# Patient Record
Sex: FEMALE | Race: WHITE | NOT HISPANIC OR LATINO | Employment: FULL TIME | URBAN - METROPOLITAN AREA
[De-identification: names, ages, dates, MRNs, and addresses within clinical notes are randomized per-mention and may not be internally consistent; named-entity substitution may affect disease eponyms.]

---

## 2023-04-04 LAB
ERYTHROCYTE DISTRIBUTION WIDTH (RATIO) BY AUTOMATED COUNT: 14 % (ref 11.5–14.5)
ERYTHROCYTE MEAN CORPUSCULAR HEMOGLOBIN CONCENTRATION (G/DL) BY AUTOMATED: 31.7 G/DL (ref 32–36)
ERYTHROCYTE MEAN CORPUSCULAR VOLUME (FL) BY AUTOMATED COUNT: 92 FL (ref 80–100)
ERYTHROCYTES (10*6/UL) IN BLOOD BY AUTOMATED COUNT: 4.76 X10E12/L (ref 4–5.2)
HEMATOCRIT (%) IN BLOOD BY AUTOMATED COUNT: 43.6 % (ref 36–46)
HEMOGLOBIN (G/DL) IN BLOOD: 13.8 G/DL (ref 12–16)
LEUKOCYTES (10*3/UL) IN BLOOD BY AUTOMATED COUNT: 8.8 X10E9/L (ref 4.4–11.3)
PLATELETS (10*3/UL) IN BLOOD AUTOMATED COUNT: 287 X10E9/L (ref 150–450)

## 2023-04-05 ENCOUNTER — OFFICE VISIT (OUTPATIENT)
Dept: PRIMARY CARE | Facility: CLINIC | Age: 48
End: 2023-04-05
Payer: COMMERCIAL

## 2023-04-05 VITALS
WEIGHT: 293 LBS | SYSTOLIC BLOOD PRESSURE: 117 MMHG | DIASTOLIC BLOOD PRESSURE: 72 MMHG | HEART RATE: 71 BPM | BODY MASS INDEX: 48.82 KG/M2 | OXYGEN SATURATION: 95 % | HEIGHT: 65 IN | TEMPERATURE: 96.2 F | RESPIRATION RATE: 17 BRPM

## 2023-04-05 DIAGNOSIS — G89.29 CHRONIC PAIN OF RIGHT KNEE: ICD-10-CM

## 2023-04-05 DIAGNOSIS — I10 BENIGN ESSENTIAL HYPERTENSION: ICD-10-CM

## 2023-04-05 DIAGNOSIS — R73.03 PREDIABETES: ICD-10-CM

## 2023-04-05 DIAGNOSIS — M25.561 CHRONIC PAIN OF RIGHT KNEE: ICD-10-CM

## 2023-04-05 DIAGNOSIS — E66.01 MORBID OBESITY (MULTI): ICD-10-CM

## 2023-04-05 DIAGNOSIS — F51.01 PRIMARY INSOMNIA: ICD-10-CM

## 2023-04-05 DIAGNOSIS — E55.9 VITAMIN D DEFICIENCY: ICD-10-CM

## 2023-04-05 DIAGNOSIS — M17.10 ARTHRITIS OF KNEE: Primary | ICD-10-CM

## 2023-04-05 DIAGNOSIS — K21.9 CHRONIC GERD: ICD-10-CM

## 2023-04-05 PROBLEM — M17.11 PRIMARY OSTEOARTHRITIS OF RIGHT KNEE: Status: ACTIVE | Noted: 2023-04-05

## 2023-04-05 LAB
ALANINE AMINOTRANSFERASE (SGPT) (U/L) IN SER/PLAS: 11 U/L (ref 7–45)
ALBUMIN (G/DL) IN SER/PLAS: 3.9 G/DL (ref 3.4–5)
ALKALINE PHOSPHATASE (U/L) IN SER/PLAS: 51 U/L (ref 33–110)
ANION GAP IN SER/PLAS: 12 MMOL/L (ref 10–20)
ASPARTATE AMINOTRANSFERASE (SGOT) (U/L) IN SER/PLAS: 20 U/L (ref 9–39)
BILIRUBIN TOTAL (MG/DL) IN SER/PLAS: 0.8 MG/DL (ref 0–1.2)
CALCIDIOL (25 OH VITAMIN D3) (NG/ML) IN SER/PLAS: 46 NG/ML
CALCIUM (MG/DL) IN SER/PLAS: 9.7 MG/DL (ref 8.6–10.6)
CARBON DIOXIDE, TOTAL (MMOL/L) IN SER/PLAS: 26 MMOL/L (ref 21–32)
CHLORIDE (MMOL/L) IN SER/PLAS: 106 MMOL/L (ref 98–107)
CREATININE (MG/DL) IN SER/PLAS: 0.85 MG/DL (ref 0.5–1.05)
ESTIMATED AVERAGE GLUCOSE FOR HBA1C: 117 MG/DL
GFR FEMALE: 84 ML/MIN/1.73M2
GLUCOSE (MG/DL) IN SER/PLAS: 74 MG/DL (ref 74–99)
HEMOGLOBIN A1C/HEMOGLOBIN TOTAL IN BLOOD: 5.7 %
POC AMPHETAMINE: NEGATIVE NG/ML
POC BARBITURATES: NEGATIVE NG/ML
POC BENZODIAZEPINES: NEGATIVE NG/ML
POC BUPRENORPHINE URINE: NEGATIVE NG/ML
POC COCAINE: NEGATIVE NG/ML
POC MDMA (NG/ML) IN URINE: NEGATIVE NG/ML
POC METHADONE MANUALLY ENTERED: NEGATIVE NG/ML
POC METHAMPHETAMINE: NEGATIVE NG/ML
POC MORPHINE URINE: NORMAL NG/ML
POC OPIATES: NEGATIVE NG/ML
POC OXYCODONE: NEGATIVE NG/ML
POC PHENCYCLIDINE (PCP): NEGATIVE NG/ML
POC THC: NEGATIVE NG/ML
POC TICYCLIC ANTIDEPRESSANTS (TCA): NORMAL NG/ML
POTASSIUM (MMOL/L) IN SER/PLAS: 4.4 MMOL/L (ref 3.5–5.3)
PROTEIN TOTAL: 6.5 G/DL (ref 6.4–8.2)
SODIUM (MMOL/L) IN SER/PLAS: 140 MMOL/L (ref 136–145)
UREA NITROGEN (MG/DL) IN SER/PLAS: 30 MG/DL (ref 6–23)

## 2023-04-05 PROCEDURE — 99214 OFFICE O/P EST MOD 30 MIN: CPT | Performed by: FAMILY MEDICINE

## 2023-04-05 PROCEDURE — 3074F SYST BP LT 130 MM HG: CPT | Performed by: FAMILY MEDICINE

## 2023-04-05 PROCEDURE — 80305 DRUG TEST PRSMV DIR OPT OBS: CPT | Performed by: FAMILY MEDICINE

## 2023-04-05 PROCEDURE — 3078F DIAST BP <80 MM HG: CPT | Performed by: FAMILY MEDICINE

## 2023-04-05 PROCEDURE — 80373 DRUG SCREENING TRAMADOL: CPT

## 2023-04-05 RX ORDER — PANTOPRAZOLE SODIUM 40 MG/1
40 TABLET, DELAYED RELEASE ORAL DAILY
Qty: 90 TABLET | Refills: 3 | Status: SHIPPED | OUTPATIENT
Start: 2023-04-05 | End: 2024-05-21 | Stop reason: SDUPTHER

## 2023-04-05 RX ORDER — OLMESARTAN MEDOXOMIL AND HYDROCHLOROTHIAZIDE 40/25 40; 25 MG/1; MG/1
1 TABLET ORAL DAILY
Qty: 90 TABLET | Refills: 3 | Status: SHIPPED | OUTPATIENT
Start: 2023-04-05 | End: 2023-07-07 | Stop reason: ALTCHOICE

## 2023-04-05 RX ORDER — NAPROXEN SODIUM 220 MG/1
TABLET, FILM COATED ORAL
COMMUNITY

## 2023-04-05 RX ORDER — ACETAMINOPHEN 500 MG
50 TABLET ORAL DAILY
COMMUNITY
Start: 2022-03-11

## 2023-04-05 RX ORDER — TOPIRAMATE 50 MG/1
50 TABLET, FILM COATED ORAL 2 TIMES DAILY
Qty: 180 TABLET | Refills: 1 | Status: SHIPPED | OUTPATIENT
Start: 2023-04-05 | End: 2023-10-20 | Stop reason: SDUPTHER

## 2023-04-05 RX ORDER — VERAPAMIL HYDROCHLORIDE 200 MG/1
200 CAPSULE, EXTENDED RELEASE ORAL NIGHTLY
Qty: 90 CAPSULE | Refills: 3 | Status: SHIPPED | OUTPATIENT
Start: 2023-04-05 | End: 2023-10-20 | Stop reason: DRUGHIGH

## 2023-04-05 RX ORDER — MELOXICAM 15 MG/1
1 TABLET ORAL DAILY PRN
COMMUNITY
Start: 2021-06-08 | End: 2023-04-05 | Stop reason: SDUPTHER

## 2023-04-05 RX ORDER — ORAL SEMAGLUTIDE 7 MG/1
1 TABLET ORAL DAILY
COMMUNITY
Start: 2023-03-14 | End: 2023-04-05 | Stop reason: DRUGHIGH

## 2023-04-05 RX ORDER — TRAMADOL HYDROCHLORIDE 50 MG/1
50 TABLET ORAL 3 TIMES DAILY
Qty: 270 TABLET | Refills: 0 | Status: SHIPPED | OUTPATIENT
Start: 2023-04-05 | End: 2023-07-07 | Stop reason: SDUPTHER

## 2023-04-05 RX ORDER — VERAPAMIL HYDROCHLORIDE 200 MG/1
200 CAPSULE, EXTENDED RELEASE ORAL NIGHTLY
COMMUNITY
Start: 2021-02-23 | End: 2023-04-05 | Stop reason: SDUPTHER

## 2023-04-05 RX ORDER — MELOXICAM 15 MG/1
15 TABLET ORAL DAILY PRN
Qty: 90 TABLET | Refills: 1 | Status: SHIPPED | OUTPATIENT
Start: 2023-04-05 | End: 2023-07-04

## 2023-04-05 RX ORDER — METOPROLOL TARTRATE 100 MG/1
1 TABLET ORAL EVERY 12 HOURS
COMMUNITY
Start: 2021-06-08 | End: 2023-04-05 | Stop reason: SDUPTHER

## 2023-04-05 RX ORDER — METOPROLOL TARTRATE 100 MG/1
100 TABLET ORAL EVERY 12 HOURS
Qty: 180 TABLET | Refills: 3 | Status: SHIPPED | OUTPATIENT
Start: 2023-04-05 | End: 2024-05-06

## 2023-04-05 RX ORDER — TRAMADOL HYDROCHLORIDE 50 MG/1
50 TABLET ORAL 3 TIMES DAILY PRN
COMMUNITY
Start: 2021-11-12 | End: 2023-04-05 | Stop reason: SDUPTHER

## 2023-04-05 RX ORDER — OLMESARTAN MEDOXOMIL AND HYDROCHLOROTHIAZIDE 40/25 40; 25 MG/1; MG/1
1 TABLET ORAL DAILY
COMMUNITY
Start: 2021-02-23 | End: 2023-04-05 | Stop reason: SDUPTHER

## 2023-04-05 RX ORDER — TOPIRAMATE 25 MG/1
1 TABLET ORAL DAILY
COMMUNITY
Start: 2022-10-27 | End: 2023-04-05 | Stop reason: DRUGHIGH

## 2023-04-05 RX ORDER — PANTOPRAZOLE SODIUM 40 MG/1
1 TABLET, DELAYED RELEASE ORAL DAILY
COMMUNITY
Start: 2021-02-23 | End: 2023-04-05 | Stop reason: SDUPTHER

## 2023-04-05 ASSESSMENT — ENCOUNTER SYMPTOMS
DIZZINESS: 0
POLYDIPSIA: 0
UNEXPECTED WEIGHT CHANGE: 0
HEADACHES: 1
DIARRHEA: 0
POLYPHAGIA: 0
PALPITATIONS: 0
DEPRESSION: 0
MYALGIAS: 0
FATIGUE: 0
LOSS OF SENSATION IN FEET: 0
TROUBLE SWALLOWING: 0
SHORTNESS OF BREATH: 0
OCCASIONAL FEELINGS OF UNSTEADINESS: 0
NAUSEA: 0
APPETITE CHANGE: 1

## 2023-04-05 ASSESSMENT — PATIENT HEALTH QUESTIONNAIRE - PHQ9
2. FEELING DOWN, DEPRESSED OR HOPELESS: NOT AT ALL
SUM OF ALL RESPONSES TO PHQ9 QUESTIONS 1 AND 2: 0
1. LITTLE INTEREST OR PLEASURE IN DOING THINGS: NOT AT ALL

## 2023-04-05 ASSESSMENT — COLUMBIA-SUICIDE SEVERITY RATING SCALE - C-SSRS
6. HAVE YOU EVER DONE ANYTHING, STARTED TO DO ANYTHING, OR PREPARED TO DO ANYTHING TO END YOUR LIFE?: NO
1. IN THE PAST MONTH, HAVE YOU WISHED YOU WERE DEAD OR WISHED YOU COULD GO TO SLEEP AND NOT WAKE UP?: NO
2. HAVE YOU ACTUALLY HAD ANY THOUGHTS OF KILLING YOURSELF?: NO

## 2023-04-05 NOTE — PATIENT INSTRUCTIONS
Increase Rybelsus to 14 mg daily. Follow up in 3 months with labs first. Call me in 30 days to let me know if you want me to write the 7 or the 14 for 90 days.     Continue Tramadol.     Labs in 3 months before visit.

## 2023-04-05 NOTE — PROGRESS NOTES
Subjective   Patient ID: Jacqueline Flores is a 48 y.o. female who presents for Follow-up (3 month follow up labs HTN, pain pre DM and  UDS/CSA).    Seen 3 months ago for Prediabetes, obesity, Vitamin D, knee arthritis with chronic pain which is disabling.     Prediabetes - on Rybelsus but unable to tolerate dose above 3 mg. Then gave it more time. Now doing well on Rybelsus 7 mg without side effects. Would like to go up to 14 mg daily. Drinking a lot of fluids. Having more headaches than usual.  On Diet and exercise. Wt 1/4 was 335 and Goal to have surgery on knee is 250. Had labs before visit. Weight 349 in November. She is feeling really good about her weight loss.     Vitamin D - on supplement. Repeat level done.     Knee arthritis with chronic pain - severe and limits lifestyle. She is able to do more now that on Tramadol 50 tid. Increased exercise. No side effects. Not using alcohol or other drugs. Due for CSA and repeat drug screen today. OARRS has been consistent with rx and no evidence of drug-seeking behavior. She has felt much better on it and understands is temporary pending surgery. Unable to take NSAIDs.     HTN - Benicar-hydrochlorothiazide and tolerating well. Had labs done before visit. No side effects.            Current Outpatient Medications:     aspirin 81 mg chewable tablet, , Disp: , Rfl:     cholecalciferol (Vitamin D-3) 50 mcg (2,000 unit) capsule, Take 1 capsule (50 mcg) by mouth early in the morning.. With food, Disp: , Rfl:     meloxicam (Mobic) 15 mg tablet, Take 1 tablet (15 mg) by mouth once daily as needed for moderate pain (4 - 6)., Disp: 90 tablet, Rfl: 1    metoprolol tartrate (Lopressor) 100 mg tablet, Take 1 tablet (100 mg) by mouth in the morning and 1 tablet (100 mg) in the evening., Disp: 180 tablet, Rfl: 3    olmesartan-hydrochlorothiazide (BENIcar HCT) 40-25 mg tablet, Take 1 tablet by mouth once daily., Disp: 90 tablet, Rfl: 3    pantoprazole (ProtoNix) 40 mg EC tablet, Take 1  "tablet (40 mg) by mouth once daily., Disp: 90 tablet, Rfl: 3    semaglutide (Rybelsus) 14 mg tablet tablet, Take 1 tablet (14 mg) by mouth once daily., Disp: 30 tablet, Rfl: 1    topiramate (Topamax) 50 mg tablet, Take 1 tablet (50 mg) by mouth in the morning and 1 tablet (50 mg) before bedtime., Disp: 180 tablet, Rfl: 1    traMADol (Ultram) 50 mg tablet, Take 1 tablet (50 mg) by mouth in the morning and 1 tablet (50 mg) in the evening and 1 tablet (50 mg) before bedtime., Disp: 270 tablet, Rfl: 0    verapamil ER (Veralan PM) 200 mg 24 hr capsule, Take 1 capsule (200 mg) by mouth once daily at bedtime., Disp: 90 capsule, Rfl: 3    Patient Active Problem List   Diagnosis    Arthritis of knee    Benign essential hypertension    Chronic GERD    Chronic pain of right knee    Morbid obesity (CMS/HCC)    Prediabetes    Vitamin D deficiency    Migraine    Primary insomnia         Review of Systems   Constitutional:  Positive for appetite change. Negative for fatigue and unexpected weight change.   HENT:  Negative for trouble swallowing.    Respiratory:  Negative for shortness of breath.    Cardiovascular:  Negative for chest pain, palpitations and leg swelling.   Gastrointestinal:  Negative for diarrhea and nausea.   Endocrine: Negative for cold intolerance, heat intolerance, polydipsia, polyphagia and polyuria.   Musculoskeletal:  Negative for myalgias.   Skin:  Negative for rash.   Neurological:  Positive for headaches. Negative for dizziness.       Objective   /72 (BP Location: Right arm, Patient Position: Sitting, BP Cuff Size: Large adult)   Pulse 71   Temp 35.7 °C (96.2 °F)   Resp 17   Ht 1.651 m (5' 5\")   Wt (!) 145 kg (319 lb)   SpO2 95%   BMI 53.08 kg/m²     Physical Exam  Vitals reviewed.   Constitutional:       Appearance: Normal appearance.   Pulmonary:      Effort: Pulmonary effort is normal.   Skin:     General: Skin is warm and dry.   Neurological:      Mental Status: She is alert. "   Psychiatric:         Mood and Affect: Mood normal.         Behavior: Behavior normal.         Recent Results (from the past 1008 hour(s))   CBC    Collection Time: 04/04/23 11:56 AM   Result Value Ref Range    WBC 8.8 4.4 - 11.3 x10E9/L    RBC 4.76 4.00 - 5.20 x10E12/L    Hemoglobin 13.8 12.0 - 16.0 g/dL    Hematocrit 43.6 36.0 - 46.0 %    MCV 92 80 - 100 fL    MCHC 31.7 (L) 32.0 - 36.0 g/dL    Platelets 287 150 - 450 x10E9/L    RDW 14.0 11.5 - 14.5 %   Comprehensive Metabolic Panel    Collection Time: 04/04/23 11:56 AM   Result Value Ref Range    Glucose 74 74 - 99 mg/dL    Sodium 140 136 - 145 mmol/L    Potassium 4.4 3.5 - 5.3 mmol/L    Chloride 106 98 - 107 mmol/L    Bicarbonate 26 21 - 32 mmol/L    Anion Gap 12 10 - 20 mmol/L    Urea Nitrogen 30 (H) 6 - 23 mg/dL    Creatinine 0.85 0.50 - 1.05 mg/dL    GFR Female 84 >90 mL/min/1.73m2    Calcium 9.7 8.6 - 10.6 mg/dL    Albumin 3.9 3.4 - 5.0 g/dL    Alkaline Phosphatase 51 33 - 110 U/L    Total Protein 6.5 6.4 - 8.2 g/dL    AST 20 9 - 39 U/L    Total Bilirubin 0.8 0.0 - 1.2 mg/dL    ALT (SGPT) 11 7 - 45 U/L   Vitamin D, Total    Collection Time: 04/04/23 11:56 AM   Result Value Ref Range    Vitamin D, 25-Hydroxy 46 ng/mL   Hemoglobin A1C    Collection Time: 04/04/23 11:56 AM   Result Value Ref Range    Hemoglobin A1C 5.7 (A) %    Estimated Average Glucose 117 MG/DL   POCT waived urine drug screen manually resulted    Collection Time: 04/05/23  3:42 PM   Result Value Ref Range    POC THC Negative Negative, Not applicable ng/mL    POC Cocaine Negative Negative, Not applicable ng/mL    POC Opiates Negative Negative, Not applicable ng/mL    POC Amphetamine Negative Negative, Not applicable ng/mL    POC Phencyclidine (PCP) Negative Negative, Not applicable ng/mL    POC Barbiturates Negative Negative, Not applicable ng/mL    POC Benzodiazepines Negative Negative, Not applicable ng/mL    POC Methamphetamine Negative Negative, Not applicable ng/mL    POC METHADONE  MANUALLY ENTERED Negative Negative, Not applicable ng/mL    POC Ticyclic Antidepressants (TCA) Not applicable Negative, Not applicable ng/mL    POC Oxycodone Negative Negative, Not applicable ng/mL    POC MDMA URINE Negative Negative, Not applicable ng/mL    POC Morphine Urine Not applicable Negative, Not applicable ng/mL    POC Burprenorphine Urine Negative Negative, Not applicable ng/mL           Assessment/Plan   Problem List Items Addressed This Visit          Nervous    Primary insomnia    Relevant Medications    topiramate (Topamax) 50 mg tablet       Circulatory    Benign essential hypertension     Labs reassuring. Tolerating well and BP at goal. Recheck in 6 months with labs prior to visit.          Relevant Medications    metoprolol tartrate (Lopressor) 100 mg tablet    olmesartan-hydrochlorothiazide (BENIcar HCT) 40-25 mg tablet    verapamil ER (Veralan PM) 200 mg 24 hr capsule       Digestive    Chronic GERD    Relevant Medications    pantoprazole (ProtoNix) 40 mg EC tablet       Musculoskeletal    Arthritis of knee - Primary     Unable to take NSAIDs and cannot have surgery until loses weight. Unable to increase activity due to poor pain control. Was started on Tramadol to help until loses enough weight to get surgery. [facility]SA and [facility].   Pain improved on her Tramadol 50 mg enough to function but not lasting long enough. Increased Tramadol to tid as did not last through workday. Tking extra tylenol and able to increase activity some. Goal weight for surgery is 250         Relevant Medications    meloxicam (Mobic) 15 mg tablet    traMADol (Ultram) 50 mg tablet    Other Relevant Orders    Tramadol Confirmation, Urine    POCT waived urine drug screen manually resulted (Completed)    OOB Internal Tracking    Chronic pain of right knee     eeing ortho now. THey told her cannot have surgery until BMI is down.  She failed Voltaren gel 1 gm 4 times a day and unable to take oral  NSAIDs. On Tramadol 50 mg tid for pain control pending weight loss and surgery.          Relevant Medications    meloxicam (Mobic) 15 mg tablet    traMADol (Ultram) 50 mg tablet    Other Relevant Orders    Tramadol Confirmation, Urine    POCT waived urine drug screen manually resulted (Completed)    OOB Internal Tracking       Endocrine/Metabolic    Morbid obesity (CMS/HCC)     Is down 30 lbs. She is feeling much better and functioning better. Ultimately, needs to be less than 250 to get her surgery. She is doing great job of steady weight loss. Given hear headaches and rapidity of weight loss, concerned she may be getting too few calories and proteing. Encouraged to use tracking lisa and [employer]as proteins and fluids. Recheck next visit in 3  months. She is on Rybelsus and increasing dose to 14 mg  if tolerates it.          Relevant Medications    semaglutide (Rybelsus) 14 mg tablet tablet    Prediabetes     Increased Rybelsus to 7 mg. Did not tolerate and decreased to 3 mg again. She slowly increased back to 7 mg and doing fine on it. Wanting to go up to 14 mg hebert. Tolerating well.            Relevant Medications    semaglutide (Rybelsus) 14 mg tablet tablet    Other Relevant Orders    Hemoglobin A1C    Lipid Panel    Vitamin D deficiency     Level finally into therapeutic range. Continue for now and repeat labs in 6-12 months.               Assessment, plans, tests, and follow up discussed with patient and patient verbalized understanding. Jacqueline was given an opportunity to ask questions and  any concerns were addressed including but not limited to med changes, follow up.

## 2023-04-05 NOTE — ASSESSMENT & PLAN NOTE
>>ASSESSMENT AND PLAN FOR BENIGN ESSENTIAL HYPERTENSION WRITTEN ON 4/5/2023  1:19 PM BY YANG BRISCOE MD    Labs reassuring. Tolerating well and BP at goal. Recheck in 6 months with labs prior to visit.

## 2023-04-05 NOTE — ASSESSMENT & PLAN NOTE
Unable to take NSAIDs and cannot have surgery until loses weight. Unable to increase activity due to poor pain control. Was started on Tramadol to help until loses enough weight to get surgery. CSA and UDS initially done in [facility] 11/21/22.   Pain improved on her Tramadol 50 mg enough to function but not lasting long enough. Increased Tramadol to tid as did not last through workday. Tking extra tylenol and able to increase activity some. Goal weight for surgery is 250

## 2023-04-06 NOTE — ASSESSMENT & PLAN NOTE
Increased Rybelsus to 7 mg. Did not tolerate and decreased to 3 mg again. She slowly increased back to 7 mg and doing fine on it. Wanting to go up to 14 mg hebert. Tolerating well.

## 2023-04-06 NOTE — ASSESSMENT & PLAN NOTE
Is down 30 lbs. She is feeling much better and functioning better. Ultimately, needs to be less than 250 to get her surgery. She is doing great job of steady weight loss. Given hear headaches and rapidity of weight loss, concerned she may be getting too few calories and proteing. Encouraged to use tracking lisa and increas proteins and fluids. Recheck next visit in 3  months. She is on Rybelsus and increasing dose to 14 mg  if tolerates it.

## 2023-04-11 LAB
O-DESMETHYLTRAMADOL: >1000 NG/ML
TRAMADOL: >1000 NG/ML

## 2023-07-05 ENCOUNTER — LAB (OUTPATIENT)
Dept: LAB | Facility: LAB | Age: 48
End: 2023-07-05
Payer: COMMERCIAL

## 2023-07-05 DIAGNOSIS — R73.03 PREDIABETES: ICD-10-CM

## 2023-07-05 LAB
CHOLESTEROL (MG/DL) IN SER/PLAS: 178 MG/DL (ref 0–199)
CHOLESTEROL IN HDL (MG/DL) IN SER/PLAS: 48.6 MG/DL
CHOLESTEROL/HDL RATIO: 3.7
ESTIMATED AVERAGE GLUCOSE FOR HBA1C: 117 MG/DL
HEMOGLOBIN A1C/HEMOGLOBIN TOTAL IN BLOOD: 5.7 %
LDL: 103 MG/DL (ref 0–99)
TRIGLYCERIDE (MG/DL) IN SER/PLAS: 131 MG/DL (ref 0–149)
VLDL: 26 MG/DL (ref 0–40)

## 2023-07-05 PROCEDURE — 83036 HEMOGLOBIN GLYCOSYLATED A1C: CPT

## 2023-07-05 PROCEDURE — 36415 COLL VENOUS BLD VENIPUNCTURE: CPT

## 2023-07-05 PROCEDURE — 80061 LIPID PANEL: CPT

## 2023-07-07 ENCOUNTER — OFFICE VISIT (OUTPATIENT)
Dept: PRIMARY CARE | Facility: CLINIC | Age: 48
End: 2023-07-07
Payer: COMMERCIAL

## 2023-07-07 VITALS
WEIGHT: 293 LBS | BODY MASS INDEX: 48.82 KG/M2 | OXYGEN SATURATION: 97 % | TEMPERATURE: 97 F | HEIGHT: 65 IN | DIASTOLIC BLOOD PRESSURE: 83 MMHG | SYSTOLIC BLOOD PRESSURE: 117 MMHG | HEART RATE: 70 BPM

## 2023-07-07 DIAGNOSIS — M17.10 ARTHRITIS OF KNEE: ICD-10-CM

## 2023-07-07 DIAGNOSIS — I10 BENIGN ESSENTIAL HYPERTENSION: Primary | ICD-10-CM

## 2023-07-07 DIAGNOSIS — G89.29 CHRONIC PAIN OF RIGHT KNEE: ICD-10-CM

## 2023-07-07 DIAGNOSIS — M25.561 CHRONIC PAIN OF RIGHT KNEE: ICD-10-CM

## 2023-07-07 DIAGNOSIS — R73.03 PREDIABETES: ICD-10-CM

## 2023-07-07 PROCEDURE — 99214 OFFICE O/P EST MOD 30 MIN: CPT | Performed by: FAMILY MEDICINE

## 2023-07-07 PROCEDURE — 3079F DIAST BP 80-89 MM HG: CPT | Performed by: FAMILY MEDICINE

## 2023-07-07 PROCEDURE — 3074F SYST BP LT 130 MM HG: CPT | Performed by: FAMILY MEDICINE

## 2023-07-07 RX ORDER — TRAMADOL HYDROCHLORIDE 50 MG/1
50 TABLET ORAL 3 TIMES DAILY
Qty: 270 TABLET | Refills: 0 | Status: SHIPPED | OUTPATIENT
Start: 2023-07-07 | End: 2023-10-09

## 2023-07-07 ASSESSMENT — ENCOUNTER SYMPTOMS
UNEXPECTED WEIGHT CHANGE: 0
HEADACHES: 0
PALPITATIONS: 0
SHORTNESS OF BREATH: 0
COUGH: 0
CONFUSION: 0

## 2023-07-07 NOTE — ASSESSMENT & PLAN NOTE
Unable to take NSAIDs and cannot have surgery until loses weight. Unable to increase activity due to poor pain control. Was started on Tramadol to help until loses enough weight to get surgery. CSA and UDS initially done 4/5/2023  Pain improved on her Tramadol 50 mg  tid enough to function  Taking extra tylenol and able to increase activity some. Goal weight for surgery is 250. OARRS reviewed today and no concerns.

## 2023-07-07 NOTE — ASSESSMENT & PLAN NOTE
>>ASSESSMENT AND PLAN FOR BENIGN ESSENTIAL HYPERTENSION WRITTEN ON 7/7/2023 12:10 PM BY YANG BRISCOE MD    On Metoprolol 100 mg bid, also Verapamil for migraine prophylaxis helping and Benicar 40-25. BP has decreased to systolic less than 100 all the time at home. Hold Benicar. If BP goes up, will add lower dose back. Check Bps at home and send message if BP out of range.

## 2023-07-07 NOTE — ASSESSMENT & PLAN NOTE
On Metoprolol 100 mg bid, also Verapamil for migraine prophylaxis helping and Benicar 40-25. BP has decreased to systolic less than 100 all the time at home. Hold Benicar. If BP goes up, will add lower dose back. Check Bps at home and send message if BP out of range.

## 2023-07-07 NOTE — ASSESSMENT & PLAN NOTE
seeing ortho now. THey told her cannot have surgery until BMI is down.  She failed Voltaren gel 1 gm 4 times a day and unable to take oral NSAIDs. On Tramadol 50 mg tid for pain control pending weight loss and surgery. She is doing well with weight loss, down 47 lbs so far. OARRS consistent. UDS and CSA 4/5. Refilled for 3 months. Followup in 3 months.

## 2023-07-07 NOTE — PATIENT INSTRUCTIONS
Stop Olmesartan-hydrochlorothiazide 40-25. Check Bps at home and if consistently over 130, then will add back in at lower dose so message me on MyChart or call if that is needed.   If blood pressures are still less than 115 consistently, will need to cut back and additional medication.     Use your Rybelsus 14 mg for next week. If not tolerating it, go back to Rybelsus 7 mg daily. IF tolerating it, continue. Let me know which to refill.     On Tramadol for knee pain. Needs replacment but weight has to be down to 250. She is working on weight loss at this time. Tramadol doing ok. Taking 3 times a day. Keeps her able to function and work. No side effects. Using voltaren gel as well. She had UDS and CSA done on 4/5/2023.     Schedule Pap/

## 2023-07-07 NOTE — PROGRESS NOTES
Subjective   Patient ID: Jacqueline Flores is a 48 y.o. female who presents for Follow-up (3 month follow up labs,  pain andHTN. Discuss low BP reading at home and right leg lump on vein. ).    Last seen 4.5 and here for follow up .     She has lost weight and Bps have been running low at home. They are down to /72-76 at home. She is on Metoprolol 100 bid, Benicare 40-25, and Verapamil but Verapamil is for migraine prevention.     Prediabetes - on Rybelsus. Here to follow up. Increased to 14 mg last visit. Has lost 17 lbs more. Tried 14 mg and did not like it. Back down to 7 mg. She is thinking of trying. The 14 again and has it at home. Will let me know which med she wants refilled next week. Originally started at 349 in November. Happy with her weight loss.                  Current Outpatient Medications:     aspirin 81 mg chewable tablet, , Disp: , Rfl:     cholecalciferol (Vitamin D-3) 50 mcg (2,000 unit) capsule, Take 1 capsule (50 mcg) by mouth early in the morning.. With food, Disp: , Rfl:     metoprolol tartrate (Lopressor) 100 mg tablet, Take 1 tablet (100 mg) by mouth in the morning and 1 tablet (100 mg) in the evening., Disp: 180 tablet, Rfl: 3    pantoprazole (ProtoNix) 40 mg EC tablet, Take 1 tablet (40 mg) by mouth once daily., Disp: 90 tablet, Rfl: 3    topiramate (Topamax) 50 mg tablet, Take 1 tablet (50 mg) by mouth in the morning and 1 tablet (50 mg) before bedtime., Disp: 180 tablet, Rfl: 1    verapamil ER (Veralan PM) 200 mg 24 hr capsule, Take 1 capsule (200 mg) by mouth once daily at bedtime., Disp: 90 capsule, Rfl: 3    semaglutide (Rybelsus) 14 mg tablet tablet, Take 1 tablet (14 mg) by mouth once daily., Disp: 30 tablet, Rfl: 1    traMADol (Ultram) 50 mg tablet, Take 1 tablet (50 mg) by mouth 3 times a day., Disp: 270 tablet, Rfl: 0    Patient Active Problem List   Diagnosis    Arthritis of knee    Benign essential hypertension    Chronic GERD    Chronic pain of right knee    Morbid  "obesity (CMS/[facility])    Prediabetes    Vitamin D deficiency    Migraine    Primary insomnia         Review of Systems   Constitutional:  Negative for unexpected weight change.   Respiratory:  Negative for cough and shortness of breath.    Cardiovascular:  Negative for chest pain, palpitations and leg swelling.   Skin:  Negative for rash.   Neurological:  Negative for headaches.   Psychiatric/Behavioral:  Negative for confusion.        Objective   /83 (BP Location: Left arm, Patient Position: Sitting)   Pulse 70   Temp 36.1 °C (97 °F)   Ht 1.651 m (5' 5\")   Wt 137 kg (302 lb 6.4 oz)   SpO2 97%   BMI 50.32 kg/m²     Physical Exam  Vitals reviewed.   Constitutional:       Appearance: Normal appearance.   Pulmonary:      Effort: Pulmonary effort is normal.   Neurological:      Mental Status: She is alert.   Psychiatric:         Mood and Affect: Mood normal.         Behavior: Behavior normal.       Recent Results (from the past 672 hour(s))   Hemoglobin A1C    Collection Time: 07/05/23 10:16 AM   Result Value Ref Range    Hemoglobin A1C 5.7 (A) %    Estimated Average Glucose 117 MG/DL   Lipid Panel    Collection Time: 07/05/23 10:16 AM   Result Value Ref Range    Cholesterol 178 0 - 199 mg/dL    HDL 48.6 mg/dL    Cholesterol/HDL Ratio 3.7      (H) 0 - 99 mg/dL    VLDL 26 0 - 40 mg/dL    Triglycerides 131 0 - 149 mg/dL       Assessment/Plan   Problem List Items Addressed This Visit       Arthritis of knee     Unable to take NSAIDs and cannot have surgery until loses weight. Unable to increase activity due to poor pain control. Was started on Tramadol to help until loses enough weight to get surgery. CSA and UDS initially done 4/5/2023  Pain improved on her Tramadol 50 mg  tid enough to function  Taking extra tylenol and able to increase activity some. Goal weight for surgery is 250. OARRS reviewed today and no concerns.          Relevant Medications    traMADol (Ultram) 50 mg tablet    Benign essential " hypertension - Primary     On Metoprolol 100 mg bid, also Verapamil for migraine prophylaxis helping and Benicar 40-25. BP has decreased to systolic less than 100 all the time at home. Hold Benicar. If BP goes up, will add lower dose back. Check Bps at home and send message if BP out of range.          Chronic pain of right knee     seeing ortho now. THey told her cannot have surgery until BMI is down.  She failed Voltaren gel 1 gm 4 times a day and unable to take oral NSAIDs. On Tramadol 50 mg tid for pain control pending weight loss and surgery. She is doing well with weight loss, down 47 lbs so far. OARRS consistent. UDS and CSA 4/5. Refilled for 3 months. Followup in 3 months.          Relevant Medications    traMADol (Ultram) 50 mg tablet    Prediabetes     A1C is 5.7 currently. Continue Rybelsus, weight loss, and follow up in 6 months.               Assessment, plans, tests, and follow up discussed with patient and patient verbalized understanding. Jacqueline was given an opportunity to ask questions and  any concerns were addressed including but not limited to medications, lab results and follow up. .

## 2023-08-07 ENCOUNTER — PATIENT MESSAGE (OUTPATIENT)
Dept: PRIMARY CARE | Facility: CLINIC | Age: 48
End: 2023-08-07
Payer: COMMERCIAL

## 2023-08-07 DIAGNOSIS — R73.03 PREDIABETES: ICD-10-CM

## 2023-08-07 DIAGNOSIS — E66.01 MORBID OBESITY (MULTI): ICD-10-CM

## 2023-08-28 LAB
CLUE CELLS: NORMAL
NUGENT SCORE: 0
YEAST: NORMAL

## 2023-10-06 ENCOUNTER — APPOINTMENT (OUTPATIENT)
Dept: PRIMARY CARE | Facility: CLINIC | Age: 48
End: 2023-10-06
Payer: COMMERCIAL

## 2023-10-09 ENCOUNTER — TELEPHONE (OUTPATIENT)
Dept: PRIMARY CARE | Facility: CLINIC | Age: 48
End: 2023-10-09
Payer: COMMERCIAL

## 2023-10-09 ENCOUNTER — HOSPITAL ENCOUNTER (EMERGENCY)
Facility: HOSPITAL | Age: 48
Discharge: HOME | End: 2023-10-09
Attending: EMERGENCY MEDICINE | Admitting: EMERGENCY MEDICINE
Payer: COMMERCIAL

## 2023-10-09 ENCOUNTER — PATIENT MESSAGE (OUTPATIENT)
Dept: PRIMARY CARE | Facility: CLINIC | Age: 48
End: 2023-10-09
Payer: COMMERCIAL

## 2023-10-09 VITALS
SYSTOLIC BLOOD PRESSURE: 150 MMHG | RESPIRATION RATE: 16 BRPM | HEART RATE: 75 BPM | BODY MASS INDEX: 48.48 KG/M2 | WEIGHT: 291.01 LBS | DIASTOLIC BLOOD PRESSURE: 80 MMHG | HEIGHT: 65 IN | OXYGEN SATURATION: 97 % | TEMPERATURE: 97.5 F

## 2023-10-09 DIAGNOSIS — I10 BENIGN ESSENTIAL HYPERTENSION: Primary | ICD-10-CM

## 2023-10-09 DIAGNOSIS — H10.31 ACUTE CONJUNCTIVITIS OF RIGHT EYE, UNSPECIFIED ACUTE CONJUNCTIVITIS TYPE: ICD-10-CM

## 2023-10-09 DIAGNOSIS — I10 HYPERTENSION, UNSPECIFIED TYPE: Primary | ICD-10-CM

## 2023-10-09 LAB
ANION GAP SERPL CALC-SCNC: 11 MMOL/L (ref 10–20)
BASOPHILS # BLD AUTO: 0.05 X10*3/UL (ref 0–0.1)
BASOPHILS NFR BLD AUTO: 0.4 %
BUN SERPL-MCNC: 21 MG/DL (ref 6–23)
CALCIUM SERPL-MCNC: 9.2 MG/DL (ref 8.6–10.3)
CHLORIDE SERPL-SCNC: 107 MMOL/L (ref 98–107)
CO2 SERPL-SCNC: 25 MMOL/L (ref 21–32)
CREAT SERPL-MCNC: 0.82 MG/DL (ref 0.5–1.05)
EOSINOPHIL # BLD AUTO: 0.24 X10*3/UL (ref 0–0.7)
EOSINOPHIL NFR BLD AUTO: 1.7 %
ERYTHROCYTE [DISTWIDTH] IN BLOOD BY AUTOMATED COUNT: 14 % (ref 11.5–14.5)
GFR SERPL CREATININE-BSD FRML MDRD: 88 ML/MIN/1.73M*2
GLUCOSE SERPL-MCNC: 91 MG/DL (ref 74–99)
HCT VFR BLD AUTO: 46.6 % (ref 36–46)
HGB BLD-MCNC: 15.3 G/DL (ref 12–16)
IMM GRANULOCYTES # BLD AUTO: 0.06 X10*3/UL (ref 0–0.7)
IMM GRANULOCYTES NFR BLD AUTO: 0.4 % (ref 0–0.9)
LYMPHOCYTES # BLD AUTO: 2.81 X10*3/UL (ref 1.2–4.8)
LYMPHOCYTES NFR BLD AUTO: 19.9 %
MCH RBC QN AUTO: 30.3 PG (ref 26–34)
MCHC RBC AUTO-ENTMCNC: 32.8 G/DL (ref 32–36)
MCV RBC AUTO: 92 FL (ref 80–100)
MONOCYTES # BLD AUTO: 0.95 X10*3/UL (ref 0.1–1)
MONOCYTES NFR BLD AUTO: 6.7 %
NEUTROPHILS # BLD AUTO: 10.02 X10*3/UL (ref 1.2–7.7)
NEUTROPHILS NFR BLD AUTO: 70.9 %
NRBC BLD-RTO: 0 /100 WBCS (ref 0–0)
PLATELET # BLD AUTO: 288 X10*3/UL (ref 150–450)
PMV BLD AUTO: 10.9 FL (ref 7.5–11.5)
POTASSIUM SERPL-SCNC: 3.9 MMOL/L (ref 3.5–5.3)
RBC # BLD AUTO: 5.05 X10*6/UL (ref 4–5.2)
SODIUM SERPL-SCNC: 139 MMOL/L (ref 136–145)
WBC # BLD AUTO: 14.1 X10*3/UL (ref 4.4–11.3)

## 2023-10-09 PROCEDURE — 99284 EMERGENCY DEPT VISIT MOD MDM: CPT | Performed by: EMERGENCY MEDICINE

## 2023-10-09 PROCEDURE — 36415 COLL VENOUS BLD VENIPUNCTURE: CPT | Performed by: EMERGENCY MEDICINE

## 2023-10-09 PROCEDURE — 80048 BASIC METABOLIC PNL TOTAL CA: CPT | Performed by: EMERGENCY MEDICINE

## 2023-10-09 PROCEDURE — 85025 COMPLETE CBC W/AUTO DIFF WBC: CPT | Performed by: EMERGENCY MEDICINE

## 2023-10-09 PROCEDURE — 99283 EMERGENCY DEPT VISIT LOW MDM: CPT

## 2023-10-09 RX ORDER — OLMESARTAN MEDOXOMIL AND HYDROCHLOROTHIAZIDE 20/12.5 20; 12.5 MG/1; MG/1
1 TABLET ORAL DAILY
Qty: 30 TABLET | Refills: 1 | Status: SHIPPED | OUTPATIENT
Start: 2023-10-09 | End: 2023-10-20 | Stop reason: SDUPTHER

## 2023-10-09 RX ORDER — MELOXICAM 15 MG/1
15 TABLET ORAL DAILY
COMMUNITY
End: 2023-10-20 | Stop reason: SDUPTHER

## 2023-10-09 RX ORDER — BACITRACIN ZINC AND POLYMYXIN B SULFATE 500; 10000 [USP'U]/G; [USP'U]/G
1 OINTMENT OPHTHALMIC EVERY 12 HOURS
Qty: 7 G | Refills: 0 | Status: SHIPPED | OUTPATIENT
Start: 2023-10-09 | End: 2023-10-20 | Stop reason: ALTCHOICE

## 2023-10-09 ASSESSMENT — PAIN DESCRIPTION - LOCATION: LOCATION: HEAD

## 2023-10-09 ASSESSMENT — LIFESTYLE VARIABLES
EVER HAD A DRINK FIRST THING IN THE MORNING TO STEADY YOUR NERVES TO GET RID OF A HANGOVER: NO
HAVE PEOPLE ANNOYED YOU BY CRITICIZING YOUR DRINKING: NO
HAVE YOU EVER FELT YOU SHOULD CUT DOWN ON YOUR DRINKING: NO
EVER FELT BAD OR GUILTY ABOUT YOUR DRINKING: NO

## 2023-10-09 ASSESSMENT — PAIN DESCRIPTION - ORIENTATION: ORIENTATION: ANTERIOR

## 2023-10-09 ASSESSMENT — PAIN DESCRIPTION - DESCRIPTORS: DESCRIPTORS: ACHING;THROBBING

## 2023-10-09 ASSESSMENT — PAIN SCALES - GENERAL
PAINLEVEL_OUTOF10: 0 - NO PAIN
PAINLEVEL_OUTOF10: 8
PAINLEVEL_OUTOF10: 0 - NO PAIN

## 2023-10-09 ASSESSMENT — COLUMBIA-SUICIDE SEVERITY RATING SCALE - C-SSRS
1. IN THE PAST MONTH, HAVE YOU WISHED YOU WERE DEAD OR WISHED YOU COULD GO TO SLEEP AND NOT WAKE UP?: NO
6. HAVE YOU EVER DONE ANYTHING, STARTED TO DO ANYTHING, OR PREPARED TO DO ANYTHING TO END YOUR LIFE?: NO
2. HAVE YOU ACTUALLY HAD ANY THOUGHTS OF KILLING YOURSELF?: NO

## 2023-10-09 ASSESSMENT — PAIN - FUNCTIONAL ASSESSMENT: PAIN_FUNCTIONAL_ASSESSMENT: 0-10

## 2023-10-09 ASSESSMENT — PAIN DESCRIPTION - PAIN TYPE: TYPE: ACUTE PAIN

## 2023-10-09 NOTE — ED PROVIDER NOTES
HPI   Chief Complaint   Patient presents with   • Hypertension     Took BP at home due to ongoing headache. Found to be 180/130 at home. Denies CP and SOB. Has not missed any home doses of BP meds.        Patient presents with high blood pressure.  She took it at home and it was 180/130.  She took her blood pressure because she has been having a headache for the past couple of days.  The bandlike sensation around her head that feels different than her previous migraine headaches.  She tried Tylenol and ibuprofen without any relief.  She does have a history of hypertension.  A few months ago she was taken off of one of her medications due to low readings at home.  She denies any aura.  No nausea or vomiting.  She denies any neck pain.  No chest pain, shortness of breath.  No numbness or tingling in her arms or legs.  No lateralizing weakness.  No slurred speech.  She also has some right eye redness.  She has had some crusting around the eye as well.                            No data recorded                Patient History   Past Medical History:   Diagnosis Date   • Arthritis of knee 04/05/2023    Unable to take NSAIDs and cannot have surgery until loses weight. Unable to increase activity due to poor pain control. Was started on Tramadol to help until loses enough weight to get surgery. CSA and UDS initially done in [facility] 11/21/22.   Pain improved on her Tramadol 50 mg enough to function but not lasting long enough. Increased Tramadol to tid as did not last through workday. Tking extr   • Benign essential hypertension 04/05/2023    Evelin Robbins  Sep 02, 2022 7:02PM  Stable on meds, BP a little high today. She will get labs and follow up in a month.     Emily King  Mar 11, 2022 2:35PM  Stable. Continue olmesartan-HCTZ, verapamil and metoprolol. Refills provided. Ordered routine blood work for 6 months.     Emily King  Nov 12, 2021 3:51PM  Stable. Continue olmesartan-HCTZ,  verapamil and met   • Chronic GERD 04/05/2023    Rosendo Evelin  Sep 02, 2022 7:02PM  Is on Protonix. Address at follow up next month. No current xs.     Emily King  Mar 11, 2022 2:35PM  Stable. Continue protonix.  Refill provided.     Emily King  Nov 12, 2021 3:51PM  Stable. Continue protonix.  Refill provided.     Emily King  Jun 08, 2021 4:19PM  Stable. Continue protonix.  Refill provided.   • Chronic pain of right knee 04/05/2023       seeing ortho now. THey told her cannot have surgery until BMI is down.  She failed Voltaren gel 1 gm 4 times a day and unable to take oral NSAIDs. On Tramadol 50 mg tid for pain control pending weight loss and surgery.    • History of stroke 06/09/2014   • Morbid obesity (CMS/HCC) 04/05/2023    Evelin Robbins  Nov 21, 2022 5:35PM  Will increase dose of Tramadol to help with pain walking. Increasing activity will help her lose weight to get to goal to get knee replaced. Inactivity is counterproductive for her health. She is tolerating well.   • Prediabetes 04/05/2023       Increased Rybelsus to 7 mg. Did not tolerate and decreased to 3 mg again. Last A1C 6.8 but others all less than 6.5.       • Primary osteoarthritis of right knee 04/05/2023    Kt Kingsey  Mar 11, 2022 2:35PM  Follows with orthopedics.  S/p steroid and gel injections this past summer. Continue meloxicam, tylenol and tramadol.     Emily King  Nov 12, 2021 4:13PM  Continues to have pain. Follows with orthopedics.  S/p steroid and gel injections this past summer. Continue meloxicam and tylenol. Provided 16 tabs of tramadol to use BID 2 days per   • Vitamin D deficiency 04/05/2023    Evelin Robbins  Sep 02, 2022 7:02PM  Overdue for labs. Follow up one month.     Christine Emily  Mar 11, 2022 2:35PM  Will start on daily vitamin D 2000 IU daily and recheck in 6 months.     Emily King  Nov 12, 2021 3:51PM   Continue weekly vitamin D as previously prescribed with repeat vitamin D after treatment.     Past Surgical History:   Procedure Laterality Date   • OTHER SURGICAL HISTORY  06/08/2021    Tonsillectomy with adenoidectomy     Family History   Problem Relation Name Age of Onset   • Breast cancer Mother     • Leukemia Mother     • Breast cancer Father     • Cerebral aneurysm Maternal Grandfather     • Esophageal cancer Paternal Grandmother     • Leukemia Other     • Brain cancer Other Aunt      Social History     Tobacco Use   • Smoking status: Never   • Smokeless tobacco: Not on file   Vaping Use   • Vaping Use: Never used   Substance Use Topics   • Alcohol use: Yes     Comment: weekends   • Drug use: Never       Physical Exam   ED Triage Vitals [10/09/23 0353]   Temp Heart Rate Resp BP   36.4 °C (97.5 °F) 90 16 (!) 180/141      SpO2 Temp Source Heart Rate Source Patient Position   99 % Oral Monitor --      BP Location FiO2 (%)     -- --       Physical Exam  Vitals and nursing note reviewed.   Constitutional:       Appearance: Normal appearance.   HENT:      Head: Normocephalic and atraumatic.      Mouth/Throat:      Mouth: Mucous membranes are moist.   Eyes:      General:         Right eye: Discharge (Conjunctival injection on the right.  Mild crusting noted.) present.      Extraocular Movements: Extraocular movements intact.      Pupils: Pupils are equal, round, and reactive to light.   Cardiovascular:      Rate and Rhythm: Normal rate and regular rhythm.      Heart sounds: No murmur heard.  Pulmonary:      Effort: Pulmonary effort is normal. No respiratory distress.      Breath sounds: Normal breath sounds.   Abdominal:      General: There is no distension.      Palpations: Abdomen is soft.      Tenderness: There is no abdominal tenderness.   Musculoskeletal:         General: No tenderness or deformity. Normal range of motion.      Cervical back: Neck supple.      Right lower leg: No edema.      Left lower leg: No  edema.   Skin:     General: Skin is warm and dry.      Findings: No lesion or rash.   Neurological:      General: No focal deficit present.      Mental Status: She is alert and oriented to person, place, and time.      Sensory: No sensory deficit.      Motor: No weakness.   Psychiatric:         Behavior: Behavior normal.       Labs Reviewed   CBC WITH AUTO DIFFERENTIAL   BASIC METABOLIC PANEL     No orders to display     ED Course & MDM   ED Course as of 10/09/23 0509   Mon Oct 09, 2023   0422 Repeat blood pressure now in the bed is 159 systolic.  Headache has subsided. [JL]      ED Course User Index  [JL] Ezio Calderon MD         Diagnoses as of 10/09/23 0509   Hypertension, unspecified type   Acute conjunctivitis of right eye, unspecified acute conjunctivitis type       Medical Decision Making  The patient an EKG that was sinus rhythm at a rate of 79.  No acute ischemic changes.  QTc 416, WI interval 147.  Laboratory studies show white count of 14.1.  Creatinine normal.  Sodium normal.  Blood pressure is 150/88 upon reevaluation at 5:05 AM.  Patient feels improved without headache.  I will give her bacitracin ophthalmic for the right conjunctivitis.  She is going to monitor her blood pressure at home and keep a log of her blood pressures.  She is seeing her doctor next week and they can decide whether that we start her Benicar home dose due to consistently elevated readings.  Patient understands the plan of care and will be discharged.        Procedure  Procedures     Ezio Calderon MD  10/09/23 6388

## 2023-10-09 NOTE — TELEPHONE ENCOUNTER
Sent in Benicar-hydrochlorothiazide 20-12.5 mg once a day. This is lower dose than on before.  She needs to pick it up at pharmacy and keep follow up next week.

## 2023-10-09 NOTE — TELEPHONE ENCOUNTER
Patient went to the ER this morning for high BP. She said upon going to the ER it was 182/130. When she left it was 150/78 and is currently 167/111. She is unsure how to go about this as her BP is still high. Please advise.

## 2023-10-16 ENCOUNTER — APPOINTMENT (OUTPATIENT)
Dept: PRIMARY CARE | Facility: CLINIC | Age: 48
End: 2023-10-16
Payer: COMMERCIAL

## 2023-10-20 ENCOUNTER — OFFICE VISIT (OUTPATIENT)
Dept: PRIMARY CARE | Facility: CLINIC | Age: 48
End: 2023-10-20
Payer: COMMERCIAL

## 2023-10-20 VITALS
HEIGHT: 65 IN | TEMPERATURE: 96.1 F | WEIGHT: 281 LBS | SYSTOLIC BLOOD PRESSURE: 120 MMHG | BODY MASS INDEX: 46.82 KG/M2 | DIASTOLIC BLOOD PRESSURE: 86 MMHG | HEART RATE: 80 BPM | OXYGEN SATURATION: 98 %

## 2023-10-20 DIAGNOSIS — E66.01 MORBID OBESITY (MULTI): ICD-10-CM

## 2023-10-20 DIAGNOSIS — I10 BENIGN ESSENTIAL HYPERTENSION: ICD-10-CM

## 2023-10-20 DIAGNOSIS — M17.10 ARTHRITIS OF KNEE: ICD-10-CM

## 2023-10-20 DIAGNOSIS — I10 ACCELERATED ESSENTIAL HYPERTENSION: ICD-10-CM

## 2023-10-20 DIAGNOSIS — F51.01 PRIMARY INSOMNIA: ICD-10-CM

## 2023-10-20 DIAGNOSIS — G43.109 MIGRAINE WITH AURA AND WITHOUT STATUS MIGRAINOSUS, NOT INTRACTABLE: ICD-10-CM

## 2023-10-20 DIAGNOSIS — Z00.00 WELL ADULT EXAM: Primary | ICD-10-CM

## 2023-10-20 DIAGNOSIS — E55.9 VITAMIN D DEFICIENCY: ICD-10-CM

## 2023-10-20 DIAGNOSIS — R73.03 PREDIABETES: ICD-10-CM

## 2023-10-20 DIAGNOSIS — K21.9 CHRONIC GERD: ICD-10-CM

## 2023-10-20 PROCEDURE — 99213 OFFICE O/P EST LOW 20 MIN: CPT | Performed by: FAMILY MEDICINE

## 2023-10-20 PROCEDURE — 3079F DIAST BP 80-89 MM HG: CPT | Performed by: FAMILY MEDICINE

## 2023-10-20 PROCEDURE — 3074F SYST BP LT 130 MM HG: CPT | Performed by: FAMILY MEDICINE

## 2023-10-20 PROCEDURE — 1036F TOBACCO NON-USER: CPT | Performed by: FAMILY MEDICINE

## 2023-10-20 PROCEDURE — 99396 PREV VISIT EST AGE 40-64: CPT | Performed by: FAMILY MEDICINE

## 2023-10-20 RX ORDER — MELOXICAM 15 MG/1
15 TABLET ORAL DAILY
Qty: 90 TABLET | Refills: 3 | Status: SHIPPED | OUTPATIENT
Start: 2023-10-20 | End: 2024-10-19

## 2023-10-20 RX ORDER — OLMESARTAN MEDOXOMIL AND HYDROCHLOROTHIAZIDE 20/12.5 20; 12.5 MG/1; MG/1
2 TABLET ORAL DAILY
Qty: 180 TABLET | Refills: 3 | Status: SHIPPED | OUTPATIENT
Start: 2023-10-20 | End: 2024-10-19

## 2023-10-20 RX ORDER — TOPIRAMATE 50 MG/1
50 TABLET, FILM COATED ORAL NIGHTLY
Qty: 90 TABLET | Refills: 3 | Status: SHIPPED | OUTPATIENT
Start: 2023-10-20 | End: 2024-10-19

## 2023-10-20 RX ORDER — VERAPAMIL HYDROCHLORIDE 240 MG/1
240 CAPSULE, EXTENDED RELEASE ORAL NIGHTLY
Qty: 30 CAPSULE | Refills: 1 | Status: SHIPPED | OUTPATIENT
Start: 2023-10-20 | End: 2023-11-16 | Stop reason: SDUPTHER

## 2023-10-20 ASSESSMENT — ENCOUNTER SYMPTOMS
CONFUSION: 0
COUGH: 0
APPETITE CHANGE: 0
FATIGUE: 0
DIFFICULTY URINATING: 0
POLYDIPSIA: 0
VOMITING: 0
DIARRHEA: 0
SEIZURES: 0
HEADACHES: 1
NAUSEA: 0
DYSPHORIC MOOD: 0
BLOOD IN STOOL: 0
TROUBLE SWALLOWING: 0
ACTIVITY CHANGE: 0
PALPITATIONS: 0
UNEXPECTED WEIGHT CHANGE: 0
JOINT SWELLING: 0
SHORTNESS OF BREATH: 0
POLYPHAGIA: 0
CONSTIPATION: 0
ARTHRALGIAS: 0
WHEEZING: 0
ABDOMINAL PAIN: 0
NERVOUS/ANXIOUS: 0

## 2023-10-20 NOTE — PATIENT INSTRUCTIONS
Increased Verapamil to 240 mg daily  Continue increased dose of Benicar - hydrochlorothiazide  Continue Metoprolol    IF headaches not improving, please let me know.     You have cardiology referral.     Have COVID shot once this issue is better.

## 2023-10-20 NOTE — PROGRESS NOTES
Subjective   Patient ID: Jacqueline Flores is a 48 y.o. female who presents for Annual Exam (3 month follow up labs, medication and Physical.  [facility] ER visit about 2 weeks for elevated BP. ).    Here to follow up on chronic issues. Last seen 3 months ago. Also for physical    Arthritis knee - on Meloxicam daily. Helps a lot. Labs done when at ER recently. No side effects.     Prediabetes - last A1C 5.7 in July. On Semagultide. Diet, exercise and weight loss encouraged. Did not tolerate Metformin. Is on Rybelsus 14. Tolearating well. Weight down to 281, has to be down to 250 to get knee surgery.     Morbid obesity. Last weight 291, BMI 48. Has been making strides in diet and exercise but still room to go. She is down to 283 now. Moving better with weight off.     Migraines - On topamax for prophylaxis. Tolerates well. CBC and BMP done. They are under control.     Vitamin D - on 2000 units daily. Due for level next visit    HTN - on Benicar-hydrochlorothiazide 20-12.5 and Metoprolol tartrate 100 mg bid (also to help with migraines. Tolerating well. Not checking Bps at home. Trying to limit sodium. Woke up with bad headache 2 weeks ago. BP was 188/132, was seen at ER and was 157 before discharge. At home since then, doubled dose of Olmesartan. Since then she has had am readings 140-150/ 114. No side effects. PM reading better. 130/95. No dizziness or lightheadedness. Still has a headache.     Here for annual wellness exam. Last wellness over year.     New concerns:Recent ER visit. As above.   Feels: fairly well    Changes  to health/medications since last visit No   Other providers seen since last visit ER, GYN for Pap  Periods: regular  Contraception: no    Healthy lifestyle habits: Regular exercise: Yes                                         Dietary habits: improving                                        Social connections/relationships good                                        Wears seatbelts Yes                                          Sunscreen Yes                                         Sexual Risk Factors No        Health screenings:  Pap smear: 8/25/2023 HPV negative repeat due 2028                                  Mammogram negative 4/1/2023                                  Self-breast Exam No                                   Colon screening Cologuard 11/18/22 negative, due 11/2025                                  Dexa not applicable                                  Hep C screening Yes                                   HIV screening no, declines                                  STI screeningnot applicable                          Health risks: Domestic Violence no                      Work-life balance Yes                       Smoke detectors Yes                       Carbon monoxide detectors yes                      Gun safety not applicable                      Second-hand Smoke No                       Occupational Risks no    Immunizations:  Influenza:  Done recently.                             Tdap:2/19/2016                            HPV: not applicable                           Pneumonia: not applicable                           Shingrix: not applicable                           COVID: Moderna times 3, Pfizer times one, Booster due               Current Outpatient Medications:     aspirin 81 mg chewable tablet, , Disp: , Rfl:     cholecalciferol (Vitamin D-3) 50 mcg (2,000 unit) capsule, Take 1 capsule (50 mcg) by mouth early in the morning.. With food, Disp: , Rfl:     metoprolol tartrate (Lopressor) 100 mg tablet, Take 1 tablet (100 mg) by mouth in the morning and 1 tablet (100 mg) in the evening., Disp: 180 tablet, Rfl: 3    pantoprazole (ProtoNix) 40 mg EC tablet, Take 1 tablet (40 mg) by mouth once daily., Disp: 90 tablet, Rfl: 3    meloxicam (Mobic) 15 mg tablet, Take 1 tablet (15 mg) by mouth once daily., Disp: 90 tablet, Rfl: 3    olmesartan-hydrochlorothiazide (Benicar HCT) 20-12.5 mg tablet, Take  2 tablets by mouth once daily., Disp: 180 tablet, Rfl: 3    semaglutide (Rybelsus) 14 mg tablet tablet, Take 1 tablet (14 mg) by mouth once daily., Disp: 90 tablet, Rfl: 3    topiramate (Topamax) 50 mg tablet, Take 1 tablet (50 mg) by mouth once daily at bedtime., Disp: 90 tablet, Rfl: 3    verapamil ER (Veralan PM) 240 mg 24 hr capsule, Take 1 capsule (240 mg) by mouth once daily at bedtime. Do not crush or chew., Disp: 30 capsule, Rfl: 1    Patient Active Problem List   Diagnosis    Arthritis of knee    Accelerated essential hypertension    Chronic GERD    Chronic pain of right knee    Morbid obesity (CMS/HCC)    Prediabetes    Vitamin D deficiency    Migraine    Primary insomnia         Review of Systems   Constitutional:  Negative for activity change, appetite change, fatigue and unexpected weight change.        Sweating more.    HENT:  Negative for dental problem, hearing loss and trouble swallowing.    Eyes:  Negative for visual disturbance.   Respiratory:  Negative for cough, shortness of breath and wheezing.         Gets a little more short of breath with lot of exertion but is more active. No chest pain.    Cardiovascular:  Negative for chest pain, palpitations and leg swelling.   Gastrointestinal:  Negative for abdominal pain, blood in stool, constipation, diarrhea, nausea and vomiting.   Endocrine: Positive for cold intolerance. Negative for heat intolerance, polydipsia, polyphagia and polyuria.   Genitourinary:  Negative for difficulty urinating and menstrual problem.   Musculoskeletal:  Negative for arthralgias and joint swelling.        Right knee pain. Awaiting replacement.    Skin:  Negative for rash.   Neurological:  Positive for headaches. Negative for seizures and syncope.   Psychiatric/Behavioral:  Negative for confusion and dysphoric mood. The patient is not nervous/anxious.        Objective   /86 (BP Location: Left arm, Patient Position: Sitting)   Pulse 80   Temp 35.6 °C (96.1 °F)    "Ht 1.651 m (5' 5\")   Wt 127 kg (281 lb)   SpO2 98%   BMI 46.76 kg/m²     Physical Exam  Constitutional:       Appearance: Normal appearance. She is obese.   HENT:      Head: Normocephalic and atraumatic.      Right Ear: Tympanic membrane normal.      Left Ear: Tympanic membrane normal.      Nose: Nose normal.      Mouth/Throat:      Pharynx: Oropharynx is clear.   Eyes:      Extraocular Movements: Extraocular movements intact.      Conjunctiva/sclera: Conjunctivae normal.      Pupils: Pupils are equal, round, and reactive to light.   Neck:      Vascular: No carotid bruit.   Cardiovascular:      Rate and Rhythm: Normal rate and regular rhythm.      Pulses: Normal pulses.      Heart sounds: No murmur heard.  Pulmonary:      Effort: Pulmonary effort is normal.      Breath sounds: Normal breath sounds.   Abdominal:      Palpations: There is no hepatomegaly, splenomegaly or mass.      Tenderness: There is no abdominal tenderness.   Musculoskeletal:         General: Normal range of motion.      Cervical back: Normal range of motion.      Left lower leg: No edema.   Skin:     General: Skin is warm and dry.      Findings: No rash.   Neurological:      General: No focal deficit present.      Mental Status: She is alert. Mental status is at baseline.      Gait: Gait is intact.   Psychiatric:         Mood and Affect: Mood normal.         Thought Content: Thought content normal.       Recent Results (from the past 1344 hour(s))   CBC and Auto Differential    Collection Time: 10/09/23  4:28 AM   Result Value Ref Range    WBC 14.1 (H) 4.4 - 11.3 x10*3/uL    nRBC 0.0 0.0 - 0.0 /100 WBCs    RBC 5.05 4.00 - 5.20 x10*6/uL    Hemoglobin 15.3 12.0 - 16.0 g/dL    Hematocrit 46.6 (H) 36.0 - 46.0 %    MCV 92 80 - 100 fL    MCH 30.3 26.0 - 34.0 pg    MCHC 32.8 32.0 - 36.0 g/dL    RDW 14.0 11.5 - 14.5 %    Platelets 288 150 - 450 x10*3/uL    MPV 10.9 7.5 - 11.5 fL    Neutrophils % 70.9 40.0 - 80.0 %    Immature Granulocytes %, Automated " 0.4 0.0 - 0.9 %    Lymphocytes % 19.9 13.0 - 44.0 %    Monocytes % 6.7 2.0 - 10.0 %    Eosinophils % 1.7 0.0 - 6.0 %    Basophils % 0.4 0.0 - 2.0 %    Neutrophils Absolute 10.02 (H) 1.20 - 7.70 x10*3/uL    Immature Granulocytes Absolute, Automated 0.06 0.00 - 0.70 x10*3/uL    Lymphocytes Absolute 2.81 1.20 - 4.80 x10*3/uL    Monocytes Absolute 0.95 0.10 - 1.00 x10*3/uL    Eosinophils Absolute 0.24 0.00 - 0.70 x10*3/uL    Basophils Absolute 0.05 0.00 - 0.10 x10*3/uL   Basic metabolic panel    Collection Time: 10/09/23  4:28 AM   Result Value Ref Range    Glucose 91 74 - 99 mg/dL    Sodium 139 136 - 145 mmol/L    Potassium 3.9 3.5 - 5.3 mmol/L    Chloride 107 98 - 107 mmol/L    Bicarbonate 25 21 - 32 mmol/L    Anion Gap 11 10 - 20 mmol/L    Urea Nitrogen 21 6 - 23 mg/dL    Creatinine 0.82 0.50 - 1.05 mg/dL    eGFR 88 >60 mL/min/1.73m*2    Calcium 9.2 8.6 - 10.3 mg/dL         Assessment/Plan   Problem List Items Addressed This Visit       Arthritis of knee     Stable on Melosicam. .. Lab ok. Recheck 6 months         Relevant Medications    meloxicam (Mobic) 15 mg tablet    Accelerated essential hypertension     Bps persits in high range. On max dose Benicar-hydrochlorothiazide. He can go up on Verapamil to 240. Referred cardiology for resistant HTN. Follow up one month         Relevant Medications    olmesartan-hydrochlorothiazide (Benicar HCT) 20-12.5 mg tablet    verapamil ER (Veralan PM) 240 mg 24 hr capsule    Other Relevant Orders    Basic Metabolic Panel    Referral to Cardiology    Chronic GERD     Has been on Protonix for a couple years. Discussed risk, benefits, goals and use. No  dysphagia         Morbid obesity (CMS/HCC)     Discussed hiet, exercise, goals.          Relevant Medications    semaglutide (Rybelsus) 14 mg tablet tablet    Prediabetes     Tolerating Rybelsus much better. Followup with labs in 1=2 months         Relevant Medications    semaglutide (Rybelsus) 14 mg tablet tablet    Vitamin D  deficiency     Level was supratherapeutic so changed to 2000 units daily. Due for level with next labs.          Migraine     Doing well on topamax prophylaxis. Continue. Labs ok.         Primary insomnia     Using OTC Melatonin with success         Relevant Medications    topiramate (Topamax) 50 mg tablet     Other Visit Diagnoses       Well adult exam    -  Primary    Discussed health maintenance for age. Is losing weight and doing better              Assessment, plans, tests, and follow up discussed with patient and patient verbalized understanding. Jacqueline was given an opportunity to ask questions and  any concerns were addressed including but not limited to nuzhatz, miguel scott, calvin oc [id number]..

## 2023-10-23 DIAGNOSIS — G89.29 CHRONIC PAIN OF RIGHT KNEE: ICD-10-CM

## 2023-10-23 DIAGNOSIS — M25.561 CHRONIC PAIN OF RIGHT KNEE: ICD-10-CM

## 2023-10-23 RX ORDER — TRAMADOL HYDROCHLORIDE 50 MG/1
50 TABLET ORAL 3 TIMES DAILY PRN
Qty: 90 TABLET | Refills: 0 | Status: CANCELLED | OUTPATIENT
Start: 2023-10-23

## 2023-10-23 NOTE — ASSESSMENT & PLAN NOTE
>>ASSESSMENT AND PLAN FOR BENIGN ESSENTIAL HYPERTENSION WRITTEN ON 10/23/2023 12:57 AM BY YANG BRISCOE MD    Bps persits in high range. On max dose Benicar-hydrochlorothiazide. He can go up on Verapamil to 240. Referred cardiology for resistant HTN. Follow up one month

## 2023-10-23 NOTE — TELEPHONE ENCOUNTER
Rx Refill Request Telephone Encounter    Name:  Jacqueline Flores  :  [id number]  Medication Name:  Tramadol  Dose : 50 mg   Route : oral  Frequency : three times a day as needed   90    Specific Pharmacy location:  [facility]  Date of last appointment:  10 -20  Date of next appointment:    Best number to reach patient:  [phone]

## 2023-10-23 NOTE — ASSESSMENT & PLAN NOTE
Bps persits in high range. On max dose Benicar-hydrochlorothiazide. He can go up on Verapamil to 240. Referred cardiology for resistant HTN. Follow up one month

## 2023-10-24 ENCOUNTER — HOSPITAL ENCOUNTER (OUTPATIENT)
Dept: CARDIOLOGY | Facility: HOSPITAL | Age: 48
Discharge: HOME | End: 2023-10-24
Payer: COMMERCIAL

## 2023-10-24 PROCEDURE — 93005 ELECTROCARDIOGRAM TRACING: CPT

## 2023-10-26 RX ORDER — TRAMADOL HYDROCHLORIDE 50 MG/1
50 TABLET ORAL 3 TIMES DAILY PRN
Qty: 270 TABLET | Refills: 0 | Status: SHIPPED | OUTPATIENT
Start: 2023-10-26 | End: 2024-01-26 | Stop reason: SDUPTHER

## 2023-10-26 NOTE — TELEPHONE ENCOUNTER
Has been on chronically. Came off med list and do not understand how that happened. Taking TID for knee arthritis and unable to get surgery until weight down to 250 lbs. She is currently stable on med, no evidence of abuse, significantly improves quality of life without adverse effect.   OARRS consistent. Refilled times 90 day.     Please let patien know rx sent

## 2023-11-14 ENCOUNTER — LAB (OUTPATIENT)
Dept: LAB | Facility: LAB | Age: 48
End: 2023-11-14
Payer: COMMERCIAL

## 2023-11-14 DIAGNOSIS — I10 ACCELERATED ESSENTIAL HYPERTENSION: ICD-10-CM

## 2023-11-14 LAB
ANION GAP SERPL CALC-SCNC: 11 MMOL/L (ref 10–20)
BUN SERPL-MCNC: 22 MG/DL (ref 6–23)
CALCIUM SERPL-MCNC: 8.8 MG/DL (ref 8.6–10.3)
CHLORIDE SERPL-SCNC: 105 MMOL/L (ref 98–107)
CO2 SERPL-SCNC: 27 MMOL/L (ref 21–32)
CREAT SERPL-MCNC: 0.9 MG/DL (ref 0.5–1.05)
GFR SERPL CREATININE-BSD FRML MDRD: 79 ML/MIN/1.73M*2
GLUCOSE SERPL-MCNC: 93 MG/DL (ref 74–99)
POTASSIUM SERPL-SCNC: 4.2 MMOL/L (ref 3.5–5.3)
SODIUM SERPL-SCNC: 139 MMOL/L (ref 136–145)

## 2023-11-14 PROCEDURE — 80048 BASIC METABOLIC PNL TOTAL CA: CPT

## 2023-11-14 PROCEDURE — 36415 COLL VENOUS BLD VENIPUNCTURE: CPT

## 2023-11-16 ENCOUNTER — OFFICE VISIT (OUTPATIENT)
Dept: PRIMARY CARE | Facility: CLINIC | Age: 48
End: 2023-11-16
Payer: COMMERCIAL

## 2023-11-16 VITALS
SYSTOLIC BLOOD PRESSURE: 119 MMHG | DIASTOLIC BLOOD PRESSURE: 80 MMHG | WEIGHT: 282.4 LBS | BODY MASS INDEX: 47.05 KG/M2 | OXYGEN SATURATION: 98 % | HEART RATE: 68 BPM | HEIGHT: 65 IN

## 2023-11-16 DIAGNOSIS — E66.01 MORBID OBESITY (MULTI): ICD-10-CM

## 2023-11-16 DIAGNOSIS — E55.9 VITAMIN D DEFICIENCY: ICD-10-CM

## 2023-11-16 DIAGNOSIS — I10 ACCELERATED ESSENTIAL HYPERTENSION: Primary | ICD-10-CM

## 2023-11-16 DIAGNOSIS — R73.03 PREDIABETES: ICD-10-CM

## 2023-11-16 DIAGNOSIS — J02.9 SORE THROAT: ICD-10-CM

## 2023-11-16 DIAGNOSIS — G89.29 CHRONIC PAIN OF RIGHT KNEE: ICD-10-CM

## 2023-11-16 DIAGNOSIS — M25.561 CHRONIC PAIN OF RIGHT KNEE: ICD-10-CM

## 2023-11-16 LAB — POC RAPID STREP: NEGATIVE

## 2023-11-16 PROCEDURE — 3074F SYST BP LT 130 MM HG: CPT | Performed by: FAMILY MEDICINE

## 2023-11-16 PROCEDURE — 1036F TOBACCO NON-USER: CPT | Performed by: FAMILY MEDICINE

## 2023-11-16 PROCEDURE — 87880 STREP A ASSAY W/OPTIC: CPT | Performed by: FAMILY MEDICINE

## 2023-11-16 PROCEDURE — 99214 OFFICE O/P EST MOD 30 MIN: CPT | Performed by: FAMILY MEDICINE

## 2023-11-16 PROCEDURE — 3079F DIAST BP 80-89 MM HG: CPT | Performed by: FAMILY MEDICINE

## 2023-11-16 RX ORDER — VERAPAMIL HYDROCHLORIDE 240 MG/1
240 CAPSULE, EXTENDED RELEASE ORAL NIGHTLY
Qty: 90 CAPSULE | Refills: 3 | Status: SHIPPED | OUTPATIENT
Start: 2023-11-16 | End: 2024-11-15

## 2023-11-16 ASSESSMENT — ENCOUNTER SYMPTOMS
UNEXPECTED WEIGHT CHANGE: 0
POLYDIPSIA: 0
DIZZINESS: 0
MYALGIAS: 0
SHORTNESS OF BREATH: 0
HEADACHES: 0
POLYPHAGIA: 0
PALPITATIONS: 0
FATIGUE: 0
TROUBLE SWALLOWING: 0
NAUSEA: 0
APPETITE CHANGE: 0
SORE THROAT: 1
DIARRHEA: 0

## 2023-11-16 NOTE — PROGRESS NOTES
Subjective   Patient ID: Jacqueline Flores is a 48 y.o. female who presents for Follow-up (1 month follow up.  Sore throat and headache started yesterday. ).    Prediabetes - on Rybelsus and disappointed that she did not lose weight in the last month. She is changing her exercise.     Morbid obesity - has lost 80 lbs total. Feels much better. Topiramate was helping cravings. Takes 1-2 at night .     HTN - increased Verapamil to 240 mg. Her top number down to 120s. Bottom 70-84. No side  effects. Also on Benicar hydrochlorothiazide, Metoprolol. ,     Osteoarthritis knees. On Meloxicam with no help.  She is on Tramadol 3 times a day. Has to be below 250 to get surgery on knee. Does not need prescription today.     Got appointment with cardiology next month.                Current Outpatient Medications:     aspirin 81 mg chewable tablet, , Disp: , Rfl:     cholecalciferol (Vitamin D-3) 50 mcg (2,000 unit) capsule, Take 1 capsule (50 mcg) by mouth early in the morning.. With food, Disp: , Rfl:     meloxicam (Mobic) 15 mg tablet, Take 1 tablet (15 mg) by mouth once daily., Disp: 90 tablet, Rfl: 3    metoprolol tartrate (Lopressor) 100 mg tablet, Take 1 tablet (100 mg) by mouth in the morning and 1 tablet (100 mg) in the evening., Disp: 180 tablet, Rfl: 3    olmesartan-hydrochlorothiazide (Benicar HCT) 20-12.5 mg tablet, Take 2 tablets by mouth once daily., Disp: 180 tablet, Rfl: 3    pantoprazole (ProtoNix) 40 mg EC tablet, Take 1 tablet (40 mg) by mouth once daily., Disp: 90 tablet, Rfl: 3    semaglutide (Rybelsus) 14 mg tablet tablet, Take 1 tablet (14 mg) by mouth once daily., Disp: 90 tablet, Rfl: 3    topiramate (Topamax) 50 mg tablet, Take 1 tablet (50 mg) by mouth once daily at bedtime., Disp: 90 tablet, Rfl: 3    traMADol (Ultram) 50 mg tablet, Take 1 tablet (50 mg) by mouth 3 times a day as needed for severe pain (7 - 10) or moderate pain (4 - 6)., Disp: 270 tablet, Rfl: 0    verapamil ER (Veralan PM) 240 mg 24 hr  "capsule, Take 1 capsule (240 mg) by mouth once daily at bedtime. Do not crush or chew., Disp: 30 capsule, Rfl: 1    Patient Active Problem List   Diagnosis    Arthritis of knee    Accelerated essential hypertension    Chronic GERD    Chronic pain of right knee    Morbid obesity (CMS/HCC)    Prediabetes    Vitamin D deficiency    Migraine    Primary insomnia         Review of Systems   Constitutional:  Negative for appetite change, fatigue and unexpected weight change.   HENT:  Positive for sore throat. Negative for trouble swallowing.    Respiratory:  Negative for shortness of breath.    Cardiovascular:  Negative for chest pain, palpitations and leg swelling.   Gastrointestinal:  Negative for diarrhea and nausea.   Endocrine: Negative for cold intolerance, heat intolerance, polydipsia, polyphagia and polyuria.   Musculoskeletal:  Negative for myalgias.   Skin:  Negative for rash.   Neurological:  Negative for dizziness and headaches.       Objective   /80 (BP Location: Left arm, Patient Position: Sitting)   Pulse 68   Ht 1.651 m (5' 5\")   Wt 128 kg (282 lb 6.4 oz)   SpO2 98%   BMI 46.99 kg/m²     Physical Exam  Vitals reviewed.   Constitutional:       Appearance: Normal appearance.   Pulmonary:      Effort: Pulmonary effort is normal.   Neurological:      Mental Status: She is alert.   Psychiatric:         Mood and Affect: Mood normal.         Behavior: Behavior normal.       Recent Results (from the past 1008 hour(s))   CBC and Auto Differential    Collection Time: 10/09/23  4:28 AM   Result Value Ref Range    WBC 14.1 (H) 4.4 - 11.3 x10*3/uL    nRBC 0.0 0.0 - 0.0 /100 WBCs    RBC 5.05 4.00 - 5.20 x10*6/uL    Hemoglobin 15.3 12.0 - 16.0 g/dL    Hematocrit 46.6 (H) 36.0 - 46.0 %    MCV 92 80 - 100 fL    MCH 30.3 26.0 - 34.0 pg    MCHC 32.8 32.0 - 36.0 g/dL    RDW 14.0 11.5 - 14.5 %    Platelets 288 150 - 450 x10*3/uL    MPV 10.9 7.5 - 11.5 fL    Neutrophils % 70.9 40.0 - 80.0 %    Immature Granulocytes %, " Automated 0.4 0.0 - 0.9 %    Lymphocytes % 19.9 13.0 - 44.0 %    Monocytes % 6.7 2.0 - 10.0 %    Eosinophils % 1.7 0.0 - 6.0 %    Basophils % 0.4 0.0 - 2.0 %    Neutrophils Absolute 10.02 (H) 1.20 - 7.70 x10*3/uL    Immature Granulocytes Absolute, Automated 0.06 0.00 - 0.70 x10*3/uL    Lymphocytes Absolute 2.81 1.20 - 4.80 x10*3/uL    Monocytes Absolute 0.95 0.10 - 1.00 x10*3/uL    Eosinophils Absolute 0.24 0.00 - 0.70 x10*3/uL    Basophils Absolute 0.05 0.00 - 0.10 x10*3/uL   Basic metabolic panel    Collection Time: 10/09/23  4:28 AM   Result Value Ref Range    Glucose 91 74 - 99 mg/dL    Sodium 139 136 - 145 mmol/L    Potassium 3.9 3.5 - 5.3 mmol/L    Chloride 107 98 - 107 mmol/L    Bicarbonate 25 21 - 32 mmol/L    Anion Gap 11 10 - 20 mmol/L    Urea Nitrogen 21 6 - 23 mg/dL    Creatinine 0.82 0.50 - 1.05 mg/dL    eGFR 88 >60 mL/min/1.73m*2    Calcium 9.2 8.6 - 10.3 mg/dL   Basic Metabolic Panel    Collection Time: 11/14/23  8:20 AM   Result Value Ref Range    Glucose 93 74 - 99 mg/dL    Sodium 139 136 - 145 mmol/L    Potassium 4.2 3.5 - 5.3 mmol/L    Chloride 105 98 - 107 mmol/L    Bicarbonate 27 21 - 32 mmol/L    Anion Gap 11 10 - 20 mmol/L    Urea Nitrogen 22 6 - 23 mg/dL    Creatinine 0.90 0.50 - 1.05 mg/dL    eGFR 79 >60 mL/min/1.73m*2    Calcium 8.8 8.6 - 10.3 mg/dL         Assessment/Plan   Problem List Items Addressed This Visit       Accelerated essential hypertension - Primary     Bps were high so restarted Benicar-hydrochlorothiazide and added Verapamil at last visit.           Chronic pain of right knee     seeing ortho now. THey told her cannot have surgery until BMI is down.  She failed Voltaren gel 1 gm 4 times a day and unable to take oral NSAIDs. On Tramadol 50 mg tid for pain control pending weight loss and surgery. Improves her function and activity while she is trying to lose enough weight to get her surgery. OARRS consistent.   Continue for 90 days.          Morbid obesity (CMS/HCC)     Has  been working on lifestyle and steadily losing weight. She is needing to get knee surgery and cannot get it until weight is down. Congratulated on progress. Follow up in 3 months.          Prediabetes     Increased Rybelsus to 14 mg at last visit. Tolerating well. Lifestyle has improved. Continue. Due for A1C next labs.          Vitamin D deficiency     Recheck level with next labs.               Assessment, plans, tests, and follow up discussed with patient and patient verbalized understanding. Jacqueline was given an opportunity to ask questions and  any concerns were addressed including but not limited to meds, labs, follow up. .

## 2023-11-16 NOTE — ASSESSMENT & PLAN NOTE
seeing ortho now. THey told her cannot have surgery until BMI is down.  She failed Voltaren gel 1 gm 4 times a day and unable to take oral NSAIDs. On Tramadol 50 mg tid for pain control pending weight loss and surgery. Improves her function and activity while she is trying to lose enough weight to get her surgery. OARRS consistent.   Continue for 90 days.

## 2023-11-16 NOTE — ASSESSMENT & PLAN NOTE
Increased Rybelsus to 14 mg at last visit. Tolerating well. Lifestyle has improved. Continue. Due for A1C next labs.

## 2023-11-16 NOTE — ASSESSMENT & PLAN NOTE
Bps were high so restarted Benicar-hydrochlorothiazide and added Verapamil at last visit.  She is staying around 120/70s at home. BP better here, No side effects. Has appointment with Dr Herring upcoming. Labs and followup in 3 months.

## 2023-11-16 NOTE — ASSESSMENT & PLAN NOTE
>>ASSESSMENT AND PLAN FOR BENIGN ESSENTIAL HYPERTENSION WRITTEN ON 11/16/2023  5:35 PM BY YANG BRISCOE MD    Bps were high so restarted Benicar-hydrochlorothiazide and added Verapamil at last visit.  She is staying around 120/70s at home. BP better here, No side effects. Has appointment with Dr Herring upcoming. Labs and followup in 3 months.

## 2023-11-16 NOTE — ASSESSMENT & PLAN NOTE
Has been working on lifestyle and steadily losing weight. She is needing to get knee surgery and cannot get it until weight is down. Congratulated on progress. She is changing up exercise. Down 80 lbs. Follow up in 3 months.

## 2023-12-15 ENCOUNTER — OFFICE VISIT (OUTPATIENT)
Dept: CARDIOLOGY | Facility: CLINIC | Age: 48
End: 2023-12-15
Payer: COMMERCIAL

## 2023-12-15 VITALS
HEART RATE: 75 BPM | BODY MASS INDEX: 45.35 KG/M2 | OXYGEN SATURATION: 99 % | WEIGHT: 272.2 LBS | DIASTOLIC BLOOD PRESSURE: 86 MMHG | HEIGHT: 65 IN | SYSTOLIC BLOOD PRESSURE: 108 MMHG

## 2023-12-15 DIAGNOSIS — I10 ACCELERATED ESSENTIAL HYPERTENSION: ICD-10-CM

## 2023-12-15 DIAGNOSIS — R51.9 CHRONIC INTRACTABLE HEADACHE, UNSPECIFIED HEADACHE TYPE: Primary | ICD-10-CM

## 2023-12-15 DIAGNOSIS — R06.02 SHORTNESS OF BREATH: ICD-10-CM

## 2023-12-15 DIAGNOSIS — G89.29 CHRONIC INTRACTABLE HEADACHE, UNSPECIFIED HEADACHE TYPE: Primary | ICD-10-CM

## 2023-12-15 PROCEDURE — 99203 OFFICE O/P NEW LOW 30 MIN: CPT | Performed by: INTERNAL MEDICINE

## 2023-12-15 PROCEDURE — 1036F TOBACCO NON-USER: CPT | Performed by: INTERNAL MEDICINE

## 2023-12-15 PROCEDURE — 93000 ELECTROCARDIOGRAM COMPLETE: CPT | Performed by: INTERNAL MEDICINE

## 2023-12-15 PROCEDURE — 3074F SYST BP LT 130 MM HG: CPT | Performed by: INTERNAL MEDICINE

## 2023-12-15 PROCEDURE — 3079F DIAST BP 80-89 MM HG: CPT | Performed by: INTERNAL MEDICINE

## 2023-12-15 NOTE — LETTER
December 15, 2023     Evelin Robbins MD  [address]  [address] [place] [id number]    Patient: Jacqueline Flores   YOB: 1975   Date of Visit: 12/15/2023       Dear Dr. Evelin Robbins MD:    Thank you for referring Jacqueline Flores to me for evaluation. Below are my notes for this consultation.  If you have questions, please do not hesitate to call me. I look forward to following your patient along with you.       Sincerely,     Mario Herring MD      CC: No Recipients  ______________________________________________________________________________________    Referred by Dr. Robbins for HTN.    Counseling:  The patient was counseled regarding diagnostic results, instructions for management, risk factor reductions, prognosis, patient and family education, impressions, risks and benefits of treatment options and importance of compliance with treatment.       History Of Present Illness:    Jacqueline Flores is a 48 y.o. female patient whose PMH is significant for HTN, prediabetes, h/o TIA approx. 15 years ago, GERD, migraines and insomnia. She presents today to establish cardiovascular care for the evaluation and management of HTN. The patient reports a 10-15 year h/o HTN. Over the past year, the patient states that she has had successful weight loss with resultant drop in her blood pressure over the past 4-5 months requiring medication adjustments. Her blood pressure has subsequently started to elevate again, with spikes as high as 180 systolic. She presented to the ED on 10/09/2023 given her elevated BP and associated headaches with negative workup. Per the patient, her medications have since been readjusted by her PCP. She has a h/o TIA approximately 15 years ago in the setting of migraines. The patient's family history is positive for HTN in multiple first degree relatives, as well as her paternal grandfather passing away from a brain aneurysm.     Past Surgical History:  She has a past  "surgical history that includes Other surgical history (06/08/2021).      Social History:  She reports that she has never smoked. She has never used smokeless tobacco. She reports current alcohol use. She reports that she does not use drugs.    Family History:  Family History   Problem Relation Name Age of Onset   • Breast cancer Mother     • Leukemia Mother     • Breast cancer Father     • Cerebral aneurysm Maternal Grandfather     • Esophageal cancer Paternal Grandmother     • Leukemia Other     • Brain cancer Other Aunt         Allergies:  Patient has no known allergies.    Outpatient Medications:  Current Outpatient Medications   Medication Instructions   • aspirin 81 mg chewable tablet No dose, route, or frequency recorded.   • cholecalciferol (VITAMIN D-3) 50 mcg, oral, Daily, With food   • meloxicam (MOBIC) 15 mg, oral, Daily   • metoprolol tartrate (LOPRESSOR) 100 mg, oral, Every 12 hours   • olmesartan-hydrochlorothiazide (Benicar HCT) 20-12.5 mg tablet 2 tablets, oral, Daily   • pantoprazole (PROTONIX) 40 mg, oral, Daily   • semaglutide (RYBELSUS) 14 mg, oral, Daily   • topiramate (TOPAMAX) 50 mg, oral, Nightly   • traMADol (ULTRAM) 50 mg, oral, 3 times daily PRN   • verapamil ER (VERALAN PM) 240 mg, oral, Nightly, Do not crush or chew.        Last Recorded Vitals:  Vitals:    12/15/23 1635   BP: 108/86   BP Location: Left arm   Patient Position: Sitting   BP Cuff Size: Large adult   Pulse: 75   SpO2: 99%   Weight: 123 kg (272 lb 3.2 oz)   Height: 1.651 m (5' 5\")       Review of Systems   All other systems reviewed and are negative.     Physical Exam:  Constitutional:       Appearance: Healthy appearance. Not in distress.   Neck:      Vascular: No JVR. JVD normal.   Pulmonary:      Effort: Pulmonary effort is normal.      Breath sounds: Normal breath sounds. No wheezing. No rhonchi. No rales.   Chest:      Chest wall: Not tender to palpatation.   Cardiovascular:      PMI at left midclavicular line. Normal " rate. Regular rhythm. Normal S1. Normal S2.       Murmurs: There is no murmur.      No gallop.  No click. No rub.   Pulses:     Intact distal pulses.   Edema:     Peripheral edema absent.   Abdominal:      General: Bowel sounds are normal.      Palpations: Abdomen is soft.      Tenderness: There is no abdominal tenderness.   Musculoskeletal: Normal range of motion.         General: No tenderness. Skin:     General: Skin is warm and dry.   Neurological:      General: No focal deficit present.      Mental Status: Alert and oriented to person, place and time.            Last Labs:  CBC -  Lab Results   Component Value Date    WBC 14.1 (H) 10/09/2023    HGB 15.3 10/09/2023    HCT 46.6 (H) 10/09/2023    MCV 92 10/09/2023     10/09/2023       CMP -  Lab Results   Component Value Date    CALCIUM 8.8 11/14/2023    PROT 6.5 04/04/2023    ALBUMIN 3.9 04/04/2023    AST 20 04/04/2023    ALT 11 04/04/2023    ALKPHOS 51 04/04/2023    BILITOT 0.8 04/04/2023       LIPID PANEL -   Lab Results   Component Value Date    CHOL 178 07/05/2023    TRIG 131 07/05/2023    HDL 48.6 07/05/2023    CHHDL 3.7 07/05/2023    LDLF 103 (H) 07/05/2023    VLDL 26 07/05/2023       RENAL FUNCTION PANEL -   Lab Results   Component Value Date    GLUCOSE 93 11/14/2023     11/14/2023    K 4.2 11/14/2023     11/14/2023    CO2 27 11/14/2023    ANIONGAP 11 11/14/2023    BUN 22 11/14/2023    CREATININE 0.90 11/14/2023    CALCIUM 8.8 11/14/2023    ALBUMIN 3.9 04/04/2023        Lab Results   Component Value Date    HGBA1C 5.7 (A) 07/05/2023       Last Cardiology Tests:  N/A    Assessment/Plan  1) HTN  10-15 year h/o HTN  Successful weight loss over past year resulting in drop in BP requiring medication adjustments  BP has subsequently started to elevated again, with spikes as high as 180 systolic  ED evaluation 10/09/2023 with elevated BP and associated headaches - negative workup  Currently on metoprolol tartrate 100 mg BID, olmesartan-HCTZ  20-12.5 mg daily, verapamil 240 mg   H/O TIA approximately 15 years ago in the setting of migraines  FH positive for HTN in multiple first degree relatives, brain aneurysm in paternal grandfather causing death   Check CTA Head/Neck  Check stress echo  Check renal U/S  Check serum creatinine, plasma metanephrines, aldosterone/renin, cortisol,         Scribe Attestation  By signing my name below, I, Barb Ambrocio   attest that this documentation has been prepared under the direction and in the presence of Mario Herring MD.

## 2023-12-15 NOTE — PATIENT INSTRUCTIONS
Continue all current medications as prescribed.   Dr. Herring has ordered the following tests: CT angiogram of the head/neck, stress test to ensure your heart is getting adequate blood flow and there is no evidence of any blockages within the heart arteries, ultrasound of the kidney arteries to look for any blockages there that could be contributing to your elevated blood pressure.  Please have blood work drawn.  Followup with Dr. Herring after the above tests.    If you have any questions or cardiac concerns, please call our office at [phone].

## 2023-12-15 NOTE — PROGRESS NOTES
Referred by Dr. Robbins for HTN.    Counseling:  The patient was counseled regarding diagnostic results, instructions for management, risk factor reductions, prognosis, patient and family education, impressions, risks and benefits of treatment options and importance of compliance with treatment.       History Of Present Illness:    Jacqueline Flores is a 48 y.o. female patient whose PMH is significant for HTN, prediabetes, h/o TIA approx. 15 years ago, GERD, migraines and insomnia. She presents today to establish cardiovascular care for the evaluation and management of HTN. The patient reports a 10-15 year h/o HTN. Over the past year, the patient states that she has had successful weight loss with resultant drop in her blood pressure over the past 4-5 months requiring medication adjustments. Her blood pressure has subsequently started to elevate again, with spikes as high as 180 systolic. She presented to the ED on 10/09/2023 given her elevated BP and associated headaches with negative workup. Per the patient, her medications have since been readjusted by her PCP. She has a h/o TIA approximately 15 years ago in the setting of migraines. The patient's family history is positive for HTN in multiple first degree relatives, as well as her paternal grandfather passing away from a brain aneurysm.     Past Surgical History:  She has a past surgical history that includes Other surgical history (06/08/2021).      Social History:  She reports that she has never smoked. She has never used smokeless tobacco. She reports current alcohol use. She reports that she does not use drugs.    Family History:  Family History   Problem Relation Name Age of Onset    Breast cancer Mother      Leukemia Mother      Breast cancer Father      Cerebral aneurysm Maternal Grandfather      Esophageal cancer Paternal Grandmother      Leukemia Other      Brain cancer Other Aunt         Allergies:  Patient has no known allergies.    Outpatient  "Medications:  Current Outpatient Medications   Medication Instructions    aspirin 81 mg chewable tablet No dose, route, or frequency recorded.    cholecalciferol (VITAMIN D-3) 50 mcg, oral, Daily, With food    meloxicam (MOBIC) 15 mg, oral, Daily    metoprolol tartrate (LOPRESSOR) 100 mg, oral, Every 12 hours    olmesartan-hydrochlorothiazide (Benicar HCT) 20-12.5 mg tablet 2 tablets, oral, Daily    pantoprazole (PROTONIX) 40 mg, oral, Daily    semaglutide (RYBELSUS) 14 mg, oral, Daily    topiramate (TOPAMAX) 50 mg, oral, Nightly    traMADol (ULTRAM) 50 mg, oral, 3 times daily PRN    verapamil ER (VERALAN PM) 240 mg, oral, Nightly, Do not crush or chew.        Last Recorded Vitals:  Vitals:    12/15/23 1635   BP: 108/86   BP Location: Left arm   Patient Position: Sitting   BP Cuff Size: Large adult   Pulse: 75   SpO2: 99%   Weight: 123 kg (272 lb 3.2 oz)   Height: 1.651 m (5' 5\")       Review of Systems   All other systems reviewed and are negative.     Physical Exam:  Constitutional:       Appearance: Healthy appearance. Not in distress.   Neck:      Vascular: No JVR. JVD normal.   Pulmonary:      Effort: Pulmonary effort is normal.      Breath sounds: Normal breath sounds. No wheezing. No rhonchi. No rales.   Chest:      Chest wall: Not tender to palpatation.   Cardiovascular:      PMI at left midclavicular line. Normal rate. Regular rhythm. Normal S1. Normal S2.       Murmurs: There is no murmur.      No gallop.  No click. No rub.   Pulses:     Intact distal pulses.   Edema:     Peripheral edema absent.   Abdominal:      General: Bowel sounds are normal.      Palpations: Abdomen is soft.      Tenderness: There is no abdominal tenderness.   Musculoskeletal: Normal range of motion.         General: No tenderness. Skin:     General: Skin is warm and dry.   Neurological:      General: No focal deficit present.      Mental Status: Alert and oriented to person, place and time.            Last Labs:  CBC -  Lab Results "   Component Value Date    WBC 14.1 (H) 10/09/2023    HGB 15.3 10/09/2023    HCT 46.6 (H) 10/09/2023    MCV 92 10/09/2023     10/09/2023       CMP -  Lab Results   Component Value Date    CALCIUM 8.8 11/14/2023    PROT 6.5 04/04/2023    ALBUMIN 3.9 04/04/2023    AST 20 04/04/2023    ALT 11 04/04/2023    ALKPHOS 51 04/04/2023    BILITOT 0.8 04/04/2023       LIPID PANEL -   Lab Results   Component Value Date    CHOL 178 07/05/2023    TRIG 131 07/05/2023    HDL 48.6 07/05/2023    CHHDL 3.7 07/05/2023    LDLF 103 (H) 07/05/2023    VLDL 26 07/05/2023       RENAL FUNCTION PANEL -   Lab Results   Component Value Date    GLUCOSE 93 11/14/2023     11/14/2023    K 4.2 11/14/2023     11/14/2023    CO2 27 11/14/2023    ANIONGAP 11 11/14/2023    BUN 22 11/14/2023    CREATININE 0.90 11/14/2023    CALCIUM 8.8 11/14/2023    ALBUMIN 3.9 04/04/2023        Lab Results   Component Value Date    HGBA1C 5.7 (A) 07/05/2023       Last Cardiology Tests:  N/A    Assessment/Plan   1) HTN  10-15 year h/o HTN  Successful weight loss over past year resulting in drop in BP requiring medication adjustments  BP has subsequently started to elevated again, with spikes as high as 180 systolic  ED evaluation 10/09/2023 with elevated BP and associated headaches - negative workup  Currently on metoprolol tartrate 100 mg BID, olmesartan-HCTZ 20-12.5 mg daily, verapamil 240 mg   H/O TIA approximately 15 years ago in the setting of migraines  FH positive for HTN in multiple first degree relatives, brain aneurysm in paternal grandfather causing death   Check CTA Head/Neck  Check stress echo  Check renal U/S  Check serum creatinine, plasma metanephrines, aldosterone/renin, cortisol,         Scribe Attestation  By signing my name below, I, Barb Ambrocio   attest that this documentation has been prepared under the direction and in the presence of Mario Herring MD.

## 2024-01-15 ENCOUNTER — APPOINTMENT (OUTPATIENT)
Dept: PRIMARY CARE | Facility: CLINIC | Age: 49
End: 2024-01-15
Payer: COMMERCIAL

## 2024-01-19 ENCOUNTER — APPOINTMENT (OUTPATIENT)
Dept: CARDIOLOGY | Facility: HOSPITAL | Age: 49
End: 2024-01-19
Payer: COMMERCIAL

## 2024-01-19 ENCOUNTER — APPOINTMENT (OUTPATIENT)
Dept: RADIOLOGY | Facility: HOSPITAL | Age: 49
End: 2024-01-19
Payer: COMMERCIAL

## 2024-01-19 ENCOUNTER — APPOINTMENT (OUTPATIENT)
Dept: VASCULAR MEDICINE | Facility: HOSPITAL | Age: 49
End: 2024-01-19
Payer: COMMERCIAL

## 2024-01-24 ENCOUNTER — LAB (OUTPATIENT)
Dept: LAB | Facility: LAB | Age: 49
End: 2024-01-24
Payer: COMMERCIAL

## 2024-01-24 DIAGNOSIS — R06.02 SHORTNESS OF BREATH: ICD-10-CM

## 2024-01-24 DIAGNOSIS — E55.9 VITAMIN D DEFICIENCY: ICD-10-CM

## 2024-01-24 DIAGNOSIS — R73.03 PREDIABETES: ICD-10-CM

## 2024-01-24 DIAGNOSIS — G89.29 CHRONIC INTRACTABLE HEADACHE, UNSPECIFIED HEADACHE TYPE: ICD-10-CM

## 2024-01-24 DIAGNOSIS — I10 ACCELERATED ESSENTIAL HYPERTENSION: ICD-10-CM

## 2024-01-24 DIAGNOSIS — R51.9 CHRONIC INTRACTABLE HEADACHE, UNSPECIFIED HEADACHE TYPE: ICD-10-CM

## 2024-01-24 LAB
CHOLEST SERPL-MCNC: 191 MG/DL (ref 0–199)
CHOLESTEROL/HDL RATIO: 4.2
CREAT SERPL-MCNC: 0.97 MG/DL (ref 0.5–1.05)
EGFRCR SERPLBLD CKD-EPI 2021: 72 ML/MIN/1.73M*2
EST. AVERAGE GLUCOSE BLD GHB EST-MCNC: 108 MG/DL
HBA1C MFR BLD: 5.4 %
HDLC SERPL-MCNC: 45.7 MG/DL
LDLC SERPL CALC-MCNC: 118 MG/DL
NON HDL CHOLESTEROL: 145 MG/DL (ref 0–149)
TRIGL SERPL-MCNC: 135 MG/DL (ref 0–149)
VLDL: 27 MG/DL (ref 0–40)

## 2024-01-24 PROCEDURE — 82088 ASSAY OF ALDOSTERONE: CPT

## 2024-01-24 PROCEDURE — 36415 COLL VENOUS BLD VENIPUNCTURE: CPT

## 2024-01-24 PROCEDURE — 82530 CORTISOL FREE: CPT

## 2024-01-24 PROCEDURE — 83036 HEMOGLOBIN GLYCOSYLATED A1C: CPT

## 2024-01-24 PROCEDURE — 80061 LIPID PANEL: CPT

## 2024-01-24 PROCEDURE — 83835 ASSAY OF METANEPHRINES: CPT

## 2024-01-24 PROCEDURE — 82565 ASSAY OF CREATININE: CPT

## 2024-01-24 PROCEDURE — 82652 VIT D 1 25-DIHYDROXY: CPT

## 2024-01-26 ENCOUNTER — OFFICE VISIT (OUTPATIENT)
Dept: PRIMARY CARE | Facility: CLINIC | Age: 49
End: 2024-01-26
Payer: COMMERCIAL

## 2024-01-26 VITALS
TEMPERATURE: 95 F | HEIGHT: 65 IN | WEIGHT: 270.8 LBS | BODY MASS INDEX: 45.12 KG/M2 | OXYGEN SATURATION: 98 % | SYSTOLIC BLOOD PRESSURE: 114 MMHG | DIASTOLIC BLOOD PRESSURE: 78 MMHG | HEART RATE: 78 BPM

## 2024-01-26 DIAGNOSIS — I10 ACCELERATED HYPERTENSION: ICD-10-CM

## 2024-01-26 DIAGNOSIS — E55.9 VITAMIN D DEFICIENCY: ICD-10-CM

## 2024-01-26 DIAGNOSIS — R73.03 PREDIABETES: Primary | ICD-10-CM

## 2024-01-26 DIAGNOSIS — M25.561 CHRONIC PAIN OF RIGHT KNEE: ICD-10-CM

## 2024-01-26 DIAGNOSIS — E66.01 MORBID OBESITY (MULTI): ICD-10-CM

## 2024-01-26 DIAGNOSIS — M17.10 ARTHRITIS OF KNEE: ICD-10-CM

## 2024-01-26 DIAGNOSIS — I10 BENIGN ESSENTIAL HYPERTENSION: ICD-10-CM

## 2024-01-26 DIAGNOSIS — G89.29 CHRONIC PAIN OF RIGHT KNEE: ICD-10-CM

## 2024-01-26 PROBLEM — J30.2 SEASONAL ALLERGIES: Status: ACTIVE | Noted: 2024-01-26

## 2024-01-26 PROBLEM — J02.9 SORE THROAT: Status: RESOLVED | Noted: 2023-11-16 | Resolved: 2024-01-26

## 2024-01-26 PROBLEM — M25.569 KNEE PAIN: Status: ACTIVE | Noted: 2023-04-05

## 2024-01-26 PROCEDURE — 1036F TOBACCO NON-USER: CPT | Performed by: FAMILY MEDICINE

## 2024-01-26 PROCEDURE — 3078F DIAST BP <80 MM HG: CPT | Performed by: FAMILY MEDICINE

## 2024-01-26 PROCEDURE — 99214 OFFICE O/P EST MOD 30 MIN: CPT | Performed by: FAMILY MEDICINE

## 2024-01-26 PROCEDURE — 3074F SYST BP LT 130 MM HG: CPT | Performed by: FAMILY MEDICINE

## 2024-01-26 RX ORDER — TRAMADOL HYDROCHLORIDE 50 MG/1
50 TABLET ORAL 3 TIMES DAILY PRN
Qty: 270 TABLET | Refills: 0 | Status: SHIPPED | OUTPATIENT
Start: 2024-01-26 | End: 2024-05-06 | Stop reason: SDUPTHER

## 2024-01-26 NOTE — PROGRESS NOTES
Subjective   Patient ID: Jacqueline Flores is a 48 y.o. female who presents for Follow-up (2 month follow up labs ).    Saw Dr. Herring re uncontrolled hypertension in the interim. He ordered CTA head/neck, vascular US renal, stress echo, metanephrines. She has to get her testing at [facility] and has it all scheduled in next couple weeks. Still on Verapamil, Metoprolol, Benicar hydrochlorothiazide. Has been keeping track of Bps and has follow up appointment. ,     Prediabetes/Obesity - Increased Exercise. She is on Rybelsus 14 mg daily. She started at 365 and is pleased with her weight loss down to 270 over the last year.     Knee arthritis. On Meloxicam which helps some but ambulation difficult with that. She is on Tramadol 50 mg tid prn which enables her to get moving and go to work. She is not eligible for knee replacement until weight down to 250 lbs. No side effects of Tramadol. OARRS without evidence of misuse.                    Current Outpatient Medications:     aspirin 81 mg chewable tablet, , Disp: , Rfl:     cholecalciferol (Vitamin D-3) 50 mcg (2,000 unit) capsule, Take 1 capsule (50 mcg) by mouth early in the morning.. With food, Disp: , Rfl:     meloxicam (Mobic) 15 mg tablet, Take 1 tablet (15 mg) by mouth once daily., Disp: 90 tablet, Rfl: 3    metoprolol tartrate (Lopressor) 100 mg tablet, Take 1 tablet (100 mg) by mouth in the morning and 1 tablet (100 mg) in the evening., Disp: 180 tablet, Rfl: 3    olmesartan-hydrochlorothiazide (Benicar HCT) 20-12.5 mg tablet, Take 2 tablets by mouth once daily., Disp: 180 tablet, Rfl: 3    pantoprazole (ProtoNix) 40 mg EC tablet, Take 1 tablet (40 mg) by mouth once daily., Disp: 90 tablet, Rfl: 3    semaglutide (Rybelsus) 14 mg tablet tablet, Take 1 tablet (14 mg) by mouth once daily., Disp: 90 tablet, Rfl: 3    topiramate (Topamax) 50 mg tablet, Take 1 tablet (50 mg) by mouth once daily at bedtime., Disp: 90 tablet, Rfl: 3    verapamil ER (Veralan PM) 240 mg 24 hr  "capsule, Take 1 capsule (240 mg) by mouth once daily at bedtime. Do not crush or chew., Disp: 90 capsule, Rfl: 3    traMADol (Ultram) 50 mg tablet, Take 1 tablet (50 mg) by mouth 3 times a day as needed for severe pain (7 - 10) or moderate pain (4 - 6)., Disp: 270 tablet, Rfl: 0    Patient Active Problem List   Diagnosis    Arthritis of knee    Gastroesophageal reflux disease    Knee pain    Morbid obesity (CMS/HCC)    Prediabetes    Vitamin D deficiency    Migraine headache    Primary insomnia    Seasonal allergies    Accelerated hypertension         Review of Systems   Constitutional:  Negative for unexpected weight change.   Respiratory:  Negative for cough and shortness of breath.    Cardiovascular:  Negative for chest pain, palpitations and leg swelling.   Genitourinary:  Negative for dysuria.   Skin:  Negative for rash.   Neurological:  Positive for syncope. Negative for headaches.   Psychiatric/Behavioral:  Negative for confusion.        Objective   /78 (BP Location: Left arm, Patient Position: Sitting)   Pulse 78   Temp 35 °C (95 °F)   Ht 1.651 m (5' 5\")   Wt 123 kg (270 lb 12.8 oz)   SpO2 98%   BMI 45.06 kg/m²     Physical Exam  Vitals reviewed.   Constitutional:       General: She is not in acute distress.     Appearance: Normal appearance. She is not ill-appearing.   Neck:      Vascular: No carotid bruit.   Cardiovascular:      Rate and Rhythm: Normal rate and regular rhythm.      Pulses: Normal pulses.      Heart sounds: No murmur heard.  Pulmonary:      Effort: Pulmonary effort is normal.      Breath sounds: Normal breath sounds.   Musculoskeletal:      Left lower leg: No edema.   Skin:     Findings: No rash.   Neurological:      Mental Status: She is alert.   Psychiatric:         Mood and Affect: Mood normal.         Behavior: Behavior normal.         Assessment/Plan   Problem List Items Addressed This Visit       Arthritis of knee     She is getting close to weight where she can qualify " for her surgery. Only able to take Tramadol and it does not help enough. Unable to tolerate other types of NSAIDs. She is able to have her knee replacement once she is down to 250 lbs.            Knee pain    Relevant Medications    traMADol (Ultram) 50 mg tablet    Morbid obesity (CMS/HCC)     She is doing better with Tramadol. OARRS reviewed and is consistent. She is feeling well.          Prediabetes - Primary     Is on Rybelsus 14 mg and feeling well. She has been working on her diet and increasing exercise as can with Tramadol helping her pain somewhat.          Vitamin D deficiency     Repeat vitamin D with next labs.          Accelerated hypertension     Has marked increase and lability in Blood pressure.           Other Visit Diagnoses       Benign essential hypertension                  Assessment, plans, tests, and follow up discussed with patient and patient verbalized understanding. Jacqueline was given an opportunity to ask questions and  any concerns were addressed including but not limited to labs, medications, follow up .

## 2024-01-27 LAB — 1,25(OH)2D3 SERPL-MCNC: 34.6 PG/ML (ref 19.9–79.3)

## 2024-01-28 LAB
ALDOST SERPL-MCNC: 24.5 NG/DL
ALDOST/RENIN PLAS-RTO: 3.1 RATIO
ANNOTATION COMMENT IMP: NORMAL
METANEPHS SERPL-SCNC: 0.37 NMOL/L (ref 0–0.49)
NORMETANEPH FREE SERPL-SCNC: 0.48 NMOL/L (ref 0–0.89)
RENIN PLAS-CCNC: 8 NG/ML/HR

## 2024-01-28 ASSESSMENT — ENCOUNTER SYMPTOMS
COUGH: 0
HEADACHES: 0
CONFUSION: 0
PALPITATIONS: 0
SHORTNESS OF BREATH: 0
UNEXPECTED WEIGHT CHANGE: 0
DYSURIA: 0

## 2024-01-28 NOTE — ASSESSMENT & PLAN NOTE
Is on Rybelsus 14 mg and feeling well. She has been working on her diet and increasing exercise as can with Tramadol helping her pain somewhat.

## 2024-01-28 NOTE — ASSESSMENT & PLAN NOTE
She is getting close to weight where she can qualify for her surgery. Only able to take Tramadol and it does not help enough. Unable to tolerate other types of NSAIDs. She is able to have her knee replacement once she is down to 250 lbs.

## 2024-01-30 LAB — CORTIS F SERPL-MCNC: 0.86 UG/DL

## 2024-02-04 ENCOUNTER — TELEPHONE (OUTPATIENT)
Dept: PRIMARY CARE | Facility: CLINIC | Age: 49
End: 2024-02-04
Payer: COMMERCIAL

## 2024-02-05 NOTE — TELEPHONE ENCOUNTER
Her lab results showed that his kidney and liver functions are good. Cholesterol went  up a little. His LDL is up to 119 from 106. Still not high enough to put on medication at this time.Expect that will get better when more activity.     Vitamin D is good.     A1C is down to 5.4 which is great. Last one was 5.7.

## 2024-04-08 ENCOUNTER — TELEPHONE (OUTPATIENT)
Dept: CARDIOLOGY | Facility: CLINIC | Age: 49
End: 2024-04-08

## 2024-04-08 ENCOUNTER — HOSPITAL ENCOUNTER (OUTPATIENT)
Dept: CARDIOLOGY | Facility: HOSPITAL | Age: 49
Discharge: HOME | End: 2024-04-08
Payer: COMMERCIAL

## 2024-04-08 VITALS — WEIGHT: 260.14 LBS | BODY MASS INDEX: 43.29 KG/M2

## 2024-04-08 DIAGNOSIS — I63.9 CEREBROVASCULAR ACCIDENT (CVA), UNSPECIFIED MECHANISM (MULTI): Primary | ICD-10-CM

## 2024-04-08 DIAGNOSIS — I10 ACCELERATED ESSENTIAL HYPERTENSION: ICD-10-CM

## 2024-04-08 DIAGNOSIS — R06.02 SHORTNESS OF BREATH: ICD-10-CM

## 2024-04-08 DIAGNOSIS — G89.29 CHRONIC INTRACTABLE HEADACHE, UNSPECIFIED HEADACHE TYPE: ICD-10-CM

## 2024-04-08 DIAGNOSIS — R51.9 CHRONIC INTRACTABLE HEADACHE, UNSPECIFIED HEADACHE TYPE: ICD-10-CM

## 2024-04-08 PROCEDURE — 93018 CV STRESS TEST I&R ONLY: CPT | Performed by: INTERNAL MEDICINE

## 2024-04-08 PROCEDURE — 93017 CV STRESS TEST TRACING ONLY: CPT

## 2024-04-08 PROCEDURE — 2500000005 HC RX 250 GENERAL PHARMACY W/O HCPCS: Performed by: INTERNAL MEDICINE

## 2024-04-08 PROCEDURE — 93016 CV STRESS TEST SUPVJ ONLY: CPT | Performed by: INTERNAL MEDICINE

## 2024-04-08 PROCEDURE — 93350 STRESS TTE ONLY: CPT | Performed by: INTERNAL MEDICINE

## 2024-04-08 PROCEDURE — 2500000004 HC RX 250 GENERAL PHARMACY W/ HCPCS (ALT 636 FOR OP/ED): Performed by: INTERNAL MEDICINE

## 2024-04-08 RX ORDER — ATROPINE SULFATE 0.1 MG/ML
1.5 INJECTION INTRAVENOUS ONCE
Status: COMPLETED | OUTPATIENT
Start: 2024-04-08 | End: 2024-04-08

## 2024-04-08 RX ORDER — METOPROLOL TARTRATE 1 MG/ML
5 INJECTION, SOLUTION INTRAVENOUS ONCE
Status: DISCONTINUED | OUTPATIENT
Start: 2024-04-08 | End: 2024-04-09 | Stop reason: HOSPADM

## 2024-04-08 RX ORDER — METOPROLOL TARTRATE 1 MG/ML
5 INJECTION, SOLUTION INTRAVENOUS ONCE
Status: COMPLETED | OUTPATIENT
Start: 2024-04-08 | End: 2024-04-08

## 2024-04-08 RX ADMIN — ATROPINE SULFATE 1.5 MG: 0.1 INJECTION INTRAVENOUS at 10:27

## 2024-04-08 RX ADMIN — DEXTROSE MONOHYDRATE 40 MCG/KG/MIN: 50 INJECTION, SOLUTION INTRAVENOUS at 10:28

## 2024-04-08 RX ADMIN — METOPROLOL TARTRATE 5 MG: 5 INJECTION INTRAVENOUS at 10:26

## 2024-04-08 RX ADMIN — PERFLUTREN 2 ML OF DILUTION: 6.52 INJECTION, SUSPENSION INTRAVENOUS at 10:26

## 2024-04-08 NOTE — TELEPHONE ENCOUNTER
----- Message from Mario Herring MD sent at 4/8/2024  9:30 AM EDT -----  Regarding: RE: CT Results from 2/21  Looks like CT showed old lacunar infarct. She needs a 14 day Holter to R/O atrial fib and echo with bubble study  ----- Message -----  From: Courtney Bright RN  Sent: 4/8/2024   9:19 AM EDT  To: Mario Herring MD  Subject: FW: CT Results from 2/21                           ----- Message -----  From: Frank Reis  Sent: 4/8/2024   8:53 AM EDT  To: Courtney Bright RN  Subject: CT Results from 2/21                               Jacqueline Molina stopped by today because she had a CT done through the [facility] on 2/21 and they were recommending that she follow up with Nevae[facility] but she has not heard anything.      She is having a Stress Echo this morning, so I told her I would send you a message to review that CT along with the Echo to see if she needs an appointment right away.    Thank you.

## 2024-04-12 NOTE — TELEPHONE ENCOUNTER
I called and left voicemail for patient. I will send message via [employer] and order testing and send task to Dragan to schedule. Patient to call back if she has questions.

## 2024-04-16 ENCOUNTER — TELEPHONE (OUTPATIENT)
Dept: CARDIOLOGY | Facility: CLINIC | Age: 49
End: 2024-04-16
Payer: COMMERCIAL

## 2024-05-01 ENCOUNTER — APPOINTMENT (OUTPATIENT)
Dept: CARDIOLOGY | Facility: CLINIC | Age: 49
End: 2024-05-01
Payer: COMMERCIAL

## 2024-05-03 ENCOUNTER — APPOINTMENT (OUTPATIENT)
Dept: PRIMARY CARE | Facility: CLINIC | Age: 49
End: 2024-05-03
Payer: COMMERCIAL

## 2024-05-06 ENCOUNTER — ANCILLARY PROCEDURE (OUTPATIENT)
Dept: CARDIOLOGY | Facility: CLINIC | Age: 49
End: 2024-05-06
Payer: COMMERCIAL

## 2024-05-06 ENCOUNTER — OFFICE VISIT (OUTPATIENT)
Dept: PRIMARY CARE | Facility: CLINIC | Age: 49
End: 2024-05-06
Payer: COMMERCIAL

## 2024-05-06 ENCOUNTER — APPOINTMENT (OUTPATIENT)
Dept: RADIOLOGY | Facility: HOSPITAL | Age: 49
End: 2024-05-06
Payer: COMMERCIAL

## 2024-05-06 VITALS
DIASTOLIC BLOOD PRESSURE: 81 MMHG | HEART RATE: 70 BPM | OXYGEN SATURATION: 98 % | HEIGHT: 65 IN | WEIGHT: 261.6 LBS | TEMPERATURE: 96.8 F | SYSTOLIC BLOOD PRESSURE: 116 MMHG | BODY MASS INDEX: 43.58 KG/M2

## 2024-05-06 DIAGNOSIS — E66.01 MORBID OBESITY (MULTI): ICD-10-CM

## 2024-05-06 DIAGNOSIS — G89.29 CHRONIC PAIN OF RIGHT KNEE: Primary | ICD-10-CM

## 2024-05-06 DIAGNOSIS — M17.10 ARTHRITIS OF KNEE: ICD-10-CM

## 2024-05-06 DIAGNOSIS — M79.89 MASS OF SOFT TISSUE OF RIGHT LOWER EXTREMITY: ICD-10-CM

## 2024-05-06 DIAGNOSIS — R73.03 PREDIABETES: ICD-10-CM

## 2024-05-06 DIAGNOSIS — I10 ACCELERATED HYPERTENSION: ICD-10-CM

## 2024-05-06 DIAGNOSIS — M25.561 CHRONIC PAIN OF RIGHT KNEE: Primary | ICD-10-CM

## 2024-05-06 DIAGNOSIS — I63.9 CEREBROVASCULAR ACCIDENT (CVA), UNSPECIFIED MECHANISM (MULTI): ICD-10-CM

## 2024-05-06 LAB
POC AMPHETAMINE: NEGATIVE NG/ML
POC BARBITURATES: NEGATIVE NG/ML
POC BENZODIAZEPINES: NEGATIVE NG/ML
POC BUPRENORPHINE URINE: NEGATIVE NG/ML
POC COCAINE: NEGATIVE NG/ML
POC MDMA (NG/ML) IN URINE: NEGATIVE NG/ML
POC METHADONE MANUALLY ENTERED: NEGATIVE NG/ML
POC METHAMPHETAMINE: NEGATIVE NG/ML
POC MORPHINE URINE: NEGATIVE NG/ML
POC OPIATES: NORMAL NG/ML
POC OXYCODONE: NEGATIVE NG/ML
POC PHENCYCLIDINE (PCP): NEGATIVE NG/ML
POC THC: NEGATIVE NG/ML
POC TICYCLIC ANTIDEPRESSANTS (TCA): NORMAL NG/ML

## 2024-05-06 PROCEDURE — 80373 DRUG SCREENING TRAMADOL: CPT

## 2024-05-06 PROCEDURE — 3079F DIAST BP 80-89 MM HG: CPT | Performed by: FAMILY MEDICINE

## 2024-05-06 PROCEDURE — 3074F SYST BP LT 130 MM HG: CPT | Performed by: FAMILY MEDICINE

## 2024-05-06 PROCEDURE — 93246 EXT ECG>7D<15D RECORDING: CPT | Performed by: INTERNAL MEDICINE

## 2024-05-06 PROCEDURE — 93248 EXT ECG>7D<15D REV&INTERPJ: CPT | Performed by: INTERNAL MEDICINE

## 2024-05-06 PROCEDURE — 80305 DRUG TEST PRSMV DIR OPT OBS: CPT | Performed by: FAMILY MEDICINE

## 2024-05-06 PROCEDURE — 1036F TOBACCO NON-USER: CPT | Performed by: FAMILY MEDICINE

## 2024-05-06 PROCEDURE — G2211 COMPLEX E/M VISIT ADD ON: HCPCS | Performed by: FAMILY MEDICINE

## 2024-05-06 PROCEDURE — 99214 OFFICE O/P EST MOD 30 MIN: CPT | Performed by: FAMILY MEDICINE

## 2024-05-06 RX ORDER — TRAMADOL HYDROCHLORIDE 50 MG/1
50 TABLET ORAL 3 TIMES DAILY PRN
Qty: 270 TABLET | Refills: 0 | Status: SHIPPED | OUTPATIENT
Start: 2024-05-06 | End: 2024-08-04

## 2024-05-06 ASSESSMENT — ENCOUNTER SYMPTOMS
SHORTNESS OF BREATH: 0
UNEXPECTED WEIGHT CHANGE: 0
PALPITATIONS: 0
HEADACHES: 0
CONFUSION: 0
COUGH: 0

## 2024-05-06 NOTE — ASSESSMENT & PLAN NOTE
On Rybelsus 14 mg daily. Last A1C 3 months ago was 5.4, has lost lot of weight. She is feeling like it no longer helps her appetite. Will refer to pharmacy to see if she can get another GLP1 such as Zepbound or Mounjaro. Recheck labs in 3 months.    113

## 2024-05-06 NOTE — ASSESSMENT & PLAN NOTE
On Chronic tramadol. Not able to take oral NSAIDs.   No side effects. OARRS consistent with prescription history. Continue medication. She is trying to get new knee surgeon. Will check with insurance. CSA done today. UDS today. Recheck in 3 months with lab prior to visit. Needs current knee xrays for this. ordered

## 2024-05-06 NOTE — ASSESSMENT & PLAN NOTE
On Tramadol and  has lost over 100 lb, onlY 10 lb from goal weight for surgery. Has to have done through special program at [facility]. She will contact them but told her that she needs current xrays. Ordered.

## 2024-05-06 NOTE — PATIENT INSTRUCTIONS
You have referral to Pharmacist (Ruby) to discuss medications for sugar and weight loss.     Continue Tramadol    You have Referral to Dr. Hay, surgery, for mass on right thigh.     Get Xrays knee done.     Follow up in 3 months.

## 2024-05-06 NOTE — ASSESSMENT & PLAN NOTE
Starting weight was 369. Is down to 261 goal weight is under 250 for knee surgery. She is on Rybelsus 14 mg daily. Will have pharmacy help us to see if can get other agent.

## 2024-05-06 NOTE — PROGRESS NOTES
Subjective   Patient ID: Jacqueline Flores is a 49 y.o. female who presents for Follow-up (3 month follow up and look at lump on right leg. ).    Here for 3 month followup    On Tramadol for chroniic knee pain from arthritis. Unable to get surgery until gets weight down. They told her 250 lbs before surgery. She has to schedule back with knee specialist. She was seeing Dr. Cohn but he is not on her insurance. She has to go through [facility] for knee surgery. She will get doctor. Taking Tramadol 1 po tid. Helps her a lot. Is more functional on it. Due for CSA. Due for drug screen.     HTN - on Benicar-hydrochlorothiazide 20-12.5. She has seen cardiology. She had stress test and they had trouble getting her heart rate back down after stress. They are scheduling heart monitor. Sees Dr. Herring. Still on Metoprolol and took it before stress test.     Prediabetes - on Rybelsus 14. She feels like it is not working as well as it was. More hunger later in the day. Agreeable with consult to Clinical Pharmacist to see if candidate for other meds. Has a lot of boredom eating in the evening. She is on Topamax but is taking it for sleep. Has tried Bupropion in the past which did not help. Not a candidate for phentermine.     Has large lump right leg, she is not sure if it looks bigger because she is losing weight or if it is actually bigger.            Current Outpatient Medications:     aspirin 81 mg chewable tablet, , Disp: , Rfl:     cholecalciferol (Vitamin D-3) 50 mcg (2,000 unit) capsule, Take 1 capsule (50 mcg) by mouth early in the morning.. With food, Disp: , Rfl:     meloxicam (Mobic) 15 mg tablet, Take 1 tablet (15 mg) by mouth once daily., Disp: 90 tablet, Rfl: 3    metoprolol tartrate (Lopressor) 100 mg tablet, Take 1 tablet (100 mg) by mouth in the morning and 1 tablet (100 mg) in the evening., Disp: 180 tablet, Rfl: 3    olmesartan-hydrochlorothiazide (Benicar HCT) 20-12.5 mg tablet, Take 2 tablets by mouth once daily.,  "Disp: 180 tablet, Rfl: 3    pantoprazole (ProtoNix) 40 mg EC tablet, Take 1 tablet (40 mg) by mouth once daily., Disp: 90 tablet, Rfl: 3    semaglutide (Rybelsus) 14 mg tablet tablet, Take 1 tablet (14 mg) by mouth once daily., Disp: 90 tablet, Rfl: 3    topiramate (Topamax) 50 mg tablet, Take 1 tablet (50 mg) by mouth once daily at bedtime., Disp: 90 tablet, Rfl: 3    verapamil ER (Veralan PM) 240 mg 24 hr capsule, Take 1 capsule (240 mg) by mouth once daily at bedtime. Do not crush or chew., Disp: 90 capsule, Rfl: 3    traMADol (Ultram) 50 mg tablet, Take 1 tablet (50 mg) by mouth 3 times a day as needed for severe pain (7 - 10) or moderate pain (4 - 6)., Disp: 270 tablet, Rfl: 0    Patient Active Problem List   Diagnosis    Arthritis of knee    Gastroesophageal reflux disease    Knee pain    Morbid obesity (Multi)    Prediabetes    Vitamin D deficiency    Migraine headache    Primary insomnia    Seasonal allergies    Accelerated hypertension    Mass of soft tissue of right lower extremity         Review of Systems   Constitutional:  Negative for unexpected weight change.   Respiratory:  Negative for cough and shortness of breath.    Cardiovascular:  Negative for chest pain, palpitations and leg swelling.   Skin:  Negative for rash.   Neurological:  Negative for headaches.   Psychiatric/Behavioral:  Negative for confusion.        Objective   /81 (BP Location: Left arm, Patient Position: Sitting)   Pulse 70   Temp 36 °C (96.8 °F)   Ht 1.651 m (5' 5\")   Wt 119 kg (261 lb 9.6 oz)   SpO2 98%   BMI 43.53 kg/m²     Physical Exam  Vitals reviewed.   Constitutional:       Appearance: Normal appearance.   Pulmonary:      Effort: Pulmonary effort is normal.   Skin:     Comments: Approx 6 by 8 cm soft tissue superficial mass right leg. Consistency softer than lipomas usually are, but more solid than cyst.    Neurological:      Mental Status: She is alert.   Psychiatric:         Mood and Affect: Mood normal.       "   Behavior: Behavior normal.     No results found for this or any previous visit (from the past 672 hour(s)).      Assessment/Plan   Problem List Items Addressed This Visit       Arthritis of knee     On Tramadol and  has lost over 100 lb, onlY 10 lb from goal weight for surgery. Has to have done through special program at [facility]. She will contact them but told her that she needs current xrays. Ordered.          Relevant Medications    traMADol (Ultram) 50 mg tablet    Other Relevant Orders    XR knee right 4+ views    Knee pain - Primary     On Chronic tramadol. Not able to take oral NSAIDs.   No side effects. OARRS consistent with prescription history. Continue medication. She is trying to get new knee surgeon. Will check with insurance. CSA done today. UDS today. Recheck in 3 months with lab prior to visit. Needs current knee xrays for this. ordered         Relevant Medications    traMADol (Ultram) 50 mg tablet    Other Relevant Orders    POCT waived urine drug screen manually resulted    Tramadol Confirmation, Urine    Morbid obesity (Multi)     Starting weight was 369. Is down to 261 goal weight is under 250 for knee surgery. She is on Rybelsus 14 mg daily. Will have pharmacy help us to see if can get other agent.          Relevant Orders    Referral to Clinical Pharmacy    Comprehensive Metabolic Panel    Prediabetes     On Rybelsus 14 mg daily. Last A1C 3 months ago was 5.4, has lost lot of weight. She is feeling like it no longer helps her appetite. Will refer to pharmacy to see if she can get another GLP1 such as Zepbound or Mounjaro. Recheck labs in 3 months.          Relevant Orders    Referral to Clinical Pharmacy    Hemoglobin A1C    Comprehensive Metabolic Panel    Accelerated hypertension    Mass of soft tissue of right lower extremity     Feels irregular below surface, though skin smooth. Does not have texture of lipoma. Refer to surgery         Relevant Orders    Referral to General Surgery          Assessment, plans, tests, and follow up discussed with patient and patient verbalized understanding. Jacqueline was given an opportunity to ask questions and  any concerns were addressed including but not limited to referral, xray, follow up. .

## 2024-05-06 NOTE — ASSESSMENT & PLAN NOTE
Feels irregular below surface, though skin smooth. Does not have texture of lipoma. Refer to surgery

## 2024-05-06 NOTE — PROGRESS NOTES
Placed a 14 day monitor on patient today.  Gave patient instructions and patient verbalized understanding

## 2024-05-07 ENCOUNTER — OFFICE VISIT (OUTPATIENT)
Dept: SURGERY | Facility: CLINIC | Age: 49
End: 2024-05-07
Payer: COMMERCIAL

## 2024-05-07 VITALS
SYSTOLIC BLOOD PRESSURE: 111 MMHG | HEIGHT: 65 IN | DIASTOLIC BLOOD PRESSURE: 79 MMHG | HEART RATE: 70 BPM | OXYGEN SATURATION: 98 % | WEIGHT: 262.6 LBS | BODY MASS INDEX: 43.75 KG/M2

## 2024-05-07 DIAGNOSIS — M79.89 MASS OF SOFT TISSUE OF RIGHT LOWER EXTREMITY: ICD-10-CM

## 2024-05-07 PROCEDURE — 3078F DIAST BP <80 MM HG: CPT | Performed by: SURGERY

## 2024-05-07 PROCEDURE — 99203 OFFICE O/P NEW LOW 30 MIN: CPT | Performed by: SURGERY

## 2024-05-07 PROCEDURE — 3074F SYST BP LT 130 MM HG: CPT | Performed by: SURGERY

## 2024-05-07 ASSESSMENT — ENCOUNTER SYMPTOMS
MYALGIAS: 0
NAUSEA: 0
DYSURIA: 0
DIARRHEA: 0
SHORTNESS OF BREATH: 0
FEVER: 0
ABDOMINAL PAIN: 0
POLYPHAGIA: 0
EYE REDNESS: 0
HEMATURIA: 0
VOMITING: 0
HEADACHES: 0
COUGH: 0
WEAKNESS: 0
EYE PAIN: 0
SPEECH DIFFICULTY: 0
CHILLS: 0
CONSTIPATION: 0
FLANK PAIN: 0
CONFUSION: 0
AGITATION: 0
FATIGUE: 0
ARTHRALGIAS: 1
WOUND: 0
FREQUENCY: 0
BRUISES/BLEEDS EASILY: 0

## 2024-05-07 NOTE — PROGRESS NOTES
GENERAL SURGERY OFFICE NOTE    Patient: Jacqueline Flores    Age: 49 y.o.   Gender: female    MRN: [id number]    PCP: Evelin Robbins MD        SUBJECTIVE     Chief Complaint  New Patient Visit (Patient was referred by Dr. Robbins for a right thigh mass. Patient states that she has had the mass for about 5 years. Patient states that she lost 100lbs over the last year. Patient states that the mass has gotten more prominent. )       ANTHONY Phillips is a 49-year-old white female who I am seeing in consultation at the request of her primary care physician for evaluation of a soft tissue mass of the inner right thigh.  Patient states that the mass has been present for about 5 years.  She has lost about 100 pounds over the last year in anticipation of a knee replacement surgery.  As she is losing the weight, the mass appears to be larger or more prominent.  She is not having any associated pain.  Denies trauma to the region.  No drainage.  Denies any history of infection to the region.  The farm of her shorts tend to hit the area of the soft tissue mass which can be irritating.    ROS  Review of Systems   Constitutional:  Negative for chills, fatigue and fever.   HENT:  Negative for congestion, ear pain and hearing loss.    Eyes:  Negative for pain and redness.   Respiratory:  Negative for cough and shortness of breath.    Cardiovascular:  Negative for chest pain.   Gastrointestinal:  Negative for abdominal pain, constipation, diarrhea, nausea and vomiting.   Endocrine: Negative for polyphagia.   Genitourinary:  Negative for dysuria, flank pain, frequency and hematuria.   Musculoskeletal:  Positive for arthralgias. Negative for myalgias.   Skin:  Negative for rash and wound.   Allergic/Immunologic: Negative for immunocompromised state.   Neurological:  Negative for speech difficulty, weakness and headaches.   Hematological:  Does not bruise/bleed easily.   Psychiatric/Behavioral:  Negative for agitation and confusion.            HISTORY     Past Medical History:   Diagnosis Date    Arthritis of knee 04/05/2023    Unable to take NSAIDs and cannot have surgery until loses weight. Unable to increase activity due to poor pain control. Was started on Tramadol to help until loses enough weight to get surgery. CSA and UDS initially done in [place] 11/21/22.   Pain improved on her Tramadol 50 mg enough to function but not lasting long enough. Increased Tramadol to tid as did not last through workday. Tking extr    Benign essential hypertension 04/05/2023    Evelin Robbins  Sep 02, 2022 7:02PM  Stable on meds, BP a little high today. She will get labs and follow up in a month.     Emily King  Mar 11, 2022 2:35PM  Stable. Continue olmesartan-HCTZ, verapamil and metoprolol. Refills provided. Ordered routine blood work for 6 months.     Emily King  Nov 12, 2021 3:51PM  Stable. Continue olmesartan-HCTZ, verapamil and met    Chronic GERD 04/05/2023    Evelin Robbins  Sep 02, 2022 7:02PM  Is on Protonix. Address at follow up next month. No current xs.     Emily King  Mar 11, 2022 2:35PM  Stable. Continue protonix.  Refill provided.     Emily King  Nov 12, 2021 3:51PM  Stable. Continue protonix.  Refill provided.     Emily King  Jun 08, 2021 4:19PM  Stable. Continue protonix.  Refill provided.    Chronic pain of right knee 04/05/2023       seeing ortho now. THey told her cannot have surgery until BMI is down.  She failed Voltaren gel 1 gm 4 times a day and unable to take oral NSAIDs. On Tramadol 50 mg tid for pain control pending weight loss and surgery.     History of stroke 06/09/2014    Morbid obesity (Multi) 04/05/2023    Evelin Robbins  Nov 21, 2022 5:35PM  Will increase dose of Tramadol to help with pain walking. Increasing activity will help her lose weight to get to goal to get knee replaced. Inactivity is counterproductive for her health. She is  tolerating well.    Prediabetes 04/05/2023       Increased Rybelsus to 7 mg. Did not tolerate and decreased to 3 mg again. Last A1C 6.8 but others all less than 6.5.        Primary osteoarthritis of right knee 04/05/2023    Emily King  Mar 11, 2022 2:35PM  Follows with orthopedics.  S/p steroid and gel injections this past summer. Continue meloxicam, tylenol and tramadol.     Christine Emily  Nov 12, 2021 4:13PM  Continues to have pain. Follows with orthopedics.  S/p steroid and gel injections this past summer. Continue meloxicam and tylenol. Provided 16 tabs of tramadol to use BID 2 days per    Vitamin D deficiency 04/05/2023    Evelin Robbins  Sep 02, 2022 7:02PM  Overdue for labs. Follow up one month.     Kt Kingsey  Mar 11, 2022 2:35PM  Will start on daily vitamin D 2000 IU daily and recheck in 6 months.     Christine Emily  Nov 12, 2021 3:51PM  Continue weekly vitamin D as previously prescribed with repeat vitamin D after treatment.        Past Surgical History:   Procedure Laterality Date    OTHER SURGICAL HISTORY  06/08/2021    Tonsillectomy with adenoidectomy        Family History   Problem Relation Name Age of Onset    Leukemia Mother      Cerebral aneurysm Maternal Grandfather      Esophageal cancer Paternal Grandmother      Leukemia Other Niece     Brain cancer Other Aunt         Allergies   Allergen Reactions    Adhesive Tape-Silicones Unknown    Pollens Extract Unknown        Social History     Tobacco Use   Smoking Status Never   Smokeless Tobacco Never        Social History     Substance and Sexual Activity   Alcohol Use Yes    Comment: weekends        HOME MEDICATIONS  Current Outpatient Medications   Medication Instructions    aspirin 81 mg chewable tablet No dose, route, or frequency recorded.    cholecalciferol (VITAMIN D-3) 50 mcg, oral, Daily, With food    meloxicam (MOBIC) 15 mg, oral, Daily    metoprolol tartrate (LOPRESSOR) 100 mg, oral,  "Every 12 hours    olmesartan-hydrochlorothiazide (Benicar HCT) 20-12.5 mg tablet 2 tablets, oral, Daily    pantoprazole (PROTONIX) 40 mg, oral, Daily    semaglutide (RYBELSUS) 14 mg, oral, Daily    topiramate (TOPAMAX) 50 mg, oral, Nightly    traMADol (ULTRAM) 50 mg, oral, 3 times daily PRN    verapamil ER (VERALAN PM) 240 mg, oral, Nightly, Do not crush or chew.          OBJECTIVE   Last Recorded Vitals.  Blood pressure 111/79, pulse 70, height 1.651 m (5' 5\"), weight 119 kg (262 lb 9.6 oz), SpO2 98%.     PHYSICAL EXAM  Physical Exam   General: Well-developed, well-nourished and in no acute distress.  Head: Normocephalic. Atraumatic.  Neck/thyroid: Neck is supple.   Eyes: Pupils equal round and reactive to light. Conjunctiva normal.  ENMT: No masses or deformity of external nose. External ears without masses.  Respiratory/Chest:  Normal respiratory effort.  Musculoskeletal: Joints and limbs are grossly normal. Normal gait.  On the upper inner right thigh, there is a 6 x 6 cm soft tissue mass that protrudes from the skin level for at least 3 cm.  It is mobile compared to the surrounding tissue.  No erythema.  Neuro: Oriented to person, place and time. No obvious neurological deficit. Motor strength grossly normal.  Psych: Normal mood and affect.    RESULTS   Labs  Results for orders placed or performed in visit on 05/06/24 (from the past 24 hour(s))   POCT waived urine drug screen manually resulted   Result Value Ref Range    POC THC Negative Negative, Not applicable ng/mL    POC Cocaine Negative Negative, Not applicable ng/mL    POC Opiates Not applicable Negative, Not applicable ng/mL    POC Amphetamine Negative Negative, Not applicable ng/mL    POC Phencyclidine (PCP) Negative Negative, Not applicable ng/mL    POC Barbiturates Negative Negative, Not applicable ng/mL    POC Benzodiazepines Negative Negative, Not applicable ng/mL    POC Methamphetamine Negative Negative, Not applicable ng/mL    POC METHADONE " MANUALLY ENTERED Negative Negative, Not applicable ng/mL    POC Ticyclic Antidepressants (TCA) Not applicable Negative, Not applicable ng/mL    POC Oxycodone Negative Negative, Not applicable ng/mL    POC MDMA URINE Negative Negative, Not applicable ng/mL    POC Morphine Urine Negative Negative, Not applicable ng/mL    POC Burprenorphine Urine Negative Negative, Not applicable ng/mL       Radiology Resutls  No results found.       ASSESSMENT / PLAN   Diagnoses and all orders for this visit:  Mass of soft tissue of right lower extremity  -     Referral to General Surgery      Plan  1.  Reviewed with the patient that the soft tissue mass is most consistent with a large lipoma.  Unclear how long it has actually been present, but is probably more pronounced now that she has had ongoing weight loss.  We discussed that it is too large to remove in the office under local anesthetic.  Therefore, she would require MAC anesthesia for removal.  Also discussed that given the larger size, that she likely would need a postoperative drain to decrease risk of postoperative seroma formation.  2.  Reviewed the benefits and risk of removal of the soft tissue mass, including the possibility of financial responsibility for the procedure.  Since there has not been previous documentation as to the size of the mass, she has elected to hold off on surgical intervention at this time.  She will return to the office in 6 months to recheck the size of the mass.  3.  If she does decide to proceed with surgical intervention, she will need to hold her meloxicam 1 week prior to surgery.  Of note she is also on chronic Ultram usage.      Sydnie Hay MD, FACS  [facility]  [address] [place] [address];   [address]  [place]  [address] [phone]

## 2024-05-07 NOTE — LETTER
May 9, 2024     Evelin Robbins MD  [address]  [address] [place] [id number]    Patient: Jacqueline Flores   YOB: 1975   Date of Visit: 5/7/2024       Dear Dr. Evelin Robbins MD:    Thank you for referring Jacqueline Flores to me for evaluation. Below are my notes for this consultation.  If you have questions, please do not hesitate to call me. I look forward to following your patient along with you.       Sincerely,     Sydnie Hay MD      CC: No Recipients  ______________________________________________________________________________________        GENERAL SURGERY OFFICE NOTE    Patient: Jacqueline Flores    Age: 49 y.o.   Gender: female    MRN: [id number]    PCP: Evelin Robbins MD        SUBJECTIVE     Chief Complaint  New Patient Visit (Patient was referred by Dr. Robbins for a right thigh mass. Patient states that she has had the mass for about 5 years. Patient states that she lost 100lbs over the last year. Patient states that the mass has gotten more prominent. )       ANTHONY Phillips is a 49-year-old white female who I am seeing in consultation at the request of her primary care physician for evaluation of a soft tissue mass of the inner right thigh.  Patient states that the mass has been present for about 5 years.  She has lost about 100 pounds over the last year in anticipation of a knee replacement surgery.  As she is losing the weight, the mass appears to be larger or more prominent.  She is not having any associated pain.  Denies trauma to the region.  No drainage.  Denies any history of infection to the region.  The farm of her shorts tend to hit the area of the soft tissue mass which can be irritating.    ROS  Review of Systems   Constitutional:  Negative for chills, fatigue and fever.   HENT:  Negative for congestion, ear pain and hearing loss.    Eyes:  Negative for pain and redness.   Respiratory:  Negative for cough and shortness of breath.    Cardiovascular:   Negative for chest pain.   Gastrointestinal:  Negative for abdominal pain, constipation, diarrhea, nausea and vomiting.   Endocrine: Negative for polyphagia.   Genitourinary:  Negative for dysuria, flank pain, frequency and hematuria.   Musculoskeletal:  Positive for arthralgias. Negative for myalgias.   Skin:  Negative for rash and wound.   Allergic/Immunologic: Negative for immunocompromised state.   Neurological:  Negative for speech difficulty, weakness and headaches.   Hematological:  Does not bruise/bleed easily.   Psychiatric/Behavioral:  Negative for agitation and confusion.           HISTORY     Past Medical History:   Diagnosis Date   • Arthritis of knee 04/05/2023    Unable to take NSAIDs and cannot have surgery until loses weight. Unable to increase activity due to poor pain control. Was started on Tramadol to help until loses enough weight to get surgery. CSA and UDS initially done in [facility] 11/21/22.   Pain improved on her Tramadol 50 mg enough to function but not lasting long enough. Increased Tramadol to tid as did not last through workday. Tking extr   • Benign essential hypertension 04/05/2023    Evelin Robbins  Sep 02, 2022 7:02PM  Stable on meds, BP a little high today. She will get labs and follow up in a month.     Emily King  Mar 11, 2022 2:35PM  Stable. Continue olmesartan-HCTZ, verapamil and metoprolol. Refills provided. Ordered routine blood work for 6 months.     Emily King  Nov 12, 2021 3:51PM  Stable. Continue olmesartan-HCTZ, verapamil and met   • Chronic GERD 04/05/2023    Evelin Robbins  Sep 02, 2022 7:02PM  Is on Protonix. Address at follow up next month. No current xs.     Emily King  Mar 11, 2022 2:35PM  Stable. Continue protonix.  Refill provided.     Emily King  Nov 12, 2021 3:51PM  Stable. Continue protonix.  Refill provided.     Emily King  Jun 08, 2021 4:19PM  Stable. Continue protonix.   Refill provided.   • Chronic pain of right knee 04/05/2023       seeing ortho now. THey told her cannot have surgery until BMI is down.  She failed Voltaren gel 1 gm 4 times a day and unable to take oral NSAIDs. On Tramadol 50 mg tid for pain control pending weight loss and surgery.    • History of stroke 06/09/2014   • Morbid obesity (Multi) 04/05/2023    Evelin Robbins  Nov 21, 2022 5:35PM  Will increase dose of Tramadol to help with pain walking. Increasing activity will help her lose weight to get to goal to get knee replaced. Inactivity is counterproductive for her health. She is tolerating well.   • Prediabetes 04/05/2023       Increased Rybelsus to 7 mg. Did not tolerate and decreased to 3 mg again. Last A1C 6.8 but others all less than 6.5.       • Primary osteoarthritis of right knee 04/05/2023    Emily King  Mar 11, 2022 2:35PM  Follows with orthopedics.  S/p steroid and gel injections this past summer. Continue meloxicam, tylenol and tramadol.     Emily King  Nov 12, 2021 4:13PM  Continues to have pain. Follows with orthopedics.  S/p steroid and gel injections this past summer. Continue meloxicam and tylenol. Provided 16 tabs of tramadol to use BID 2 days per   • Vitamin D deficiency 04/05/2023    Evelin Robbins  Sep 02, 2022 7:02PM  Overdue for labs. Follow up one month.     Emily King  Mar 11, 2022 2:35PM  Will start on daily vitamin D 2000 IU daily and recheck in 6 months.     Emily King  Nov 12, 2021 3:51PM  Continue weekly vitamin D as previously prescribed with repeat vitamin D after treatment.        Past Surgical History:   Procedure Laterality Date   • OTHER SURGICAL HISTORY  06/08/2021    Tonsillectomy with adenoidectomy        Family History   Problem Relation Name Age of Onset   • Leukemia Mother     • Cerebral aneurysm Maternal Grandfather     • Esophageal cancer Paternal Grandmother     • Leukemia Other Niece    • Brain  "cancer Other Aunt         Allergies   Allergen Reactions   • Adhesive Tape-Silicones Unknown   • Pollens Extract Unknown        Social History     Tobacco Use   Smoking Status Never   Smokeless Tobacco Never        Social History     Substance and Sexual Activity   Alcohol Use Yes    Comment: weekends        HOME MEDICATIONS  Current Outpatient Medications   Medication Instructions   • aspirin 81 mg chewable tablet No dose, route, or frequency recorded.   • cholecalciferol (VITAMIN D-3) 50 mcg, oral, Daily, With food   • meloxicam (MOBIC) 15 mg, oral, Daily   • metoprolol tartrate (LOPRESSOR) 100 mg, oral, Every 12 hours   • olmesartan-hydrochlorothiazide (Benicar HCT) 20-12.5 mg tablet 2 tablets, oral, Daily   • pantoprazole (PROTONIX) 40 mg, oral, Daily   • semaglutide (RYBELSUS) 14 mg, oral, Daily   • topiramate (TOPAMAX) 50 mg, oral, Nightly   • traMADol (ULTRAM) 50 mg, oral, 3 times daily PRN   • verapamil ER (VERALAN PM) 240 mg, oral, Nightly, Do not crush or chew.          OBJECTIVE   Last Recorded Vitals.  Blood pressure 111/79, pulse 70, height 1.651 m (5' 5\"), weight 119 kg (262 lb 9.6 oz), SpO2 98%.     PHYSICAL EXAM  Physical Exam   General: Well-developed, well-nourished and in no acute distress.  Head: Normocephalic. Atraumatic.  Neck/thyroid: Neck is supple.   Eyes: Pupils equal round and reactive to light. Conjunctiva normal.  ENMT: No masses or deformity of external nose. External ears without masses.  Respiratory/Chest:  Normal respiratory effort.  Musculoskeletal: Joints and limbs are grossly normal. Normal gait.  On the upper inner right thigh, there is a 6 x 6 cm soft tissue mass that protrudes from the skin level for at least 3 cm.  It is mobile compared to the surrounding tissue.  No erythema.  Neuro: Oriented to person, place and time. No obvious neurological deficit. Motor strength grossly normal.  Psych: Normal mood and affect.    RESULTS   Labs  Results for orders placed or performed in " visit on 05/06/24 (from the past 24 hour(s))   POCT waived urine drug screen manually resulted   Result Value Ref Range    POC THC Negative Negative, Not applicable ng/mL    POC Cocaine Negative Negative, Not applicable ng/mL    POC Opiates Not applicable Negative, Not applicable ng/mL    POC Amphetamine Negative Negative, Not applicable ng/mL    POC Phencyclidine (PCP) Negative Negative, Not applicable ng/mL    POC Barbiturates Negative Negative, Not applicable ng/mL    POC Benzodiazepines Negative Negative, Not applicable ng/mL    POC Methamphetamine Negative Negative, Not applicable ng/mL    POC METHADONE MANUALLY ENTERED Negative Negative, Not applicable ng/mL    POC Ticyclic Antidepressants (TCA) Not applicable Negative, Not applicable ng/mL    POC Oxycodone Negative Negative, Not applicable ng/mL    POC MDMA URINE Negative Negative, Not applicable ng/mL    POC Morphine Urine Negative Negative, Not applicable ng/mL    POC Burprenorphine Urine Negative Negative, Not applicable ng/mL       Radiology Resutls  No results found.       ASSESSMENT / PLAN   Diagnoses and all orders for this visit:  Mass of soft tissue of right lower extremity  -     Referral to General Surgery      Plan  1.  Reviewed with the patient that the soft tissue mass is most consistent with a large lipoma.  Unclear how long it has actually been present, but is probably more pronounced now that she has had ongoing weight loss.  We discussed that it is too large to remove in the office under local anesthetic.  Therefore, she would require MAC anesthesia for removal.  Also discussed that given the larger size, that she likely would need a postoperative drain to decrease risk of postoperative seroma formation.  2.  Reviewed the benefits and risk of removal of the soft tissue mass, including the possibility of financial responsibility for the procedure.  Since there has not been previous documentation as to the size of the mass, she has elected to  hold off on surgical intervention at this time.  She will return to the office in 6 months to recheck the size of the mass.  3.  If she does decide to proceed with surgical intervention, she will need to hold her meloxicam 1 week prior to surgery.  Of note she is also on chronic Ultram usage.      Sydnie Hay MD, FACS  [facility] Surgery  [address] [place] [address];   [address]  [place]  [address] [phone]

## 2024-05-07 NOTE — PATIENT INSTRUCTIONS
1.  The soft tissue mass of your inner right thigh is most likely consistent with a lipoma.  This is a fatty tumor.  This is not associated with cancer.  Lipomas are usually removed only if there is rapid growth or associated pain/irritation.  To check for any growth, an appointment in 6 months will be made to measure the soft tissue mass.  If you notice any significant changes in the mass, please call Dr. Hay's office sooner.  [phone]

## 2024-05-09 LAB
NORTRAMADOL UR-MCNC: >1000 NG/ML
TRAMADOL UR CFM-MCNC: >1000 NG/ML

## 2024-05-21 ENCOUNTER — TELEPHONE (OUTPATIENT)
Dept: PRIMARY CARE | Facility: CLINIC | Age: 49
End: 2024-05-21
Payer: COMMERCIAL

## 2024-05-21 DIAGNOSIS — K21.9 CHRONIC GERD: ICD-10-CM

## 2024-05-21 RX ORDER — PANTOPRAZOLE SODIUM 40 MG/1
40 TABLET, DELAYED RELEASE ORAL DAILY
Qty: 90 TABLET | Refills: 1 | Status: SHIPPED | OUTPATIENT
Start: 2024-05-21 | End: 2024-11-17

## 2024-05-21 NOTE — TELEPHONE ENCOUNTER
Jacqueline called for a refill on the following medication:    Pantoprazole 40mg 1 tablet daily     Pharmacy: [facility]    Last seen: 5/6/2024  Future appointment: 8/15/2024    She can wait for Dr. Robbins tomorrow.

## 2024-06-19 NOTE — PROGRESS NOTES
Patient ID: Jacqueline Flores is a 49 y.o. female who presents for Weight Loss.    Referring Provider: Evelin Robbins, *  PCP: Evelin Robbins MD   Last visit with PCP: 5/6/2024 Next visit with PCP: 8/15/2024      Subjective     HPI    Review of Systems      Objective     There were no vitals taken for this visit.   BP Readings from Last 4 Encounters:   05/07/24 111/79   05/06/24 116/81   01/26/24 114/78   12/15/23 108/86      There were no vitals filed for this visit.     Labs  Lab Results   Component Value Date    BILITOT 0.8 04/04/2023    CALCIUM 8.8 11/14/2023    CO2 27 11/14/2023     11/14/2023    CREATININE 0.97 01/24/2024    GLUCOSE 93 11/14/2023    ALKPHOS 51 04/04/2023    K 4.2 11/14/2023    PROT 6.5 04/04/2023     11/14/2023    AST 20 04/04/2023    ALT 11 04/04/2023    BUN 22 11/14/2023    ANIONGAP 11 11/14/2023    ALBUMIN 3.9 04/04/2023    GFRF 84 04/04/2023     Lab Results   Component Value Date    TRIG 135 01/24/2024    CHOL 191 01/24/2024    LDLCALC 118 (H) 01/24/2024    HDL 45.7 01/24/2024     Lab Results   Component Value Date    HGBA1C 5.4 01/24/2024       Current Outpatient Medications   Medication Instructions    aspirin 81 mg chewable tablet No dose, route, or frequency recorded.    cholecalciferol (VITAMIN D-3) 50 mcg, oral, Daily, With food    meloxicam (MOBIC) 15 mg, oral, Daily    metoprolol tartrate (LOPRESSOR) 100 mg, oral, Every 12 hours    olmesartan-hydrochlorothiazide (Benicar HCT) 20-12.5 mg tablet 2 tablets, oral, Daily    pantoprazole (PROTONIX) 40 mg, oral, Daily    semaglutide (RYBELSUS) 14 mg, oral, Daily    topiramate (TOPAMAX) 50 mg, oral, Nightly    traMADol (ULTRAM) 50 mg, oral, 3 times daily PRN    verapamil ER (VERALAN PM) 240 mg, oral, Nightly, Do not crush or chew.         Drug Interactions;  None at time of review    Assessment/Plan   Problem List Items Addressed This Visit       Morbid obesity (Multi)    Prediabetes     Patient and I spoke  today to discuss different medications options to help her with weight loss. After ~1 year on Rybelsus, patient saw a 110-115 pound weight loss. She has since seen a plateau over the past month or so. I discuss with her that weight loss on GLP-1 agents can continue for up to 2 years, though the second year's weight has a tendency to come off slower.     Trulicity, Ozempic, Mounjaro -- Not covered under insurance without diagnosis of Type II Diabetes    Wegovy, Zepbound -- benefit exclusion. Patient is unable to afford the cash price for these at this time.     Discuss that her best option may be to continue Rybelsus and continue to work on diet and lifestyle modifications. Patient is agreeable to plan. We can re-evaluate in the new year based on changes to insurance formularies.           Follow-up: as requested per patient, PCP     Time spent with pt: Total length of time 8 (minutes) of the encounter and more than 50% was spent counseling the patient.    Ruby Garcia, PharmD    Continue all meds under the continuation of care with the referring provider and clinical pharmacy team.

## 2024-06-20 ENCOUNTER — TELEPHONE (OUTPATIENT)
Dept: PRIMARY CARE | Facility: CLINIC | Age: 49
End: 2024-06-20

## 2024-06-20 ENCOUNTER — APPOINTMENT (OUTPATIENT)
Dept: PHARMACY | Facility: HOSPITAL | Age: 49
End: 2024-06-20
Payer: COMMERCIAL

## 2024-06-20 DIAGNOSIS — I10 BENIGN ESSENTIAL HYPERTENSION: ICD-10-CM

## 2024-06-20 DIAGNOSIS — E66.01 MORBID OBESITY (MULTI): ICD-10-CM

## 2024-06-20 DIAGNOSIS — R73.03 PREDIABETES: ICD-10-CM

## 2024-06-20 NOTE — TELEPHONE ENCOUNTER
Rx Refill Request Telephone Encounter    Name:  Jacqueline Flores  :  [id number]  Medication Name:        metoprolol tartrate (Lopressor) 100 mg tablet   Route: Take 1 tablet (100 mg) by mouth in the morning and 1 tablet (100 mg) in the evening.     Specific Pharmacy location:  [address] [place] [address] [address]   Date of last appointment:  24  Date of next appointment:  8/15/24  Best number to reach patient: [phone]

## 2024-06-21 RX ORDER — METOPROLOL TARTRATE 100 MG/1
100 TABLET ORAL EVERY 12 HOURS
Qty: 180 TABLET | Refills: 0 | Status: SHIPPED | OUTPATIENT
Start: 2024-06-21 | End: 2024-09-19

## 2024-06-21 NOTE — ASSESSMENT & PLAN NOTE
Patient and I spoke today to discuss different medications options to help her with weight loss. After ~1 year on Rybelsus, patient saw a 110-115 pound weight loss. She has since seen a plateau over the past month or so. I discuss with her that weight loss on GLP-1 agents can continue for up to 2 years, though the second year's weight has a tendency to come off slower.     Trulicity, Ozempic, Mounjaro -- Not covered under insurance without diagnosis of Type II Diabetes    Wegovy, Zepbound -- benefit exclusion. Patient is unable to afford the cash price for these at this time.     Discuss that her best option may be to continue Rybelsus and continue to work on diet and lifestyle modifications. Patient is agreeable to plan. We can re-evaluate in the new year based on changes to insurance formularies.

## 2024-08-15 ENCOUNTER — APPOINTMENT (OUTPATIENT)
Dept: PRIMARY CARE | Facility: CLINIC | Age: 49
End: 2024-08-15
Payer: COMMERCIAL

## 2024-08-15 VITALS
RESPIRATION RATE: 16 BRPM | SYSTOLIC BLOOD PRESSURE: 118 MMHG | HEIGHT: 65 IN | OXYGEN SATURATION: 99 % | TEMPERATURE: 96.7 F | BODY MASS INDEX: 43.09 KG/M2 | DIASTOLIC BLOOD PRESSURE: 74 MMHG | HEART RATE: 72 BPM | WEIGHT: 258.6 LBS

## 2024-08-15 DIAGNOSIS — E66.01 MORBID OBESITY (MULTI): ICD-10-CM

## 2024-08-15 DIAGNOSIS — I10 BENIGN ESSENTIAL HYPERTENSION: Primary | ICD-10-CM

## 2024-08-15 DIAGNOSIS — M17.10 ARTHRITIS OF KNEE: ICD-10-CM

## 2024-08-15 DIAGNOSIS — R73.03 PREDIABETES: ICD-10-CM

## 2024-08-15 DIAGNOSIS — R25.2 MUSCLE CRAMPS: ICD-10-CM

## 2024-08-15 DIAGNOSIS — E55.9 VITAMIN D DEFICIENCY: ICD-10-CM

## 2024-08-15 PROCEDURE — 3074F SYST BP LT 130 MM HG: CPT | Performed by: FAMILY MEDICINE

## 2024-08-15 PROCEDURE — 3008F BODY MASS INDEX DOCD: CPT | Performed by: FAMILY MEDICINE

## 2024-08-15 PROCEDURE — 99214 OFFICE O/P EST MOD 30 MIN: CPT | Performed by: FAMILY MEDICINE

## 2024-08-15 PROCEDURE — G2211 COMPLEX E/M VISIT ADD ON: HCPCS | Performed by: FAMILY MEDICINE

## 2024-08-15 PROCEDURE — 3078F DIAST BP <80 MM HG: CPT | Performed by: FAMILY MEDICINE

## 2024-08-15 RX ORDER — TRAMADOL HYDROCHLORIDE 50 MG/1
50 TABLET ORAL 3 TIMES DAILY
Qty: 180 TABLET | Refills: 0 | Status: SHIPPED | OUTPATIENT
Start: 2024-08-15 | End: 2024-10-14

## 2024-08-15 RX ORDER — METOPROLOL TARTRATE 100 MG/1
100 TABLET ORAL EVERY 12 HOURS
Qty: 180 TABLET | Refills: 0 | Status: SHIPPED | OUTPATIENT
Start: 2024-08-15 | End: 2024-11-13

## 2024-08-15 RX ORDER — TRAMADOL HYDROCHLORIDE 50 MG/1
50 TABLET ORAL 3 TIMES DAILY
COMMUNITY
End: 2024-08-15 | Stop reason: SDUPTHER

## 2024-08-15 ASSESSMENT — ENCOUNTER SYMPTOMS
CONFUSION: 0
PALPITATIONS: 0
HEADACHES: 0
COUGH: 0
SHORTNESS OF BREATH: 0
UNEXPECTED WEIGHT CHANGE: 0

## 2024-08-15 NOTE — ASSESSMENT & PLAN NOTE
Tramadol improves function and pain. She is awaiting surgery once reaches goal weight.   No evidence of misuse.   Refilled tramadol. OARRS is reassuring

## 2024-08-15 NOTE — PROGRESS NOTES
Subjective   Patient ID: Jacqueline Flores is a 49 y.o. female who presents for Follow-up (Prescription refills Tramadol ).    Her to follow up chronic knee pain. Unable to get surgery until below 250. [facility]. SCSA and drug screen done lat visit.     Prediabetes, obesity - on Rybelsus 14 mg daily. Was to discuss with Clinical Pharmacist. Not candidate for phentermine, Topamax and Buporpion not helpful. Weight down 4 lbs.     HTN - doing well with meds, no side effects. Bps at home running 100/70 range. Takes olmesartan-hydrochlorothiazide 2 daily. Wants to know if could cut it back.     Forgot to get labs. Is on Tramadol. Doing well. Helps her function. She feels legs are much stronger.                Current Outpatient Medications:     aspirin 81 mg chewable tablet, , Disp: , Rfl:     cholecalciferol (Vitamin D-3) 50 mcg (2,000 unit) capsule, Take 1 capsule (50 mcg) by mouth early in the morning.. With food, Disp: , Rfl:     meloxicam (Mobic) 15 mg tablet, Take 1 tablet (15 mg) by mouth once daily., Disp: 90 tablet, Rfl: 3    olmesartan-hydrochlorothiazide (Benicar HCT) 20-12.5 mg tablet, Take 2 tablets by mouth once daily., Disp: 180 tablet, Rfl: 3    pantoprazole (ProtoNix) 40 mg EC tablet, Take 1 tablet (40 mg) by mouth once daily., Disp: 90 tablet, Rfl: 1    semaglutide (Rybelsus) 14 mg tablet tablet, Take 1 tablet (14 mg) by mouth once daily., Disp: 90 tablet, Rfl: 3    topiramate (Topamax) 50 mg tablet, Take 1 tablet (50 mg) by mouth once daily at bedtime., Disp: 90 tablet, Rfl: 3    verapamil ER (Veralan PM) 240 mg 24 hr capsule, Take 1 capsule (240 mg) by mouth once daily at bedtime. Do not crush or chew., Disp: 90 capsule, Rfl: 3    metoprolol tartrate (Lopressor) 100 mg tablet, Take 1 tablet (100 mg) by mouth every 12 hours., Disp: 180 tablet, Rfl: 0    traMADol (Ultram) 50 mg tablet, Take 1 tablet (50 mg) by mouth 3 times a day., Disp: 180 tablet, Rfl: 0    Patient Active Problem List  "  Diagnosis    Arthritis of knee    Gastroesophageal reflux disease    Knee pain    Morbid obesity (Multi)    Prediabetes    Vitamin D deficiency    Migraine headache    Primary insomnia    Seasonal allergies    Accelerated hypertension    Mass of soft tissue of right lower extremity    Benign essential hypertension         Review of Systems   Constitutional:  Negative for unexpected weight change.   Respiratory:  Negative for cough and shortness of breath.    Cardiovascular:  Negative for chest pain, palpitations and leg swelling.   Musculoskeletal:         Muscle cramps both legs   Skin:  Negative for rash.   Neurological:  Negative for headaches.   Psychiatric/Behavioral:  Negative for confusion.        Objective   /74   Pulse 72   Temp 35.9 °C (96.7 °F) (Temporal)   Resp 16   Ht 1.651 m (5' 5\")   Wt 117 kg (258 lb 9.6 oz)   SpO2 99%   BMI 43.03 kg/m²     Physical Exam  Vitals reviewed.   Constitutional:       Appearance: Normal appearance.   Pulmonary:      Effort: Pulmonary effort is normal.   Neurological:      Mental Status: She is alert.   Psychiatric:         Mood and Affect: Mood normal.         Behavior: Behavior normal.             Assessment/Plan   Problem List Items Addressed This Visit       Arthritis of knee     Tramadol improves function and pain. She is awaiting surgery once reaches goal weight.   No evidence of misuse.   Refilled tramadol. OARRS is reassuring         Relevant Medications    traMADol (Ultram) 50 mg tablet    Morbid obesity (Multi)     On Rybelsus 14 and still losing weight. Approaching goal of 250. Continue sexercis within her limits and diet. Labs ordered         Prediabetes     On Rybelsus 14 and making lifestyle change within her limits. She will get lab before follow up in 2 months         Vitamin D deficiency     Recheck level. Is on OTC supplement. Having leg cramps.          Relevant Orders    Vitamin D 25-Hydroxy,Total (for eval of Vitamin D levels)    Benign " essential hypertension - Primary     Bps running low at home 100 systolic. Will take one Benicar hydrochlorothiazide instead of 2         Relevant Medications    metoprolol tartrate (Lopressor) 100 mg tablet    Other Relevant Orders    Lipid Panel     Other Visit Diagnoses       Muscle cramps        Relevant Orders    Magnesium              Assessment, plans, tests, and follow up discussed with patient and patient verbalized understanding. Jacqueline was given an opportunity to ask questions and  any concerns were addressed including but not limited to lab order, meds, follow up.

## 2024-08-15 NOTE — ASSESSMENT & PLAN NOTE
On Rybelsus 14 and still losing weight. Approaching goal of 250. Continue sexercis within her limits and diet. Labs ordered

## 2024-08-16 ENCOUNTER — LAB (OUTPATIENT)
Dept: LAB | Facility: LAB | Age: 49
End: 2024-08-16
Payer: COMMERCIAL

## 2024-08-16 DIAGNOSIS — R25.2 MUSCLE CRAMPS: ICD-10-CM

## 2024-08-16 DIAGNOSIS — E55.9 VITAMIN D DEFICIENCY: ICD-10-CM

## 2024-08-16 DIAGNOSIS — E66.01 MORBID OBESITY (MULTI): ICD-10-CM

## 2024-08-16 DIAGNOSIS — I10 BENIGN ESSENTIAL HYPERTENSION: ICD-10-CM

## 2024-08-16 DIAGNOSIS — R73.03 PREDIABETES: ICD-10-CM

## 2024-08-16 LAB
25(OH)D3 SERPL-MCNC: 37 NG/ML (ref 30–100)
ALBUMIN SERPL BCP-MCNC: 3.8 G/DL (ref 3.4–5)
ALP SERPL-CCNC: 40 U/L (ref 33–110)
ALT SERPL W P-5'-P-CCNC: 9 U/L (ref 7–45)
ANION GAP SERPL CALC-SCNC: 10 MMOL/L (ref 10–20)
AST SERPL W P-5'-P-CCNC: 12 U/L (ref 9–39)
BILIRUB SERPL-MCNC: 1.4 MG/DL (ref 0–1.2)
BUN SERPL-MCNC: 32 MG/DL (ref 6–23)
CALCIUM SERPL-MCNC: 9.2 MG/DL (ref 8.6–10.3)
CHLORIDE SERPL-SCNC: 106 MMOL/L (ref 98–107)
CHOLEST SERPL-MCNC: 191 MG/DL (ref 0–199)
CHOLESTEROL/HDL RATIO: 3.6
CO2 SERPL-SCNC: 29 MMOL/L (ref 21–32)
CREAT SERPL-MCNC: 1.02 MG/DL (ref 0.5–1.05)
EGFRCR SERPLBLD CKD-EPI 2021: 67 ML/MIN/1.73M*2
EST. AVERAGE GLUCOSE BLD GHB EST-MCNC: 114 MG/DL
GLUCOSE SERPL-MCNC: 83 MG/DL (ref 74–99)
HBA1C MFR BLD: 5.6 %
HDLC SERPL-MCNC: 53.7 MG/DL
LDLC SERPL CALC-MCNC: 113 MG/DL
MAGNESIUM SERPL-MCNC: 2.03 MG/DL (ref 1.6–2.4)
NON HDL CHOLESTEROL: 137 MG/DL (ref 0–149)
POTASSIUM SERPL-SCNC: 3.9 MMOL/L (ref 3.5–5.3)
PROT SERPL-MCNC: 6 G/DL (ref 6.4–8.2)
SODIUM SERPL-SCNC: 141 MMOL/L (ref 136–145)
TRIGL SERPL-MCNC: 121 MG/DL (ref 0–149)
VLDL: 24 MG/DL (ref 0–40)

## 2024-08-16 PROCEDURE — 80053 COMPREHEN METABOLIC PANEL: CPT

## 2024-08-16 PROCEDURE — 80061 LIPID PANEL: CPT

## 2024-08-16 PROCEDURE — 36415 COLL VENOUS BLD VENIPUNCTURE: CPT

## 2024-08-16 PROCEDURE — 83735 ASSAY OF MAGNESIUM: CPT

## 2024-08-16 PROCEDURE — 83036 HEMOGLOBIN GLYCOSYLATED A1C: CPT

## 2024-08-16 PROCEDURE — 82306 VITAMIN D 25 HYDROXY: CPT

## 2024-08-16 NOTE — ASSESSMENT & PLAN NOTE
On Rybelsus 14 and making lifestyle change within her limits. She will get lab before follow up in 2 months

## 2024-10-16 ENCOUNTER — APPOINTMENT (OUTPATIENT)
Dept: PRIMARY CARE | Facility: CLINIC | Age: 49
End: 2024-10-16
Payer: COMMERCIAL

## 2024-10-16 VITALS
TEMPERATURE: 96.5 F | BODY MASS INDEX: 43.52 KG/M2 | WEIGHT: 261.2 LBS | HEART RATE: 75 BPM | HEIGHT: 65 IN | RESPIRATION RATE: 16 BRPM | DIASTOLIC BLOOD PRESSURE: 72 MMHG | SYSTOLIC BLOOD PRESSURE: 106 MMHG | OXYGEN SATURATION: 99 %

## 2024-10-16 DIAGNOSIS — I10 BENIGN ESSENTIAL HYPERTENSION: ICD-10-CM

## 2024-10-16 DIAGNOSIS — R73.03 PREDIABETES: ICD-10-CM

## 2024-10-16 DIAGNOSIS — B00.1 RECURRENT COLD SORES: Primary | ICD-10-CM

## 2024-10-16 DIAGNOSIS — K21.9 CHRONIC GERD: ICD-10-CM

## 2024-10-16 DIAGNOSIS — I10 ACCELERATED ESSENTIAL HYPERTENSION: ICD-10-CM

## 2024-10-16 DIAGNOSIS — F51.01 PRIMARY INSOMNIA: ICD-10-CM

## 2024-10-16 DIAGNOSIS — M17.10 ARTHRITIS OF KNEE: ICD-10-CM

## 2024-10-16 DIAGNOSIS — E66.01 MORBID OBESITY (MULTI): ICD-10-CM

## 2024-10-16 PROCEDURE — 3008F BODY MASS INDEX DOCD: CPT | Performed by: FAMILY MEDICINE

## 2024-10-16 PROCEDURE — 99214 OFFICE O/P EST MOD 30 MIN: CPT | Performed by: FAMILY MEDICINE

## 2024-10-16 PROCEDURE — G2211 COMPLEX E/M VISIT ADD ON: HCPCS | Performed by: FAMILY MEDICINE

## 2024-10-16 PROCEDURE — 3074F SYST BP LT 130 MM HG: CPT | Performed by: FAMILY MEDICINE

## 2024-10-16 PROCEDURE — 1036F TOBACCO NON-USER: CPT | Performed by: FAMILY MEDICINE

## 2024-10-16 PROCEDURE — 3078F DIAST BP <80 MM HG: CPT | Performed by: FAMILY MEDICINE

## 2024-10-16 RX ORDER — TRAMADOL HYDROCHLORIDE 50 MG/1
50 TABLET ORAL 3 TIMES DAILY
Qty: 270 TABLET | Refills: 0 | Status: SHIPPED | OUTPATIENT
Start: 2024-10-16 | End: 2025-01-14

## 2024-10-16 RX ORDER — PANTOPRAZOLE SODIUM 40 MG/1
40 TABLET, DELAYED RELEASE ORAL DAILY
Qty: 90 TABLET | Refills: 1 | Status: SHIPPED | OUTPATIENT
Start: 2024-10-16 | End: 2025-04-14

## 2024-10-16 RX ORDER — MELOXICAM 15 MG/1
15 TABLET ORAL DAILY
Qty: 90 TABLET | Refills: 3 | Status: SHIPPED | OUTPATIENT
Start: 2024-10-16 | End: 2025-10-16

## 2024-10-16 RX ORDER — TOPIRAMATE 50 MG/1
50 TABLET, FILM COATED ORAL NIGHTLY
Qty: 90 TABLET | Refills: 3 | Status: SHIPPED | OUTPATIENT
Start: 2024-10-16 | End: 2025-10-16

## 2024-10-16 RX ORDER — OLMESARTAN MEDOXOMIL AND HYDROCHLOROTHIAZIDE 20/12.5 20; 12.5 MG/1; MG/1
1 TABLET ORAL DAILY
Qty: 90 TABLET | Refills: 3 | Status: SHIPPED | OUTPATIENT
Start: 2024-10-16 | End: 2025-10-16

## 2024-10-16 RX ORDER — VALACYCLOVIR HYDROCHLORIDE 1 G/1
2000 TABLET, FILM COATED ORAL 2 TIMES DAILY
Qty: 4 TABLET | Refills: 5 | Status: SHIPPED | OUTPATIENT
Start: 2024-10-16 | End: 2024-10-17

## 2024-10-16 RX ORDER — METOPROLOL TARTRATE 100 MG/1
100 TABLET ORAL EVERY 12 HOURS
Qty: 180 TABLET | Refills: 0 | Status: SHIPPED | OUTPATIENT
Start: 2024-10-16 | End: 2025-01-14

## 2024-10-16 ASSESSMENT — ENCOUNTER SYMPTOMS
CONFUSION: 0
SHORTNESS OF BREATH: 0
HEADACHES: 0
CONSTIPATION: 0
COUGH: 0
DIARRHEA: 0
UNEXPECTED WEIGHT CHANGE: 0
PALPITATIONS: 0

## 2024-10-16 NOTE — ASSESSMENT & PLAN NOTE
On Meloxicam. Using tramadol 3 times a day most days. OARRS consistent. .   Refill for 90 days. Follow up with Shyla Serna in 90 days. No side effects. Tramadol. Markedly improves her ability to function at work and home.

## 2024-10-16 NOTE — PATIENT INSTRUCTIONS
Physical 90 days with Shyla Seran.     Continue Current medications.     Continue to work with Ruby Garcia.     Refilled tramadol for 90 days.

## 2024-10-16 NOTE — PROGRESS NOTES
Subjective   Patient ID: Jacqueline Flores is a 49 y.o. female who presents for Follow-up (2 month follow up and medication refills ).    Here for 2 month follow up.     Prediabetes - on Rybelsus 14 mg daily. Weight up 3 lbs She is not losing any weight and is discouraged. Insurance did not cover Zepbound. Not gaining anything. Struggling to lose.has to be 250 lb to get knee surgery.     BP is still running under 110. Not dizzy or lightheaded. Has Verapamil 200 at home still. Would like to try going back down to 200 mg. Has appt with Dr. Herring.     Osteoarthritis - biggest issues with stopping, pivoting. Tramadol. 2-3 times a day works well to keep her functional. Worse pain when on her feet.     Wants rx for Valtrex for recurrent cold sores.     Due for mammogram    Getting flu shot at work in couple weeks.                        Current Outpatient Medications:     aspirin 81 mg chewable tablet, , Disp: , Rfl:     cholecalciferol (Vitamin D-3) 50 mcg (2,000 unit) capsule, Take 1 capsule (50 mcg) by mouth early in the morning.. With food, Disp: , Rfl:     verapamil ER (Veralan PM) 240 mg 24 hr capsule, Take 1 capsule (240 mg) by mouth once daily at bedtime. Do not crush or chew., Disp: 90 capsule, Rfl: 3    meloxicam (Mobic) 15 mg tablet, Take 1 tablet (15 mg) by mouth once daily., Disp: 90 tablet, Rfl: 3    metoprolol tartrate (Lopressor) 100 mg tablet, Take 1 tablet (100 mg) by mouth every 12 hours., Disp: 180 tablet, Rfl: 0    olmesartan-hydrochlorothiazide (Benicar HCT) 20-12.5 mg tablet, Take 1 tablet by mouth once daily., Disp: 90 tablet, Rfl: 3    pantoprazole (ProtoNix) 40 mg EC tablet, Take 1 tablet (40 mg) by mouth once daily., Disp: 90 tablet, Rfl: 1    semaglutide (Rybelsus) 14 mg tablet tablet, Take 1 tablet (14 mg) by mouth once daily., Disp: 90 tablet, Rfl: 3    topiramate (Topamax) 50 mg tablet, Take 1 tablet (50 mg) by mouth once daily at bedtime., Disp: 90 tablet, Rfl: 3    traMADol (Ultram) 50 mg  "tablet, Take 1 tablet (50 mg) by mouth 3 times a day., Disp: 270 tablet, Rfl: 0    valACYclovir (Valtrex) 1 gram tablet, Take 2 tablets (2,000 mg) by mouth 2 times a day for 1 day., Disp: 4 tablet, Rfl: 5    Patient Active Problem List   Diagnosis    Arthritis of knee    Gastroesophageal reflux disease    Knee pain    Morbid obesity (Multi)    Prediabetes    Vitamin D deficiency    Migraine headache    Primary insomnia    Seasonal allergies    Accelerated hypertension    Mass of soft tissue of right lower extremity    Benign essential hypertension         Review of Systems   Constitutional:  Negative for unexpected weight change.   Respiratory:  Negative for cough and shortness of breath.    Cardiovascular:  Negative for chest pain, palpitations and leg swelling.   Gastrointestinal:  Negative for constipation and diarrhea.   Musculoskeletal:  Positive for gait problem.   Skin:  Negative for rash.   Neurological:  Negative for headaches.   Psychiatric/Behavioral:  Negative for confusion.        Objective   /72 (BP Location: Left arm, Patient Position: Sitting, BP Cuff Size: Large adult long)   Pulse 75   Temp 35.8 °C (96.5 °F) (Temporal)   Resp 16   Ht 1.651 m (5' 5\")   Wt 118 kg (261 lb 3.2 oz)   SpO2 99%   BMI 43.47 kg/m²     Physical Exam    Assessment/Plan   Problem List Items Addressed This Visit       Arthritis of knee     On Meloxicam. Using tramadol 3 times a day most days. OARRS consistent. .   Refill for 90 days. Follow up with Shyla Serna in 90 days. No side effects. Tramadol. Markedly improves her ability to function at work and home.          Relevant Medications    meloxicam (Mobic) 15 mg tablet    traMADol (Ultram) 50 mg tablet    Morbid obesity (Multi)    Relevant Medications    semaglutide (Rybelsus) 14 mg tablet tablet    Prediabetes     On Rybelsus 14 mg daily and frustrated about plateau. She cannot get Zepbound. Will continue to work on diet. Labs and follow up in 3 months.          " Relevant Medications    semaglutide (Rybelsus) 14 mg tablet tablet    Primary insomnia     Melatonin 10 mg not doing a lot. Has muscle cramps at night in leg. Wakes up frequently. Biggest issue is hot flashes.          Relevant Medications    topiramate (Topamax) 50 mg tablet    Benign essential hypertension    Relevant Medications    metoprolol tartrate (Lopressor) 100 mg tablet     Other Visit Diagnoses       Recurrent cold sores    -  Primary    Relevant Medications    valACYclovir (Valtrex) 1 gram tablet    Accelerated essential hypertension        Relevant Medications    olmesartan-hydrochlorothiazide (Benicar HCT) 20-12.5 mg tablet    Chronic GERD        Relevant Medications    pantoprazole (ProtoNix) 40 mg EC tablet              Assessment, plans, tests, and follow up discussed with patient and patient verbalized understanding. Jacqueline was given an opportunity to ask questions and  any concerns were addressed including but not limited to medication, follow up.

## 2024-10-16 NOTE — ASSESSMENT & PLAN NOTE
On Rybelsus 14 mg daily and frustrated about plateau. She cannot get Zepbound. Will continue to work on diet. Labs and follow up in 3 months.

## 2024-10-16 NOTE — ASSESSMENT & PLAN NOTE
Melatonin 10 mg not doing a lot. Has muscle cramps at night in leg. Wakes up frequently. Biggest issue is hot flashes.

## 2024-11-01 ENCOUNTER — APPOINTMENT (OUTPATIENT)
Dept: SURGERY | Facility: CLINIC | Age: 49
End: 2024-11-01
Payer: COMMERCIAL

## 2024-12-12 ENCOUNTER — APPOINTMENT (OUTPATIENT)
Dept: CARDIOLOGY | Facility: CLINIC | Age: 49
End: 2024-12-12
Payer: COMMERCIAL

## 2025-01-16 ENCOUNTER — APPOINTMENT (OUTPATIENT)
Dept: PRIMARY CARE | Facility: CLINIC | Age: 50
End: 2025-01-16
Payer: COMMERCIAL

## 2025-01-16 ENCOUNTER — OFFICE VISIT (OUTPATIENT)
Dept: CARDIOLOGY | Facility: HOSPITAL | Age: 50
End: 2025-01-16
Payer: COMMERCIAL

## 2025-01-16 VITALS
WEIGHT: 255 LBS | DIASTOLIC BLOOD PRESSURE: 80 MMHG | HEART RATE: 68 BPM | HEIGHT: 65 IN | OXYGEN SATURATION: 99 % | BODY MASS INDEX: 42.49 KG/M2 | SYSTOLIC BLOOD PRESSURE: 120 MMHG

## 2025-01-16 DIAGNOSIS — I10 ACCELERATED HYPERTENSION: ICD-10-CM

## 2025-01-16 DIAGNOSIS — I10 ACCELERATED ESSENTIAL HYPERTENSION: ICD-10-CM

## 2025-01-16 LAB
ATRIAL RATE: 68 BPM
P AXIS: 60 DEGREES
P OFFSET: 201 MS
P ONSET: 144 MS
PR INTERVAL: 152 MS
Q ONSET: 220 MS
QRS COUNT: 12 BEATS
QRS DURATION: 90 MS
QT INTERVAL: 396 MS
QTC CALCULATION(BAZETT): 421 MS
QTC FREDERICIA: 413 MS
R AXIS: 9 DEGREES
T AXIS: 66 DEGREES
T OFFSET: 418 MS
VENTRICULAR RATE: 68 BPM

## 2025-01-16 PROCEDURE — 93005 ELECTROCARDIOGRAM TRACING: CPT | Performed by: INTERNAL MEDICINE

## 2025-01-16 PROCEDURE — 3074F SYST BP LT 130 MM HG: CPT | Performed by: INTERNAL MEDICINE

## 2025-01-16 PROCEDURE — 99213 OFFICE O/P EST LOW 20 MIN: CPT | Mod: 25 | Performed by: INTERNAL MEDICINE

## 2025-01-16 PROCEDURE — 99213 OFFICE O/P EST LOW 20 MIN: CPT | Performed by: INTERNAL MEDICINE

## 2025-01-16 PROCEDURE — 3008F BODY MASS INDEX DOCD: CPT | Performed by: INTERNAL MEDICINE

## 2025-01-16 PROCEDURE — 1036F TOBACCO NON-USER: CPT | Performed by: INTERNAL MEDICINE

## 2025-01-16 PROCEDURE — 3079F DIAST BP 80-89 MM HG: CPT | Performed by: INTERNAL MEDICINE

## 2025-01-16 RX ORDER — VERAPAMIL HYDROCHLORIDE 240 MG/1
240 CAPSULE, EXTENDED RELEASE ORAL NIGHTLY
Qty: 90 CAPSULE | Refills: 3 | Status: SHIPPED | OUTPATIENT
Start: 2025-01-16 | End: 2026-01-16

## 2025-01-16 NOTE — PROGRESS NOTES
Counseling:  The patient was counseled regarding diagnostic results, instructions for management, risk factor reductions, prognosis, patient and family education, impressions, risks and benefits of treatment options and importance of compliance with treatment.      Chief Complaint:  The patient presents today for annual followup of HTN.      History Of Present Illness:    Jacqueline Flores is a 49 y.o. female patient who presents today for annual followup of HTN. Her PMH is significant for HTN, prediabetes, h/o TIA approx. 15 years ago, GERD, migraines and insomnia. Over the past year, the patient states that she has done well from a cardiac standpoint. She denies any CP, chest discomfort or SOB. BP has been stable. EKG today shows NSR with no acute changes. The patient is compliant with her prescribed medications.     Past Surgical History:  She has a past surgical history that includes Other surgical history (06/08/2021).      Social History:  She reports that she has never smoked. She has never been exposed to tobacco smoke. She has never used smokeless tobacco. She reports current alcohol use of about 2.0 standard drinks of alcohol per week. She reports that she does not use drugs.    Family History:  Family History   Problem Relation Name Age of Onset    Leukemia Mother      Cerebral aneurysm Maternal Grandfather      Esophageal cancer Paternal Grandmother      Leukemia Other Niece     Brain cancer Other Aunt         Allergies:  Adhesive tape-silicones and Pollens extract    Outpatient Medications:  Current Outpatient Medications   Medication Instructions    aspirin 81 mg chewable tablet No dose, route, or frequency recorded.    cholecalciferol (VITAMIN D-3) 50 mcg, Daily    meloxicam (MOBIC) 15 mg, oral, Daily    metoprolol tartrate (LOPRESSOR) 100 mg, oral, Every 12 hours    olmesartan-hydrochlorothiazide (Benicar HCT) 20-12.5 mg tablet 1 tablet, oral, Daily    pantoprazole (PROTONIX) 40 mg, oral, Daily     "semaglutide (RYBELSUS) 14 mg, oral, Daily    topiramate (TOPAMAX) 50 mg, oral, Nightly    traMADol (ULTRAM) 50 mg, oral, 3 times daily    verapamil ER (VERALAN PM) 240 mg, oral, Nightly, Do not crush or chew.        Last Recorded Vitals:  Vitals:    01/16/25 1600   BP: 120/80   BP Location: Left arm   Patient Position: Sitting   BP Cuff Size: Large adult   Pulse: 68   SpO2: 99%   Weight: 116 kg (255 lb)   Height: 1.651 m (5' 5\")         Review of Systems   All other systems reviewed and are negative.     Physical Exam:  Constitutional:       Appearance: Healthy appearance. Not in distress.   Neck:      Vascular: No JVR. JVD normal.   Pulmonary:      Effort: Pulmonary effort is normal.      Breath sounds: Normal breath sounds. No wheezing. No rhonchi. No rales.   Chest:      Chest wall: Not tender to palpatation.   Cardiovascular:      PMI at left midclavicular line. Normal rate. Regular rhythm. Normal S1. Normal S2.       Murmurs: There is no murmur.      No gallop.  No click. No rub.   Pulses:     Intact distal pulses.   Edema:     Peripheral edema absent.   Abdominal:      General: Bowel sounds are normal.      Palpations: Abdomen is soft.      Tenderness: There is no abdominal tenderness.   Musculoskeletal: Normal range of motion.         General: No tenderness. Skin:     General: Skin is warm and dry.   Neurological:      General: No focal deficit present.      Mental Status: Alert and oriented to person, place and time.            Last Labs:  CBC -  Lab Results   Component Value Date    WBC 14.1 (H) 10/09/2023    HGB 15.3 10/09/2023    HCT 46.6 (H) 10/09/2023    MCV 92 10/09/2023     10/09/2023       CMP -  Lab Results   Component Value Date    CALCIUM 9.2 08/16/2024    PROT 6.0 (L) 08/16/2024    ALBUMIN 3.8 08/16/2024    AST 12 08/16/2024    ALT 9 08/16/2024    ALKPHOS 40 08/16/2024    BILITOT 1.4 (H) 08/16/2024       LIPID PANEL -   Lab Results   Component Value Date    CHOL 191 08/16/2024    TRIG 121 " 08/16/2024    HDL 53.7 08/16/2024    CHHDL 3.6 08/16/2024    LDLF 103 (H) 07/05/2023    VLDL 24 08/16/2024    NHDL 137 08/16/2024       RENAL FUNCTION PANEL -   Lab Results   Component Value Date    GLUCOSE 83 08/16/2024     08/16/2024    K 3.9 08/16/2024     08/16/2024    CO2 29 08/16/2024    ANIONGAP 10 08/16/2024    BUN 32 (H) 08/16/2024    CREATININE 1.02 08/16/2024    CALCIUM 9.2 08/16/2024    ALBUMIN 3.8 08/16/2024        Lab Results   Component Value Date    HGBA1C 5.6 08/16/2024       Last Cardiology Tests:  04/08/2024 - Dobutamine Stress Echo  1. Adequate level of stress achieved.  2. No clinical, echocardiographic or electrocardiographic evidence for ischemia at maximal infusion.    Lab review: I have personally reviewed the laboratory result(s).     Assessment/Plan   1) HTN  15+ year h/o HTN  On metoprolol tartrate 100 mg BID, olmesartan-HCTZ 20-12.5 mg daily, verapamil 240 mg   ED evaluation 10/09/2023 with elevated BP and associated headaches - negative workup  H/O TIA approximately 15 years ago in the setting of migraines  FH positive for HTN in multiple first degree relatives, brain aneurysm in paternal grandfather causing death   Dobutamine stress echo 04/08/2024 negative for ischemia   Serum creatinine, plasma metanephrines, aldosterone/renin, cortisol 01/24/2024 negative   Denies CP, chest discomfort or SOB  BP stable  EKG stable  Continue current medical Rx   F/U 1 year     3) Elevated Lipids  Lipid panel 08/16/2024 with total cholesterol, LDL and triglycerides of 191, 113 and 121 respectively  Advised on aggressive secondary risk factor modification         Scribe Attestation  By signing my name below, ICharo Scribe   attest that this documentation has been prepared under the direction and in the presence of Mario Herring MD.

## 2025-01-16 NOTE — PATIENT INSTRUCTIONS
"Continue all current medications as prescribed. Refills for verapamil have been sent to your pharmacy.   Your \"bad\" cholesterol is currently elevated at 113, and we would like to see this below 100. For this, increase your physical activity and monitor your diet. Watch carbohydrate intake (rice, potatoes, pasta, bread, ice-cream all within moderation); increase greens, veggies, fiber, lean protein (fish, turkey, chicken; baked/boiled/grilled, not fried) and fruit.   Followup with Dr. Herring in 1 year, sooner should any issues or concerns arise before then.     If you have any questions or cardiac concerns, please call our office at [phone].   "

## 2025-01-24 ENCOUNTER — APPOINTMENT (OUTPATIENT)
Dept: PRIMARY CARE | Facility: CLINIC | Age: 50
End: 2025-01-24
Payer: COMMERCIAL

## 2025-01-28 ENCOUNTER — APPOINTMENT (OUTPATIENT)
Dept: PRIMARY CARE | Facility: CLINIC | Age: 50
End: 2025-01-28
Payer: COMMERCIAL

## 2025-01-28 VITALS
OXYGEN SATURATION: 98 % | RESPIRATION RATE: 18 BRPM | SYSTOLIC BLOOD PRESSURE: 131 MMHG | BODY MASS INDEX: 43.95 KG/M2 | DIASTOLIC BLOOD PRESSURE: 88 MMHG | HEART RATE: 79 BPM | HEIGHT: 65 IN | WEIGHT: 263.8 LBS | TEMPERATURE: 97.8 F

## 2025-01-28 DIAGNOSIS — E66.01 MORBID OBESITY (MULTI): ICD-10-CM

## 2025-01-28 DIAGNOSIS — G89.29 CHRONIC RIGHT SHOULDER PAIN: ICD-10-CM

## 2025-01-28 DIAGNOSIS — Z12.31 SCREENING MAMMOGRAM FOR BREAST CANCER: ICD-10-CM

## 2025-01-28 DIAGNOSIS — M17.10 ARTHRITIS OF KNEE: Primary | ICD-10-CM

## 2025-01-28 DIAGNOSIS — M25.511 CHRONIC RIGHT SHOULDER PAIN: ICD-10-CM

## 2025-01-28 DIAGNOSIS — R73.03 PREDIABETES: ICD-10-CM

## 2025-01-28 PROCEDURE — 3075F SYST BP GE 130 - 139MM HG: CPT | Performed by: NURSE PRACTITIONER

## 2025-01-28 PROCEDURE — 99214 OFFICE O/P EST MOD 30 MIN: CPT | Performed by: NURSE PRACTITIONER

## 2025-01-28 PROCEDURE — 3008F BODY MASS INDEX DOCD: CPT | Performed by: NURSE PRACTITIONER

## 2025-01-28 PROCEDURE — 3079F DIAST BP 80-89 MM HG: CPT | Performed by: NURSE PRACTITIONER

## 2025-01-28 RX ORDER — TRAMADOL HYDROCHLORIDE 50 MG/1
50 TABLET ORAL 3 TIMES DAILY
Qty: 270 TABLET | Refills: 0 | Status: SHIPPED | OUTPATIENT
Start: 2025-01-28 | End: 2025-04-28

## 2025-01-28 RX ORDER — TRAMADOL HYDROCHLORIDE 50 MG/1
50 TABLET ORAL 3 TIMES DAILY
COMMUNITY
End: 2025-01-28 | Stop reason: SDUPTHER

## 2025-01-28 ASSESSMENT — ENCOUNTER SYMPTOMS
CONFUSION: 0
FATIGUE: 0
COUGH: 0
HEADACHES: 0
FEVER: 0
NAUSEA: 0
ARTHRALGIAS: 1
VOMITING: 0
RESPIRATORY NEGATIVE: 1
WEAKNESS: 0
NERVOUS/ANXIOUS: 0
ACTIVITY CHANGE: 0
APNEA: 0
PALPITATIONS: 0
CONSTITUTIONAL NEGATIVE: 1
DIZZINESS: 0
SHORTNESS OF BREATH: 0
CHILLS: 0
ABDOMINAL PAIN: 0

## 2025-01-28 ASSESSMENT — PATIENT HEALTH QUESTIONNAIRE - PHQ9
SUM OF ALL RESPONSES TO PHQ9 QUESTIONS 1 AND 2: 0
2. FEELING DOWN, DEPRESSED OR HOPELESS: NOT AT ALL
1. LITTLE INTEREST OR PLEASURE IN DOING THINGS: NOT AT ALL

## 2025-01-28 NOTE — PROGRESS NOTES
Subjective   Patient ID: Jacqueline Flores is a 49 y.o. female who presents for Establish Care, Shoulder Pain (Reports chronic right shoulder pain ), Obesity, and Knee Pain.    Previous patient of Dr. Robbins.  Here to establish care with new PCP.  Complains of right shoulder pain.  Has history of generalized osteoarthritis of bilateral knees.  Patient has upcoming plans for knee replacement however recommendation for weight loss by specialist prior to surgery.  She has been working on her diet and exercise plan.  On tramadol for pain-managing well.  Patient also on tramadol. up-to-date contract and urine drug screen  Morbid obesity: BMI 43.90.  On Rybelsus for prediabetes and weight management.  Patient would like to trial Wegovy for management of weight.  Has seen and consulted with clinical pharmacist and karrie with following back up with them in regards to Wegovy approval    Hypertension: well controlled     Current Outpatient Medications:   ·  aspirin 81 mg chewable tablet, , Disp: , Rfl:   ·  cholecalciferol (Vitamin D-3) 50 mcg (2,000 unit) capsule, Take 1 capsule (50 mcg) by mouth early in the morning.. With food, Disp: , Rfl:   ·  meloxicam (Mobic) 15 mg tablet, Take 1 tablet (15 mg) by mouth once daily., Disp: 90 tablet, Rfl: 3  ·  metoprolol tartrate (Lopressor) 100 mg tablet, Take 1 tablet (100 mg) by mouth every 12 hours., Disp: 180 tablet, Rfl: 0  ·  olmesartan-hydrochlorothiazide (Benicar HCT) 20-12.5 mg tablet, Take 1 tablet by mouth once daily., Disp: 90 tablet, Rfl: 3  ·  pantoprazole (ProtoNix) 40 mg EC tablet, Take 1 tablet (40 mg) by mouth once daily., Disp: 90 tablet, Rfl: 1  ·  semaglutide (Rybelsus) 14 mg tablet tablet, Take 1 tablet (14 mg) by mouth once daily., Disp: 90 tablet, Rfl: 3  ·  topiramate (Topamax) 50 mg tablet, Take 1 tablet (50 mg) by mouth once daily at bedtime., Disp: 90 tablet, Rfl: 3  ·  verapamil ER (Veralan PM) 240 mg 24 hr capsule, Take 1 capsule (240 mg) by mouth once  "daily at bedtime. Do not crush or chew., Disp: 90 capsule, Rfl: 3  ·  traMADol (Ultram) 50 mg tablet, Take 1 tablet (50 mg) by mouth 3 times a day., Disp: 270 tablet, Rfl: 0     Shoulder Pain   Pertinent negatives include no fever.        Review of Systems   Constitutional: Negative.  Negative for activity change, chills, fatigue and fever.   Respiratory: Negative.  Negative for apnea, cough and shortness of breath.    Cardiovascular:  Negative for chest pain and palpitations.   Gastrointestinal:  Negative for abdominal pain, nausea and vomiting.   Musculoskeletal:  Positive for arthralgias.   Neurological:  Negative for dizziness, weakness and headaches.   Psychiatric/Behavioral:  Negative for confusion. The patient is not nervous/anxious.        Objective   /88   Pulse 79   Temp 36.6 °C (97.8 °F) (Temporal)   Resp 18   Ht 1.651 m (5' 5\")   Wt 120 kg (263 lb 12.8 oz)   SpO2 98%   BMI 43.90 kg/m²     Physical Exam  Constitutional:       Appearance: Normal appearance.   HENT:      Head: Normocephalic.   Cardiovascular:      Rate and Rhythm: Normal rate and regular rhythm.      Pulses: Normal pulses.      Heart sounds: Normal heart sounds.   Pulmonary:      Effort: Pulmonary effort is normal. No respiratory distress.      Breath sounds: Normal breath sounds. No wheezing.   Abdominal:      General: Bowel sounds are normal.   Neurological:      General: No focal deficit present.      Mental Status: She is alert and oriented to person, place, and time.   Psychiatric:         Mood and Affect: Mood normal.         Behavior: Behavior normal.         Assessment/Plan   Problem List Items Addressed This Visit             ICD-10-CM    Arthritis of knee - Primary M17.10     Continue follow-up with orthopedic specialist.  Continue tramadol  Urine drug screen and controlled med contract reviewed.  Follow-up with specialist           Relevant Medications    traMADol (Ultram) 50 mg tablet    Other Relevant Orders    " Referral to Physical Therapy    Referral to Orthopaedic Surgery    Morbid obesity (Multi) E66.01     Continue working on diet and exercise         Relevant Orders    Referral to Clinical Pharmacy    Prediabetes R73.03     Referral to clinical pharmacy.  Interested in GLP-1 for weight loss         Relevant Orders    Referral to Clinical Pharmacy    Chronic right shoulder pain M25.511, G89.29     Continue tramadol  Referral to PT and orthopedic surgeon for shoulder.         Relevant Medications    traMADol (Ultram) 50 mg tablet    Other Relevant Orders    Referral to Physical Therapy    Referral to Orthopaedic Surgery     Other Visit Diagnoses         Codes    Screening mammogram for breast cancer     Z12.31    Relevant Orders    BI mammo bilateral screening tomosynthesis

## 2025-01-30 PROBLEM — G89.29 CHRONIC RIGHT SHOULDER PAIN: Status: ACTIVE | Noted: 2025-01-30

## 2025-01-30 PROBLEM — M25.511 CHRONIC RIGHT SHOULDER PAIN: Status: ACTIVE | Noted: 2025-01-30

## 2025-01-30 NOTE — ASSESSMENT & PLAN NOTE
Continue follow-up with orthopedic specialist.  Continue tramadol  Urine drug screen and controlled med contract reviewed.  Follow-up with specialist

## 2025-01-31 ENCOUNTER — TELEPHONE (OUTPATIENT)
Dept: PRIMARY CARE | Facility: CLINIC | Age: 50
End: 2025-01-31
Payer: COMMERCIAL

## 2025-01-31 DIAGNOSIS — B00.1 RECURRENT COLD SORES: ICD-10-CM

## 2025-01-31 RX ORDER — VALACYCLOVIR HYDROCHLORIDE 1 G/1
2000 TABLET, FILM COATED ORAL 2 TIMES DAILY
Qty: 4 TABLET | Refills: 5 | Status: SHIPPED | OUTPATIENT
Start: 2025-01-31

## 2025-01-31 RX ORDER — VALACYCLOVIR HYDROCHLORIDE 1 G/1
2 TABLET, FILM COATED ORAL
COMMUNITY
Start: 2024-10-17 | End: 2025-01-31 | Stop reason: SDUPTHER

## 2025-01-31 NOTE — TELEPHONE ENCOUNTER
Patient is requesting a refill on:    Valacyclovir (Valtrex) 1 gram tablet  Take 2 tablets (2,000 mg) by mouth 2 times a day for 1 day.       Pharmacy: [facility]    Last office visit: 1/28/2025  Next office visit: 4/22/2024

## 2025-02-06 NOTE — PROGRESS NOTES
Clinical Pharmacy Appointment    Patient ID: Jacqueline Flores is a 49 y.o. female who presents for Weight Loss and ASCVD (Hx of CVA).    Pt is here for Follow Up appointment.     Referring Provider: Shyla Serna APRN-CNP  PCP: EUGENE Bo   Last visit with PCP: 1/28/2025   Next visit with PCP: 4/22/2025    Cardiology: Dr. Herring    Subjective     Interval History  N/A    HPI  Weight Loss  Starting Weight: 263 lb  Starting BMI: 43.90 kg/m2    Current Weight: --  Current BMI: --    Comorbid Conditions:  HTN: yes  Elevated   Metoprolol tartrate 100 mg BID  Cholesterol: No     Goal <100 for secondary prevention  CVA (6/2014) -Z86.73      The ASCVD Risk score (Rand DECKER, et al., 2019) failed to calculate for the following reasons:    Risk score cannot be calculated because patient has a medical history suggesting prior/existing ASCVD        Objective   Allergies   Allergen Reactions    Adhesive Tape-Silicones Unknown    Pollens Extract Unknown     Social History     Social History Narrative    Not on file      Medication Review  Current Outpatient Medications   Medication Instructions    aspirin 81 mg chewable tablet No dose, route, or frequency recorded.    cholecalciferol (VITAMIN D-3) 50 mcg, Daily    meloxicam (MOBIC) 15 mg, oral, Daily    metoprolol tartrate (LOPRESSOR) 100 mg, oral, Every 12 hours    olmesartan-hydrochlorothiazide (Benicar HCT) 20-12.5 mg tablet 1 tablet, oral, Daily    pantoprazole (PROTONIX) 40 mg, oral, Daily    semaglutide (RYBELSUS) 14 mg, oral, Daily    topiramate (TOPAMAX) 50 mg, oral, Nightly    traMADol (ULTRAM) 50 mg, oral, 3 times daily    valACYclovir (VALTREX) 2,000 mg, oral, 2 times daily    verapamil ER (VERALAN PM) 240 mg, oral, Nightly, Do not crush or chew.    Wegovy 0.5 mg, subcutaneous, Weekly      Vitals  BP Readings from Last 2 Encounters:   01/28/25 131/88   01/16/25 120/80     BMI Readings from Last 1 Encounters:   01/28/25 43.90 kg/m²      Labs  A1C  Lab  "Results   Component Value Date    HGBA1C 5.6 08/16/2024    HGBA1C 5.4 01/24/2024    HGBA1C 5.7 (A) 07/05/2023     BMP  Lab Results   Component Value Date    CALCIUM 9.2 08/16/2024     08/16/2024    K 3.9 08/16/2024    CO2 29 08/16/2024     08/16/2024    BUN 32 (H) 08/16/2024    CREATININE 1.02 08/16/2024    EGFR 67 08/16/2024     LFTs  Lab Results   Component Value Date    ALT 9 08/16/2024    AST 12 08/16/2024    ALKPHOS 40 08/16/2024    BILITOT 1.4 (H) 08/16/2024     FLP  Lab Results   Component Value Date    TRIG 121 08/16/2024    CHOL 191 08/16/2024    LDLF 103 (H) 07/05/2023    LDLCALC 113 (H) 08/16/2024    HDL 53.7 08/16/2024     Urine Microalbumin  No results found for: \"MICROALBCREA\"    Weight Management  Wt Readings from Last 3 Encounters:   01/28/25 120 kg (263 lb 12.8 oz)   01/16/25 116 kg (255 lb)   10/16/24 118 kg (261 lb 3.2 oz)      There is no height or weight on file to calculate BMI.     Assessment/Plan   Problem List Items Addressed This Visit       Morbid obesity (Multi)     Patient and I meet today to discuss alternative options for weight loss. She is well established on Rybelsus and saw excellent weight loss. We discuss a possible transition to Wegovy given her history of stroke. Zepbound will likely yield better weight loss but is not covered on her plan.     Her goal is to lose 30-40 additional pounds in order to have knee replacement.     Will plan to submit for coverage of Wegovy.          Relevant Medications    semaglutide, weight loss, (Wegovy) 0.5 mg/0.5 mL pen injector    Other Relevant Orders    Referral to Clinical Pharmacy    Prediabetes    Relevant Orders    Referral to Clinical Pharmacy     Other Visit Diagnoses       History of stroke    -  Primary    Relevant Medications    semaglutide, weight loss, (Wegovy) 0.5 mg/0.5 mL pen injector            Clinical Pharmacist follow-up: 2/14 @ 1:40 PM, Telehealth visit    Continue all meds under the continuation of care with " the referring provider and clinical pharmacy team.    Thank you,  Ruby Garcia, PharmD  Clinical Pharmacy Specialist     Verbal consent to manage patient's drug therapy was obtained from the patient. They were informed they may decline to participate or withdraw from participation in pharmacy services at any time.

## 2025-02-07 ENCOUNTER — TELEMEDICINE (OUTPATIENT)
Dept: PHARMACY | Facility: HOSPITAL | Age: 50
End: 2025-02-07
Payer: COMMERCIAL

## 2025-02-07 DIAGNOSIS — Z86.73 HISTORY OF STROKE: Primary | ICD-10-CM

## 2025-02-07 DIAGNOSIS — E66.01 MORBID OBESITY (MULTI): ICD-10-CM

## 2025-02-07 DIAGNOSIS — R73.03 PREDIABETES: ICD-10-CM

## 2025-02-07 RX ORDER — SEMAGLUTIDE 0.5 MG/.5ML
0.5 INJECTION, SOLUTION SUBCUTANEOUS WEEKLY
Qty: 2 ML | Refills: 0 | Status: SHIPPED | OUTPATIENT
Start: 2025-02-07 | End: 2025-03-01

## 2025-02-07 NOTE — ASSESSMENT & PLAN NOTE
Patient and I meet today to discuss alternative options for weight loss. She is well established on Rybelsus and saw excellent weight loss. We discuss a possible transition to Wegovy given her history of stroke. Zepbound will likely yield better weight loss but is not covered on her plan.     Her goal is to lose 30-40 additional pounds in order to have knee replacement.     Will plan to submit for coverage of Wegovy.

## 2025-02-12 NOTE — PROGRESS NOTES
Clinical Pharmacy Appointment    Patient ID: Jacqueline Flores is a 49 y.o. female who presents for Weight Loss.    Pt is here for Follow Up appointment.     Referring Provider: Shyla Serna APRN-CNP  PCP: EUGENE Bo   Last visit with PCP: 1/28/2025   Next visit with PCP: 4/22/2025    Cardiology: Dr. Herring    Subjective     Interval History  Wegovy and Zepbound PA denied    HPI  Weight Loss  Starting Weight: 263 lb  Starting BMI: 43.90 kg/m2    Current Weight: --  Current BMI: --    Comorbid Conditions:  HTN: yes  Elevated   Metoprolol tartrate 100 mg BID  Cholesterol: No     Goal <100 for secondary prevention  CVA (6/2014) -Z86.73      The ASCVD Risk score (Rand DECKER, et al., 2019) failed to calculate for the following reasons:    Risk score cannot be calculated because patient has a medical history suggesting prior/existing ASCVD        Objective   Allergies   Allergen Reactions    Adhesive Tape-Silicones Unknown    Pollens Extract Unknown     Social History     Social History Narrative    Not on file      Medication Review  Current Outpatient Medications   Medication Instructions    aspirin 81 mg chewable tablet No dose, route, or frequency recorded.    cholecalciferol (VITAMIN D-3) 50 mcg, Daily    meloxicam (MOBIC) 15 mg, oral, Daily    metoprolol tartrate (LOPRESSOR) 100 mg, oral, Every 12 hours    olmesartan-hydrochlorothiazide (Benicar HCT) 20-12.5 mg tablet 1 tablet, oral, Daily    pantoprazole (PROTONIX) 40 mg, oral, Daily    semaglutide (RYBELSUS) 14 mg, oral, Daily    topiramate (TOPAMAX) 50 mg, oral, Nightly    traMADol (ULTRAM) 50 mg, oral, 3 times daily    valACYclovir (VALTREX) 2,000 mg, oral, 2 times daily    verapamil ER (VERALAN PM) 240 mg, oral, Nightly, Do not crush or chew.      Vitals  BP Readings from Last 2 Encounters:   01/28/25 131/88   01/16/25 120/80     BMI Readings from Last 1 Encounters:   01/28/25 43.90 kg/m²      Labs  A1C  Lab Results   Component Value Date     "HGBA1C 5.6 08/16/2024    HGBA1C 5.4 01/24/2024    HGBA1C 5.7 (A) 07/05/2023     BMP  Lab Results   Component Value Date    CALCIUM 9.2 08/16/2024     08/16/2024    K 3.9 08/16/2024    CO2 29 08/16/2024     08/16/2024    BUN 32 (H) 08/16/2024    CREATININE 1.02 08/16/2024    EGFR 67 08/16/2024     LFTs  Lab Results   Component Value Date    ALT 9 08/16/2024    AST 12 08/16/2024    ALKPHOS 40 08/16/2024    BILITOT 1.4 (H) 08/16/2024     FLP  Lab Results   Component Value Date    TRIG 121 08/16/2024    CHOL 191 08/16/2024    LDLF 103 (H) 07/05/2023    LDLCALC 113 (H) 08/16/2024    HDL 53.7 08/16/2024     Urine Microalbumin  No results found for: \"MICROALBCREA\"    Weight Management  Wt Readings from Last 3 Encounters:   01/28/25 120 kg (263 lb 12.8 oz)   01/16/25 116 kg (255 lb)   10/16/24 118 kg (261 lb 3.2 oz)      There is no height or weight on file to calculate BMI.     Assessment/Plan   Problem List Items Addressed This Visit       Morbid obesity (Multi)    Prediabetes     Patient was referred for initiation of a GLP-1 agonist for weight loss.  Discussed options available including Wegovy, Saxenda, and Zepbound.  Patient's insurance does not cover weight loss injections.  At this time patient would not like to start any injectable due to cost of medication.  Answered all patient's questions and concerns.        Clinical Pharmacist follow-up: 2/14 @ 1:40 PM, Telehealth visit    Continue all meds under the continuation of care with the referring provider and clinical pharmacy team.    Thank you,  Ruby Garcia, PharmD  Clinical Pharmacy Specialist     Verbal consent to manage patient's drug therapy was obtained from the patient. They were informed they may decline to participate or withdraw from participation in pharmacy services at any time.  "

## 2025-02-14 ENCOUNTER — APPOINTMENT (OUTPATIENT)
Dept: PHARMACY | Facility: HOSPITAL | Age: 50
End: 2025-02-14
Payer: COMMERCIAL

## 2025-02-14 ENCOUNTER — HOSPITAL ENCOUNTER (OUTPATIENT)
Dept: RADIOLOGY | Facility: HOSPITAL | Age: 50
Discharge: HOME | End: 2025-02-14
Payer: COMMERCIAL

## 2025-02-14 VITALS — BODY MASS INDEX: 43.82 KG/M2 | WEIGHT: 263 LBS | HEIGHT: 65 IN

## 2025-02-14 DIAGNOSIS — E66.01 MORBID OBESITY (MULTI): ICD-10-CM

## 2025-02-14 DIAGNOSIS — R73.03 PREDIABETES: ICD-10-CM

## 2025-02-14 DIAGNOSIS — Z12.31 SCREENING MAMMOGRAM FOR BREAST CANCER: ICD-10-CM

## 2025-02-14 PROCEDURE — 77067 SCR MAMMO BI INCL CAD: CPT

## 2025-02-14 PROCEDURE — 77063 BREAST TOMOSYNTHESIS BI: CPT

## 2025-03-17 ENCOUNTER — HOSPITAL ENCOUNTER (OUTPATIENT)
Dept: RADIOLOGY | Facility: CLINIC | Age: 50
Discharge: HOME | End: 2025-03-17
Payer: COMMERCIAL

## 2025-03-17 ENCOUNTER — APPOINTMENT (OUTPATIENT)
Dept: ORTHOPEDIC SURGERY | Facility: CLINIC | Age: 50
End: 2025-03-17
Payer: COMMERCIAL

## 2025-03-17 VITALS — WEIGHT: 263 LBS | BODY MASS INDEX: 43.82 KG/M2 | HEIGHT: 65 IN

## 2025-03-17 DIAGNOSIS — M17.10 ARTHRITIS OF KNEE: ICD-10-CM

## 2025-03-17 DIAGNOSIS — M25.511 RIGHT SHOULDER PAIN, UNSPECIFIED CHRONICITY: ICD-10-CM

## 2025-03-17 DIAGNOSIS — M25.511 CHRONIC RIGHT SHOULDER PAIN: ICD-10-CM

## 2025-03-17 DIAGNOSIS — G89.29 CHRONIC RIGHT SHOULDER PAIN: ICD-10-CM

## 2025-03-17 DIAGNOSIS — M19.011 OSTEOARTHRITIS OF GLENOHUMERAL JOINT, RIGHT: Primary | ICD-10-CM

## 2025-03-17 PROCEDURE — 99204 OFFICE O/P NEW MOD 45 MIN: CPT | Performed by: STUDENT IN AN ORGANIZED HEALTH CARE EDUCATION/TRAINING PROGRAM

## 2025-03-17 PROCEDURE — 73030 X-RAY EXAM OF SHOULDER: CPT | Mod: RIGHT SIDE | Performed by: RADIOLOGY

## 2025-03-17 PROCEDURE — 1036F TOBACCO NON-USER: CPT | Performed by: STUDENT IN AN ORGANIZED HEALTH CARE EDUCATION/TRAINING PROGRAM

## 2025-03-17 PROCEDURE — 73030 X-RAY EXAM OF SHOULDER: CPT | Mod: RT

## 2025-03-17 PROCEDURE — 3008F BODY MASS INDEX DOCD: CPT | Performed by: STUDENT IN AN ORGANIZED HEALTH CARE EDUCATION/TRAINING PROGRAM

## 2025-03-17 NOTE — PROGRESS NOTES
PRIMARY CARE PHYSICIAN: EUGENE Bo  REFERRING PROVIDER: EUGENE Bo  [address] [place] [address]     CONSULT ORTHOPAEDIC: Shoulder Evaluation    ASSESSMENT & PLAN    Impression: 49 y.o. female with advanced right shoulder glenohumeral osteoarthritis    Plan:   I explained to the patient the nature of their diagnosis.  I reviewed their imaging studies with them.    Based on the history, physical exam and imaging studies above, the patient's presentation is consistent with the above diagnosis.  I had a long discussion with the patient regarding their presentation and the treatment options.  We discussed initial nonoperative versus operative management options as well as potential further diagnostic imaging.  I reviewed the patient's x-ray findings with her.  We discussed her treatment options going forward.  She has advanced osteoarthritis of the glenohumeral joint on the right.  We discussed initial nonoperative versus operative intervention.  She states that she would like to have a discussion regarding shoulder arthroplasty which I believe is reasonable.  I provided her with a referral to my shoulder arthroplasty specialist colleague.  She will return to see me as needed.  In the meantime she will work on some gentle stretching and progressive strengthening to maintain as much motion and strength as she can.  She will ice and rest the shoulder and take anti-inflammatories as needed.    Follow-Up: Patient will follow-up as needed    At the end of the visit, all questions were answered in full. The patient is in agreement with the plan and recommendations. They will call the office with any questions/concerns.    Note dictated with [employer] software. Completed without full typed error editing and sent to avoid delay.       SUBJECTIVE  CHIEF COMPLAINT: Right Shoulder Pain      HPI: Jacqueline Flores is a 49 y.o. female who presents today with right shoulder  pain. XR Done today. Patient has had ongoing pain for several years. Patient would like to discuss treatment options. No known injury. Patient has previously done PT for the Shoulder which was not helpful. Pain progressing unable to lift the arm.    They deny any associated neck pain.  No numbness, tingling, or paresthesias.    REVIEW OF SYSTEMS  Constitutional: See HPI for pain assessment, No significant weight loss, recent trauma  Cardiovascular: No chest pain, shortness of breath  Respiratory: No difficulty breathing, cough  Gastrointestinal: No nausea, vomiting, diarrhea, constipation  Musculoskeletal: Noted in HPI, positive for pain, restricted motion, stiffness  Integumentary: No rashes, easy bruising, redness   Neurological: no numbness or tingling in extremities, no gait disturbances   Psychiatric: No mood changes, memory changes, social issues  Heme/Lymph: no excessive swelling, easy bruising, excessive bleeding  ENT: No hearing changes  Eyes: No vision changes    Past Medical History:   Diagnosis Date    Arthritis of knee 04/05/2023    Unable to take NSAIDs and cannot have surgery until loses weight. Unable to increase activity due to poor pain control. Was started on Tramadol to help until loses enough weight to get surgery. CSA and UDS initially done in [facility] 11/21/22.   Pain improved on her Tramadol 50 mg enough to function but not lasting long enough. Increased Tramadol to tid as did not last through workday. Tking extr    Benign essential hypertension 04/05/2023    Evelin Robbins  Sep 02, 2022 7:02PM  Stable on meds, BP a little high today. She will get labs and follow up in a month.     Emily King  Mar 11, 2022 2:35PM  Stable. Continue olmesartan-HCTZ, verapamil and metoprolol. Refills provided. Ordered routine blood work for 6 months.     Emily King  Nov 12, 2021 3:51PM  Stable. Continue olmesartan-HCTZ, verapamil and met    Chronic GERD 04/05/2023     Rosendo Evelin  Sep 02, 2022 7:02PM  Is on Protonix. Address at follow up next month. No current xs.     [id number]-Tpumszl, Emily  Mar 11, 2022 2:35PM  Stable. Continue protonix.  Refill provided.     Christine Emily  Nov 12, 2021 3:51PM  Stable. Continue protonix.  Refill provided.     [id number]-Epqxeuz, Emily  Jun 08, 2021 4:19PM  Stable. Continue protonix.  Refill provided.    Chronic pain of right knee 04/05/2023       seeing ortho now. THey told her cannot have surgery until BMI is down.  She failed Voltaren gel 1 gm 4 times a day and unable to take oral NSAIDs. On Tramadol 50 mg tid for pain control pending weight loss and surgery.     History of stroke 06/09/2014    Morbid obesity (Multi) 04/05/2023    Evelin Robbins  Nov 21, 2022 5:35PM  Will increase dose of Tramadol to help with pain walking. Increasing activity will help her lose weight to get to goal to get knee replaced. Inactivity is counterproductive for her health. She is tolerating well.    Prediabetes 04/05/2023       Increased Rybelsus to 7 mg. Did not tolerate and decreased to 3 mg again. Last A1C 6.8 but others all less than 6.5.        Primary osteoarthritis of right knee 04/05/2023    [id number]-[id number], Kelsey  Mar 11, 2022 2:35PM  Follows with orthopedics.  S/p steroid and gel injections this past summer. Continue meloxicam, tylenol and tramadol.     [id number]-Okrvroe, Kelsey  Nov 12, 2021 4:13PM  Continues to have pain. Follows with orthopedics.  S/p steroid and gel injections this past summer. Continue meloxicam and tylenol. Provided 16 tabs of tramadol to use BID 2 days per    Vitamin D deficiency 04/05/2023    Evelin Robbins  Sep 02, 2022 7:02PM  Overdue for labs. Follow up one month.     Emily King  Mar 11, 2022 2:35PM  Will start on daily vitamin D 2000 IU daily and recheck in 6 months.     Emily King  Nov 12, 2021 3:51PM  Continue weekly vitamin D as previously prescribed with  repeat vitamin D after treatment.        Allergies   Allergen Reactions    Adhesive Tape-Silicones Unknown    Pollens Extract Unknown        Past Surgical History:   Procedure Laterality Date    OTHER SURGICAL HISTORY  06/08/2021    Tonsillectomy with adenoidectomy        Family History   Problem Relation Name Age of Onset    Leukemia Mother      Cerebral aneurysm Maternal Grandfather      Esophageal cancer Paternal Grandmother      Leukemia Other Niece     Brain cancer Other Aunt         Social History     Socioeconomic History    Marital status:      Spouse name: Not on file    Number of children: Not on file    Years of education: Not on file    Highest education level: Not on file   Occupational History    Not on file   Tobacco Use    Smoking status: Never     Passive exposure: Never    Smokeless tobacco: Never   Vaping Use    Vaping status: Never Used   Substance and Sexual Activity    Alcohol use: Yes     Alcohol/week: 2.0 standard drinks of alcohol     Types: 2 Shots of liquor per week     Comment: weekends    Drug use: Never    Sexual activity: Yes     Partners: Male   Other Topics Concern    Not on file   Social History Narrative    Not on file     Social Drivers of Health     Financial Resource Strain: Not on file   Food Insecurity: Not on file   Transportation Needs: Not on file   Physical Activity: Not on file   Stress: Not on file   Social Connections: Not on file   Intimate Partner Violence: Not on file   Housing Stability: Not on file        CURRENT MEDICATIONS:   Current Outpatient Medications   Medication Sig Dispense Refill    aspirin 81 mg chewable tablet       cholecalciferol (Vitamin D-3) 50 mcg (2,000 unit) capsule Take 1 capsule (50 mcg) by mouth early in the morning.. With food      meloxicam (Mobic) 15 mg tablet Take 1 tablet (15 mg) by mouth once daily. 90 tablet 3    metoprolol tartrate (Lopressor) 100 mg tablet Take 1 tablet (100 mg) by mouth every 12 hours. 180 tablet 0     "olmesartan-hydrochlorothiazide (Benicar HCT) 20-12.5 mg tablet Take 1 tablet by mouth once daily. 90 tablet 3    pantoprazole (ProtoNix) 40 mg EC tablet Take 1 tablet (40 mg) by mouth once daily. 90 tablet 1    semaglutide (Rybelsus) 14 mg tablet tablet Take 1 tablet (14 mg) by mouth once daily. 90 tablet 3    topiramate (Topamax) 50 mg tablet Take 1 tablet (50 mg) by mouth once daily at bedtime. 90 tablet 3    traMADol (Ultram) 50 mg tablet Take 1 tablet (50 mg) by mouth 3 times a day. 270 tablet 0    valACYclovir (Valtrex) 1 gram tablet Take 2 tablets (2,000 mg) by mouth 2 times a day. 4 tablet 5    verapamil ER (Veralan PM) 240 mg 24 hr capsule Take 1 capsule (240 mg) by mouth once daily at bedtime. Do not crush or chew. 90 capsule 3     No current facility-administered medications for this visit.        OBJECTIVE    PHYSICAL EXAM      5/7/2024    11:45 AM 8/15/2024     4:41 PM 8/15/2024     5:11 PM 10/16/2024     4:55 PM 1/16/2025     4:00 PM 1/28/2025     1:54 PM 2/14/2025     2:35 PM   Vitals   Systolic 111 136 118 106 120 131    Diastolic 79 90 74 72 80 88    BP Location  Right arm  Left arm Left arm     Heart Rate 70 72  75 68 79    Temp  35.9 °C (96.7 °F)  35.8 °C (96.5 °F)  36.6 °C (97.8 °F)    Resp  16  16  18    Height 1.651 m (5' 5\") 1.651 m (5' 5\")  1.651 m (5' 5\") 1.651 m (5' 5\") 1.651 m (5' 5\") 1.651 m (5' 5\")   Weight (lb) 262.6 258.6  261.2 255 263.8 263   BMI 43.7 kg/m2 43.03 kg/m2  43.47 kg/m2 42.43 kg/m2 43.9 kg/m2 43.77 kg/m2   BSA (m2) 2.34 m2 2.32 m2  2.33 m2 2.31 m2 2.35 m2 2.34 m2   Visit Report Report Report Report Report Report Report       There is no height or weight on file to calculate BMI.    GENERAL: A/Ox3, NAD. Appears healthy, well nourished  PSYCHIATRIC: Mood stable, appropriate memory recall  EYES: EOM intact, no scleral icterus  CARDIOVASCULAR: Palpable peripheral pulses  LUNGS: Breathing non-labored on room air  SKIN: no erythema, rashes, or ecchymosis "     MUSCULOSKELETAL:  Laterality: right Shoulder Exam  - Negative Spurlings, full painless neck ROM  - Skin intact  - No erythema or warmth  - No ecchymosis or soft tissue swelling  - Shoulder ROM: Forward flexion 110, ER 10, IR sacrum  - Strength:      Jobes 5-/5     ER 5-/5     Belly press and bearhug 5-/5  - Palpation: Positive tenderness biceps groove and posterolateral acromion  - De La Fuente and Neers positive  - Speeds and O'Briens positive    NEUROVASCULAR:  - Neurovascular Status: sensation intact to light touch distally, upper extremity motor grossly intact  - Capillary refill brisk at extremities, Bilateral peripheral pulses 2+    Imaging: Multiple views of the affected right shoulder(s) demonstrate: Advanced right shoulder glenohumeral osteoarthritis.   X-rays were personally reviewed and interpreted by me.  Radiology reports were reviewed by me as well, if readily available at the time.      Agustín Mejia MD  Attending Surgeon    Sports Medicine Orthopaedic Surgery  [facility] [place]  [facility] [employer]

## 2025-03-28 ENCOUNTER — OFFICE VISIT (OUTPATIENT)
Dept: ORTHOPEDIC SURGERY | Facility: HOSPITAL | Age: 50
End: 2025-03-28
Payer: COMMERCIAL

## 2025-03-28 DIAGNOSIS — M25.511 RIGHT SHOULDER PAIN, UNSPECIFIED CHRONICITY: ICD-10-CM

## 2025-03-28 PROCEDURE — 99213 OFFICE O/P EST LOW 20 MIN: CPT | Performed by: STUDENT IN AN ORGANIZED HEALTH CARE EDUCATION/TRAINING PROGRAM

## 2025-03-28 NOTE — PROGRESS NOTES
PRIMARY CARE PHYSICIAN: MARCOS Bo-CNP  REFERRING PROVIDER: No referring provider defined for this encounter.     CONSULT ORTHOPAEDIC: Shoulder Evaluation    ASSESSMENT & PLAN    Impression/Plan: 49-year-old female right-hand-dominant with end-stage right shoulder glenohumeral osteoarthritis.  She has done physical therapy in the past and is at a point that she is having nightly pain.  She is additionally having pain with her activities of daily living.  Her rotator cuff appears intact on exam but is having pain related weakness and loss of motion.  We had a lengthy discussion regarding operative and nonoperative treatment.  She is at a point where she is pretty miserable and would like to proceed with operative management in the form of anatomic total shoulder arthroplasty.  We discussed the risk, benefits, and alternatives of operative management.  Risks include but are not limited to risk of anesthesia, bleeding, blood loss, infection, damage to surrounding structures.  We discussed that given her BMI over 40, she may be at high risk. She did lose  120 pounds over the last year.  We also discussed that given her young age, she may likely require revision surgery down the line or even conversion to reverse total shoulder arthroplasty if her cuff begins to breakdown. We will obtain a preoperative CT scan.     SUBJECTIVE  CHIEF COMPLAINT:   Chief Complaint   Patient presents with    Right Shoulder - Pain        HPI: Jacqueline Flores is a 49 y.o. patient. 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  She states that in the past, she has done some physical therapy to strengthen and condition her right shoulder.  She states that the pain and weakness has gotten quite bad to the point that she is waking up at night with pain.  She has not had any prior surgery or injection to the shoulder.  She is additionally having  bilateral knee pain and is working on getting her BMI below 40 for total knee arthroplasty.  She has lost 120 pounds over the past year with dietary modifications and GLP-1 agents.    REVIEW OF SYSTEMS  Constitutional: See HPI for pain assessment, No significant weight loss, recent trauma  Cardiovascular: No chest pain, shortness of breath  Respiratory: No difficulty breathing, cough  Gastrointestinal: No nausea, vomiting, diarrhea, constipation  Musculoskeletal: Noted in HPI, positive for pain, restricted motion, stiffness  Integumentary: No rashes, easy bruising, redness   Neurological: no numbness or tingling in extremities, no gait disturbances   Psychiatric: No mood changes, memory changes, social issues  Heme/Lymph: no excessive swelling, easy bruising, excessive bleeding  ENT: No hearing changes  Eyes: No vision changes    Past Medical History:   Diagnosis Date    Arthritis of knee 04/05/2023    Unable to take NSAIDs and cannot have surgery until loses weight. Unable to increase activity due to poor pain control. Was started on Tramadol to help until loses enough weight to get surgery. CSA and UDS initially done in [facility] 11/21/22.   Pain improved on her Tramadol 50 mg enough to function but not lasting long enough. Increased Tramadol to tid as did not last through workday. Tking extr    Benign essential hypertension 04/05/2023    Evelin Robbins  Sep 02, 2022 7:02PM  Stable on meds, BP a little high today. She will get labs and follow up in a month.     Emily King  Mar 11, 2022 2:35PM  Stable. Continue olmesartan-HCTZ, verapamil and metoprolol. Refills provided. Ordered routine blood work for 6 months.     Emily King  Nov 12, 2021 3:51PM  Stable. Continue olmesartan-HCTZ, verapamil and met    Chronic GERD 04/05/2023    Evelin Robbins  Sep 02, 2022 7:02PM  Is on Protonix. Address at follow up next month. No current xs.     Emily King  Mar 11, 2022  2:35PM  Stable. Continue protonix.  Refill provided.     Christine Emily  Nov 12, 2021 3:51PM  Stable. Continue protonix.  Refill provided.     Kt Kingsey  Jun 08, 2021 4:19PM  Stable. Continue protonix.  Refill provided.    Chronic pain of right knee 04/05/2023       seeing ortho now. THey told her cannot have surgery until BMI is down.  She failed Voltaren gel 1 gm 4 times a day and unable to take oral NSAIDs. On Tramadol 50 mg tid for pain control pending weight loss and surgery.     History of stroke 06/09/2014    Morbid obesity (Multi) 04/05/2023    Evelin Robbins  Nov 21, 2022 5:35PM  Will increase dose of Tramadol to help with pain walking. Increasing activity will help her lose weight to get to goal to get knee replaced. Inactivity is counterproductive for her health. She is tolerating well.    Prediabetes 04/05/2023       Increased Rybelsus to 7 mg. Did not tolerate and decreased to 3 mg again. Last A1C 6.8 but others all less than 6.5.        Primary osteoarthritis of right knee 04/05/2023    xd-[id number]-[id number], Emily  Mar 11, 2022 2:35PM  Follows with orthopedics.  S/p steroid and gel injections this past summer. Continue meloxicam, tylenol and tramadol.     qw-ZOTOWR-Hreeqln, Kelsey  Nov 12, 2021 4:13PM  Continues to have pain. Follows with orthopedics.  S/p steroid and gel injections this past summer. Continue meloxicam and tylenol. Provided 16 tabs of tramadol to use BID 2 days per    Vitamin D deficiency 04/05/2023    Evelin Robbins  Sep 02, 2022 7:02PM  Overdue for labs. Follow up one month.     gs-WXHGEY-Cdjsltl, Kelsey  Mar 11, 2022 2:35PM  Will start on daily vitamin D 2000 IU daily and recheck in 6 months.     ea-QRVSCY-Pkynujp, Kelsey  Nov 12, 2021 3:51PM  Continue weekly vitamin D as previously prescribed with repeat vitamin D after treatment.        Allergies   Allergen Reactions    Adhesive Tape-Silicones Unknown    Pollens Extract Unknown        Past  Surgical History:   Procedure Laterality Date    OTHER SURGICAL HISTORY  06/08/2021    Tonsillectomy with adenoidectomy        Family History   Problem Relation Name Age of Onset    Leukemia Mother      Cerebral aneurysm Maternal Grandfather      Esophageal cancer Paternal Grandmother      Leukemia Other Niece     Brain cancer Other Aunt         Social History     Socioeconomic History    Marital status:      Spouse name: Not on file    Number of children: Not on file    Years of education: Not on file    Highest education level: Not on file   Occupational History    Not on file   Tobacco Use    Smoking status: Never     Passive exposure: Never    Smokeless tobacco: Never   Vaping Use    Vaping status: Never Used   Substance and Sexual Activity    Alcohol use: Yes     Alcohol/week: 2.0 standard drinks of alcohol     Types: 2 Shots of liquor per week     Comment: weekends    Drug use: Never    Sexual activity: Yes     Partners: Male   Other Topics Concern    Not on file   Social History Narrative    Not on file     Social Drivers of Health     Financial Resource Strain: Not on file   Food Insecurity: Not on file   Transportation Needs: Not on file   Physical Activity: Not on file   Stress: Not on file   Social Connections: Not on file   Intimate Partner Violence: Not on file   Housing Stability: Not on file        CURRENT MEDICATIONS:   Current Outpatient Medications   Medication Sig Dispense Refill    aspirin 81 mg chewable tablet       cholecalciferol (Vitamin D-3) 50 mcg (2,000 unit) capsule Take 1 capsule (50 mcg) by mouth early in the morning.. With food      meloxicam (Mobic) 15 mg tablet Take 1 tablet (15 mg) by mouth once daily. 90 tablet 3    metoprolol tartrate (Lopressor) 100 mg tablet Take 1 tablet (100 mg) by mouth every 12 hours. 180 tablet 0    olmesartan-hydrochlorothiazide (Benicar HCT) 20-12.5 mg tablet Take 1 tablet by mouth once daily. 90 tablet 3    pantoprazole (ProtoNix) 40 mg EC tablet  "Take 1 tablet (40 mg) by mouth once daily. 90 tablet 1    semaglutide (Rybelsus) 14 mg tablet tablet Take 1 tablet (14 mg) by mouth once daily. 90 tablet 3    topiramate (Topamax) 50 mg tablet Take 1 tablet (50 mg) by mouth once daily at bedtime. 90 tablet 3    traMADol (Ultram) 50 mg tablet Take 1 tablet (50 mg) by mouth 3 times a day. 270 tablet 0    valACYclovir (Valtrex) 1 gram tablet Take 2 tablets (2,000 mg) by mouth 2 times a day. 4 tablet 5    verapamil ER (Veralan PM) 240 mg 24 hr capsule Take 1 capsule (240 mg) by mouth once daily at bedtime. Do not crush or chew. 90 capsule 3     No current facility-administered medications for this visit.        OBJECTIVE    PHYSICAL EXAM      8/15/2024     4:41 PM 8/15/2024     5:11 PM 10/16/2024     4:55 PM 1/16/2025     4:00 PM 1/28/2025     1:54 PM 2/14/2025     2:35 PM 3/17/2025    11:59 AM   Vitals   Systolic 136 118 106 120 131     Diastolic 90 74 72 80 88     BP Location Right arm  Left arm Left arm      Heart Rate 72  75 68 79     Temp 35.9 °C (96.7 °F)  35.8 °C (96.5 °F)  36.6 °C (97.8 °F)     Resp 16  16  18     Height 1.651 m (5' 5\")  1.651 m (5' 5\") 1.651 m (5' 5\") 1.651 m (5' 5\") 1.651 m (5' 5\") 1.651 m (5' 5\")   Weight (lb) 258.6  261.2 255 263.8 263 263   BMI 43.03 kg/m2  43.47 kg/m2 42.43 kg/m2 43.9 kg/m2 43.77 kg/m2 43.77 kg/m2   BSA (m2) 2.32 m2  2.33 m2 2.31 m2 2.35 m2 2.34 m2 2.34 m2   Visit Report Report Report Report Report Report  Report      There is no height or weight on file to calculate BMI.    GENERAL: A/Ox3, NAD. Appears healthy, well nourished  PSYCHIATRIC: Mood stable, appropriate memory recall  EYES: EOM intact, no scleral icterus  CARDIAC: regular rate  LUNGS: Breathing non-labored  SKIN: no erythema, rashes, or ecchymoses     MUSCULOSKELETAL:  This is a well-appearing patient in no acute distress.    There is no tenderness to palpation along the SC joint, AC joint, clavicle, acromion, or spine of the scapula.  There is tenderness along " the proximal aspect of the biceps tendon.  Active range of motion: forward flexion 80 degrees, abduction 80 degrees, external rotation 30 degrees, internal rotation to sacrum.  Strength: 5/5 to the supraspinatus, infraspinatus, subscapularis.  Negative Hornblower's.  Stability: Anterior/Posterior load and shift stable  Negative Speed's and Yergason's.  Positive Minto's.  Negative Neer and De La Fuente.  The patient is firing the AIN/PIN/median/radial/ulnar/axillary distributions.  The patient is sensate to light touch in the median/radial/ulnar/axillary distributions.  2+ radial pulse.    NEUROVASCULAR:  - Neurovascular Status: sensation intact to light touch distally, upper extremity motor grossly intact  - Capillary refill brisk at extremities, radial pulse 2+    Imaging: Multiple views of the affected right shoulder(s) demonstrate: End-stage glenohumeral osteoarthritis with joint space narrowing, subchondral sclerosis, and osteophyte formation including goats beard.  No evidence of high riding humeral head.   X-rays were personally reviewed and interpreted by me.  Radiology reports were reviewed by me as well, if readily available at the time.        Shahab Jackman MD, MPH  Attending Surgeon  Sports Medicine Orthopaedic Surgery  [facility] [place]

## 2025-04-03 ENCOUNTER — TELEPHONE (OUTPATIENT)
Dept: PRIMARY CARE | Facility: CLINIC | Age: 50
End: 2025-04-03
Payer: COMMERCIAL

## 2025-04-03 DIAGNOSIS — I10 BENIGN ESSENTIAL HYPERTENSION: ICD-10-CM

## 2025-04-03 RX ORDER — METOPROLOL TARTRATE 100 MG/1
100 TABLET ORAL EVERY 12 HOURS
Qty: 60 TABLET | Refills: 0 | Status: SHIPPED | OUTPATIENT
Start: 2025-04-03 | End: 2025-05-03

## 2025-04-03 NOTE — TELEPHONE ENCOUNTER
Patient is requesting a refill on:    metoprolol tartrate (Lopressor) 100 mg tablet   Route: Take 1 tablet (100 mg) by mouth every 12 hours.     [facility]     Last office visit:  1/28/2025    Next office visit:  4/22/2025

## 2025-04-07 ENCOUNTER — PATIENT MESSAGE (OUTPATIENT)
Dept: ORTHOPEDIC SURGERY | Facility: CLINIC | Age: 50
End: 2025-04-07
Payer: COMMERCIAL

## 2025-04-11 ENCOUNTER — PREP FOR PROCEDURE (OUTPATIENT)
Dept: ORTHOPEDIC SURGERY | Facility: CLINIC | Age: 50
End: 2025-04-11
Payer: COMMERCIAL

## 2025-04-11 DIAGNOSIS — M19.011 OSTEOARTHRITIS OF GLENOHUMERAL JOINT, RIGHT: ICD-10-CM

## 2025-04-14 PROBLEM — M19.011 OSTEOARTHRITIS OF GLENOHUMERAL JOINT, RIGHT: Status: ACTIVE | Noted: 2025-04-11

## 2025-04-21 ENCOUNTER — TELEPHONE (OUTPATIENT)
Dept: ORTHOPEDIC SURGERY | Facility: HOSPITAL | Age: 50
End: 2025-04-21
Payer: COMMERCIAL

## 2025-04-21 DIAGNOSIS — M19.011 OSTEOARTHRITIS OF GLENOHUMERAL JOINT, RIGHT: ICD-10-CM

## 2025-04-21 DIAGNOSIS — Z96.611 STATUS POST TOTAL SHOULDER ARTHROPLASTY, RIGHT: ICD-10-CM

## 2025-04-21 NOTE — TELEPHONE ENCOUNTER
Copied from CRM #[id number]. Topic: Outbound call - Spoke with patient  >> Apr 18, 2025  1:03 PM Amina GRAFF wrote:  SPOKE WITH AISHA MAGUIRE [facility] AND SHE STATED THE PT WOULD LIKE TO HAVE THE ORDER FOR THE CT OF THE LT SHOULDER FAXED OVER TO [facility] LOCATION. THEIR FAX NUMBER IS [phone]. BEFORE FAXING THAT ORDER OVER TO THAT LOCATION CAN SOMEONE FIX THE ORDER IN THE SYSTEM. THE CT IS FOR THE LT ARM BUT THE DX IS FOR THE RT ARM, PT WOULD LIKE TO STAY ON THE TANNER AT OUR [facility] FOR NOW, THANK YOU.

## 2025-04-22 ENCOUNTER — APPOINTMENT (OUTPATIENT)
Dept: PRIMARY CARE | Facility: CLINIC | Age: 50
End: 2025-04-22
Payer: COMMERCIAL

## 2025-05-06 ENCOUNTER — APPOINTMENT (OUTPATIENT)
Dept: PRIMARY CARE | Facility: CLINIC | Age: 50
End: 2025-05-06
Payer: COMMERCIAL

## 2025-05-06 ENCOUNTER — APPOINTMENT (OUTPATIENT)
Dept: RADIOLOGY | Facility: HOSPITAL | Age: 50
End: 2025-05-06
Payer: COMMERCIAL

## 2025-05-06 VITALS
TEMPERATURE: 97.3 F | WEIGHT: 265.8 LBS | SYSTOLIC BLOOD PRESSURE: 127 MMHG | BODY MASS INDEX: 44.28 KG/M2 | DIASTOLIC BLOOD PRESSURE: 81 MMHG | HEART RATE: 72 BPM | RESPIRATION RATE: 16 BRPM | OXYGEN SATURATION: 99 % | HEIGHT: 65 IN

## 2025-05-06 DIAGNOSIS — M25.561 CHRONIC PAIN OF RIGHT KNEE: ICD-10-CM

## 2025-05-06 DIAGNOSIS — I10 BENIGN ESSENTIAL HYPERTENSION: ICD-10-CM

## 2025-05-06 DIAGNOSIS — K21.9 CHRONIC GERD: ICD-10-CM

## 2025-05-06 DIAGNOSIS — G89.29 CHRONIC PAIN OF RIGHT KNEE: ICD-10-CM

## 2025-05-06 DIAGNOSIS — F51.01 PRIMARY INSOMNIA: ICD-10-CM

## 2025-05-06 DIAGNOSIS — M17.10 ARTHRITIS OF KNEE: ICD-10-CM

## 2025-05-06 DIAGNOSIS — Z79.899 CONTROLLED SUBSTANCE AGREEMENT SIGNED: ICD-10-CM

## 2025-05-06 PROBLEM — M25.569 KNEE PAIN: Status: RESOLVED | Noted: 2023-04-05 | Resolved: 2025-05-06

## 2025-05-06 PROCEDURE — 3079F DIAST BP 80-89 MM HG: CPT | Performed by: NURSE PRACTITIONER

## 2025-05-06 PROCEDURE — 80305 DRUG TEST PRSMV DIR OPT OBS: CPT | Performed by: NURSE PRACTITIONER

## 2025-05-06 PROCEDURE — 99213 OFFICE O/P EST LOW 20 MIN: CPT | Performed by: NURSE PRACTITIONER

## 2025-05-06 PROCEDURE — 99396 PREV VISIT EST AGE 40-64: CPT | Performed by: NURSE PRACTITIONER

## 2025-05-06 PROCEDURE — 3008F BODY MASS INDEX DOCD: CPT | Performed by: NURSE PRACTITIONER

## 2025-05-06 PROCEDURE — 3074F SYST BP LT 130 MM HG: CPT | Performed by: NURSE PRACTITIONER

## 2025-05-06 PROCEDURE — 1036F TOBACCO NON-USER: CPT | Performed by: NURSE PRACTITIONER

## 2025-05-06 RX ORDER — TRAMADOL HYDROCHLORIDE 50 MG/1
50 TABLET ORAL 3 TIMES DAILY
COMMUNITY
End: 2025-05-06 | Stop reason: SDUPTHER

## 2025-05-06 RX ORDER — TRAMADOL HYDROCHLORIDE 50 MG/1
50 TABLET ORAL 3 TIMES DAILY
Qty: 270 TABLET | Refills: 0 | Status: SHIPPED | OUTPATIENT
Start: 2025-05-06

## 2025-05-06 RX ORDER — MELOXICAM 15 MG/1
15 TABLET ORAL DAILY
Qty: 90 TABLET | Refills: 3 | Status: SHIPPED | OUTPATIENT
Start: 2025-05-06 | End: 2026-05-06

## 2025-05-06 RX ORDER — PANTOPRAZOLE SODIUM 40 MG/1
40 TABLET, DELAYED RELEASE ORAL DAILY
Qty: 90 TABLET | Refills: 1 | Status: SHIPPED | OUTPATIENT
Start: 2025-05-06 | End: 2025-11-02

## 2025-05-06 RX ORDER — QUETIAPINE FUMARATE 25 MG/1
25 TABLET, FILM COATED ORAL NIGHTLY
Qty: 90 TABLET | Refills: 1 | Status: SHIPPED | OUTPATIENT
Start: 2025-05-06 | End: 2025-11-02

## 2025-05-06 RX ORDER — METOPROLOL TARTRATE 100 MG/1
100 TABLET ORAL EVERY 12 HOURS
Qty: 180 TABLET | Refills: 1 | Status: SHIPPED | OUTPATIENT
Start: 2025-05-06 | End: 2025-11-02

## 2025-05-06 ASSESSMENT — ENCOUNTER SYMPTOMS
APNEA: 0
SHORTNESS OF BREATH: 0
WEAKNESS: 0
CHILLS: 0
WHEEZING: 0
DIZZINESS: 0
VOMITING: 0
PALPITATIONS: 0
CONSTIPATION: 0
FEVER: 0
HEADACHES: 0
ACTIVITY CHANGE: 0
CONFUSION: 0
FATIGUE: 0
COUGH: 0
ARTHRALGIAS: 1
DIARRHEA: 0
CONSTITUTIONAL NEGATIVE: 1
NAUSEA: 0
ABDOMINAL PAIN: 0
NERVOUS/ANXIOUS: 0
RESPIRATORY NEGATIVE: 1

## 2025-05-06 NOTE — ASSESSMENT & PLAN NOTE
OARRS:  Shyla Serna, APRN-CNP, DNP on 5/6/2025 10:32 AM  I have personally reviewed the OARRS report for Jacqueline Flores. I have considered the risks of abuse, dependence, addiction and diversion    Is the patient prescribed a combination of a benzodiazepine and opioid?  No    Last Urine Drug Screen / ordered today: Yes  Recent Results (from the past 8760 hours)   Tramadol Confirmation, Urine    Collection Time: 05/06/24  1:16 PM   Result Value Ref Range    Tramadol >1,000 (H) <50 ng/mL    O-Desmethyltramadol >1,000 (H) <50 ng/mL   POCT waived urine drug screen manually resulted    Collection Time: 05/06/24  1:16 PM   Result Value Ref Range    POC THC Negative Negative, Not applicable ng/mL    POC Cocaine Negative Negative, Not applicable ng/mL    POC Opiates Not applicable Negative, Not applicable ng/mL    POC Amphetamine Negative Negative, Not applicable ng/mL    POC Phencyclidine (PCP) Negative Negative, Not applicable ng/mL    POC Barbiturates Negative Negative, Not applicable ng/mL    POC Benzodiazepines Negative Negative, Not applicable ng/mL    POC Methamphetamine Negative Negative, Not applicable ng/mL    POC METHADONE MANUALLY ENTERED Negative Negative, Not applicable ng/mL    POC Ticyclic Antidepressants (TCA) Not applicable Negative, Not applicable ng/mL    POC Oxycodone Negative Negative, Not applicable ng/mL    POC MDMA URINE Negative Negative, Not applicable ng/mL    POC Morphine Urine Negative Negative, Not applicable ng/mL    POC Burprenorphine Urine Negative Negative, Not applicable ng/mL     Results are as expected.         Controlled Substance Agreement:  Date of the Last Agreement:   Reviewed Controlled Substance Agreement including but not limited to the benefits, risks, and alternatives to treatment with a Controlled Substance medication(s).    Opioids:  What is the patient's goal of therapy? Relief of arthritis pain  Is this being achieved with current treatment? yes    I have calculated the  patient's Morphine Dose Equivalent (MED):   I have considered referral to Pain Management and/or a specialist, and do not feel it is necessary at this time.    I feel that it is clinically indicated to continue this current medication regimen after consideration of alternative therapies, and other non-opioid treatment.    Opioid Risk Screening:  No data recorded    Pain Assessment:  No data recorded

## 2025-05-06 NOTE — PROGRESS NOTES
Subjective   Patient ID: Jacqueline Flores is a 50 y.o. female who presents for Annual Exam.    Annual Examination     Right shoulder: loss of cartilage. Bone on bone. Has arthroplasty surgery coning up     Morbid obesity: Wants to consider surgery.   Has tried diet, exercise. On rybelsus for last year. Loss 100 lbs. Attempting to get to bmi 40 or below to have knee surgeries.   Feels like she has plateuad   Hx: Well managed on Benicar and metoprolol    Patient reported Hx of sleep apnea. Did have cpap but intolerant of device d/t sleep positions (prone). Had tonsils, adenoids and uvula removed.   Multiple joint arthritis: on tramadol and meloxicam. Managing pain decently well. UDS and controlled med contract updated today  Primary insomnia: Patient with reports of increasing difficulty sleeping.  Reports she takes topiramate 50 mg at night for sleep management prescribed by her previous PCP that is not improving her sleep.  She would like to trial something different.  No improvement on over-the-counter meds or trazodone at high dose.  Likely sleep apnea could also be contributing.  Patient encouraged to have reevaluation with sleep study  Last sleep study with [facility] in [facility].  There was no evidence of obstructive sleep apnea or periodic limb movements of sleep at that time. Not sure when patient had the positive sleep study and cpap rx from.    Sleep history: The patient is a 38.6 year old female with EDS, AM Headaches, Insomnia, Awakenings. The study started at 22:08:42 and ended at 05:40:22. Total sleep time was 315 minutes resulting in a sleep efficiency of 71%. There were 21 awakenings with a total time awake after sleep onset of 57 minutes. The sleep latency was 69 minutes and the REM latency was 195 minutes. The patient spent 0.0% of sleep time in the supine position. The sleep stage percentages were 0.3% stage 1, 71.5% stage 2, 21.9% stages 3 & 4 and 6.3% REM sleep. There were 47 arousals,  "resulting in an arousal index of 8.9. There were 56 stage shifts. Heavy snoring was noted. There were 16 respiratory events consisting of 0 apneas (0 obstructive, 0 central, 0 mixed) and 16 hypopneas. The apnea-hypopnea index was 3.0. The mean oxygen saturation during the study was 98.0%, with a minimum oxygen saturation of 87.0%. The patient spent 0.0% of sleep time with an oxygen saturation below 90%. The PLM index is 5.1.  ICSDDIAGNOSIS:  Snoring  IMPRESSION:  Overall AHI and PLM indices are within normal limits. There is no evidence of obstructive sleep apnea or periodic limb movements of sleep.  INTERPRETING PHYSICIAN:  Dr. Meg Solorio  Diplomate ABPN, certified in sleep medicine.              Review of Systems   Constitutional: Negative.  Negative for activity change, chills, fatigue and fever.   Respiratory: Negative.  Negative for apnea, cough, shortness of breath and wheezing.    Cardiovascular:  Negative for chest pain and palpitations.   Gastrointestinal:  Negative for abdominal pain, constipation, diarrhea, nausea and vomiting.   Musculoskeletal:  Positive for arthralgias.   Neurological:  Negative for dizziness, weakness and headaches.   Psychiatric/Behavioral:  Negative for confusion. The patient is not nervous/anxious.        Objective   /81   Pulse 72   Temp 36.3 °C (97.3 °F) (Temporal)   Resp 16   Ht 1.651 m (5' 5\")   Wt 121 kg (265 lb 12.8 oz)   SpO2 99%   BMI 44.23 kg/m²     Physical Exam  Vitals reviewed.   Constitutional:       Appearance: Normal appearance.   HENT:      Head: Normocephalic.   Cardiovascular:      Rate and Rhythm: Normal rate and regular rhythm.      Pulses: Normal pulses.      Heart sounds: Normal heart sounds.   Pulmonary:      Effort: Pulmonary effort is normal. No respiratory distress.      Breath sounds: Normal breath sounds. No wheezing.   Abdominal:      General: Bowel sounds are normal.   Neurological:      General: No focal deficit present.      " Mental Status: She is alert and oriented to person, place, and time.   Psychiatric:         Mood and Affect: Mood normal.         Behavior: Behavior normal.         Assessment/Plan   Problem List Items Addressed This Visit           ICD-10-CM    Arthritis of knee M17.10    Follow-up with orthopedic specialist  Continue weight loss regimen  Continue tramadol and meloxicam  Needs knee replacements bilaterally but has a goal of less than 40 BMI prior to surgery  Intolerant NSAIDs         Relevant Medications    meloxicam (Mobic) 15 mg tablet    RESOLVED: Knee pain M25.569    Relevant Medications    traMADol (Ultram) 50 mg tablet    Primary insomnia F51.01    Discontinue the topiramate and start Seroquel 25 mg nightly  Practice good sleep hygiene           Relevant Medications    QUEtiapine (SEROquel) 25 mg tablet    Benign essential hypertension I10    Well-controlled.  Continue Benicar and metoprolol  Call for consistently elevated BP greater than 140/90  No added salt diet, increase physical activity         Relevant Medications    metoprolol tartrate (Lopressor) 100 mg tablet    Chronic GERD K21.9    Well controlled   Continue current poc   Follow up          Relevant Medications    pantoprazole (ProtoNix) 40 mg EC tablet    Controlled substance agreement signed Z79.899    OARRS:  Shyla Serna, APRN-CNP, DNP on 5/6/2025 10:32 AM  I have personally reviewed the OARRS report for Jacqueline Flores. I have considered the risks of abuse, dependence, addiction and diversion    Is the patient prescribed a combination of a benzodiazepine and opioid?  No    Last Urine Drug Screen / ordered today: Yes  Recent Results (from the past 8760 hours)   Tramadol Confirmation, Urine    Collection Time: 05/06/24  1:16 PM   Result Value Ref Range    Tramadol >1,000 (H) <50 ng/mL    O-Desmethyltramadol >1,000 (H) <50 ng/mL   POCT waived urine drug screen manually resulted    Collection Time: 05/06/24  1:16 PM   Result Value Ref Range    POC  THC Negative Negative, Not applicable ng/mL    POC Cocaine Negative Negative, Not applicable ng/mL    POC Opiates Not applicable Negative, Not applicable ng/mL    POC Amphetamine Negative Negative, Not applicable ng/mL    POC Phencyclidine (PCP) Negative Negative, Not applicable ng/mL    POC Barbiturates Negative Negative, Not applicable ng/mL    POC Benzodiazepines Negative Negative, Not applicable ng/mL    POC Methamphetamine Negative Negative, Not applicable ng/mL    POC METHADONE MANUALLY ENTERED Negative Negative, Not applicable ng/mL    POC Ticyclic Antidepressants (TCA) Not applicable Negative, Not applicable ng/mL    POC Oxycodone Negative Negative, Not applicable ng/mL    POC MDMA URINE Negative Negative, Not applicable ng/mL    POC Morphine Urine Negative Negative, Not applicable ng/mL    POC Burprenorphine Urine Negative Negative, Not applicable ng/mL     Results are as expected.         Controlled Substance Agreement:  Date of the Last Agreement:   Reviewed Controlled Substance Agreement including but not limited to the benefits, risks, and alternatives to treatment with a Controlled Substance medication(s).    Opioids:  What is the patient's goal of therapy? Relief of arthritis pain  Is this being achieved with current treatment? yes    I have calculated the patient's Morphine Dose Equivalent (MED):   I have considered referral to Pain Management and/or a specialist, and do not feel it is necessary at this time.    I feel that it is clinically indicated to continue this current medication regimen after consideration of alternative therapies, and other non-opioid treatment.    Opioid Risk Screening:  No data recorded    Pain Assessment:  No data recorded         Relevant Orders    POCT waived urine drug screen manually resulted (Completed)    Tramadol Confirmation, Urine          Patient was identified as a fall risk. Risk prevention instructions provided.

## 2025-05-06 NOTE — ASSESSMENT & PLAN NOTE
Well-controlled.  Continue Benicar and metoprolol  Call for consistently elevated BP greater than 140/90  No added salt diet, increase physical activity

## 2025-05-06 NOTE — PATIENT INSTRUCTIONS

## 2025-05-06 NOTE — ASSESSMENT & PLAN NOTE
Follow-up with orthopedic specialist  Continue weight loss regimen  Continue tramadol and meloxicam  Needs knee replacements bilaterally but has a goal of less than 40 BMI prior to surgery  Intolerant NSAIDs

## 2025-05-08 LAB
DRUG SCREEN COMMENT UR-IMP: ABNORMAL
NORTRAMADOL UR-MCNC: 3657 NG/ML
QUEST NOTES AND COMMENTS: ABNORMAL
TRAMADOL UR-MCNC: 6734 NG/ML

## 2025-05-15 ENCOUNTER — PRE-ADMISSION TESTING (OUTPATIENT)
Dept: PREADMISSION TESTING | Facility: HOSPITAL | Age: 50
End: 2025-05-15
Payer: COMMERCIAL

## 2025-05-15 ENCOUNTER — APPOINTMENT (OUTPATIENT)
Dept: LAB | Facility: HOSPITAL | Age: 50
End: 2025-05-15
Payer: COMMERCIAL

## 2025-05-15 VITALS
SYSTOLIC BLOOD PRESSURE: 118 MMHG | OXYGEN SATURATION: 98 % | RESPIRATION RATE: 16 BRPM | DIASTOLIC BLOOD PRESSURE: 67 MMHG | WEIGHT: 259.92 LBS | HEIGHT: 65 IN | TEMPERATURE: 97.7 F | HEART RATE: 64 BPM | BODY MASS INDEX: 43.31 KG/M2

## 2025-05-15 DIAGNOSIS — Z01.818 PRE-OP EXAMINATION: Primary | ICD-10-CM

## 2025-05-15 DIAGNOSIS — Z01.818 PRE-OP TESTING: ICD-10-CM

## 2025-05-15 LAB
ANION GAP SERPL CALC-SCNC: 12 MMOL/L (ref 10–20)
BUN SERPL-MCNC: 25 MG/DL (ref 6–23)
CALCIUM SERPL-MCNC: 9.6 MG/DL (ref 8.6–10.3)
CHLORIDE SERPL-SCNC: 103 MMOL/L (ref 98–107)
CO2 SERPL-SCNC: 28 MMOL/L (ref 21–32)
CREAT SERPL-MCNC: 0.87 MG/DL (ref 0.5–1.05)
EGFRCR SERPLBLD CKD-EPI 2021: 81 ML/MIN/1.73M*2
ERYTHROCYTE [DISTWIDTH] IN BLOOD BY AUTOMATED COUNT: 12.7 % (ref 11.5–14.5)
EST. AVERAGE GLUCOSE BLD GHB EST-MCNC: 88 MG/DL
GLUCOSE SERPL-MCNC: 90 MG/DL (ref 74–99)
HBA1C MFR BLD: 4.7 % (ref ?–5.7)
HCT VFR BLD AUTO: 46.3 % (ref 36–46)
HGB BLD-MCNC: 15.1 G/DL (ref 12–16)
MCH RBC QN AUTO: 30.7 PG (ref 26–34)
MCHC RBC AUTO-ENTMCNC: 32.6 G/DL (ref 32–36)
MCV RBC AUTO: 94 FL (ref 80–100)
NRBC BLD-RTO: 0 /100 WBCS (ref 0–0)
PLATELET # BLD AUTO: 265 X10*3/UL (ref 150–450)
POTASSIUM SERPL-SCNC: 4.2 MMOL/L (ref 3.5–5.3)
RBC # BLD AUTO: 4.92 X10*6/UL (ref 4–5.2)
SODIUM SERPL-SCNC: 139 MMOL/L (ref 136–145)
WBC # BLD AUTO: 9.2 X10*3/UL (ref 4.4–11.3)

## 2025-05-15 PROCEDURE — 83036 HEMOGLOBIN GLYCOSYLATED A1C: CPT

## 2025-05-15 PROCEDURE — 80048 BASIC METABOLIC PNL TOTAL CA: CPT

## 2025-05-15 PROCEDURE — 85027 COMPLETE CBC AUTOMATED: CPT

## 2025-05-15 PROCEDURE — 99202 OFFICE O/P NEW SF 15 MIN: CPT | Performed by: NURSE PRACTITIONER

## 2025-05-15 PROCEDURE — 87081 CULTURE SCREEN ONLY: CPT | Mod: STJLAB

## 2025-05-15 RX ORDER — LANOLIN ALCOHOL/MO/W.PET/CERES
2000 CREAM (GRAM) TOPICAL DAILY
COMMUNITY

## 2025-05-15 RX ORDER — CHLORHEXIDINE GLUCONATE ORAL RINSE 1.2 MG/ML
SOLUTION DENTAL
Qty: 473 ML | Refills: 0 | Status: SHIPPED | OUTPATIENT
Start: 2025-05-15

## 2025-05-15 ASSESSMENT — DUKE ACTIVITY SCORE INDEX (DASI)
CAN YOU TAKE CARE OF YOURSELF (EAT, DRESS, BATHE, OR USE TOILET): YES
CAN YOU WALK INDOORS, SUCH AS AROUND YOUR HOUSE: YES
DASI METS SCORE: 9
CAN YOU WALK A BLOCK OR TWO ON LEVEL GROUND: YES
CAN YOU PARTICIPATE IN MODERATE RECREATIONAL ACTIVITIES LIKE GOLF, BOWLING, DANCING, DOUBLES TENNIS OR THROWING A BASEBALL OR FOOTBALL: YES
CAN YOU DO YARD WORK LIKE RAKING LEAVES, WEEDING OR PUSHING A MOWER: YES
CAN YOU DO HEAVY WORK AROUND THE HOUSE LIKE SCRUBBING FLOORS OR LIFTING AND MOVING HEAVY FURNITURE: YES
CAN YOU CLIMB A FLIGHT OF STAIRS OR WALK UP A HILL: YES
CAN YOU RUN A SHORT DISTANCE: YES
CAN YOU DO MODERATE WORK AROUND THE HOUSE LIKE VACUUMING, SWEEPING FLOORS OR CARRYING GROCERIES: YES
CAN YOU HAVE SEXUAL RELATIONS: YES
CAN YOU DO LIGHT WORK AROUND THE HOUSE LIKE DUSTING OR WASHING DISHES: YES
CAN YOU PARTICIPATE IN STRENOUS SPORTS LIKE SWIMMING, SINGLES TENNIS, FOOTBALL, BASKETBALL, OR SKIING: NO
TOTAL_SCORE: 50.7

## 2025-05-15 ASSESSMENT — ACTIVITIES OF DAILY LIVING (ADL): ADL_SCORE: 0

## 2025-05-15 ASSESSMENT — LIFESTYLE VARIABLES: SMOKING_STATUS: NONSMOKER

## 2025-05-15 ASSESSMENT — PAIN - FUNCTIONAL ASSESSMENT: PAIN_FUNCTIONAL_ASSESSMENT: 0-10

## 2025-05-15 NOTE — PREPROCEDURE INSTRUCTIONS
Medication List            Accurate as of May 15, 2025 11:20 AM. Always use your most recent med list.                aspirin 81 mg chewable tablet  Additional Medication Adjustments for Surgery: Take last dose 7 days before surgery     chlorhexidine 0.12 % solution  Commonly known as: Peridex  Swish and spit 15 ml for 2 doses, 15mL the night before surgery and 15 ml morning of surgery - swish for 30 seconds -DO NOT SWALLOW, SPIT OUT  Medication Adjustments for Surgery: Take/Use as prescribed     cholecalciferol 50 mcg (2,000 units) capsule  Commonly known as: Vitamin D-3  Additional Medication Adjustments for Surgery: Take last dose 7 days before surgery  Notes to patient: STOP all NSAIDS (Ibuprofen, Motrin, Aleve), vitamins, herbals, supplements, and all over the counter medications for 7 days prior to surgery    Tylenol (Acetaminophen) is okay to take up until the morning of surgery for pain or headache as needed      cyanocobalamin 1,000 mcg tablet  Commonly known as: Vitamin B-12  Additional Medication Adjustments for Surgery: Take last dose 7 days before surgery     meloxicam 15 mg tablet  Commonly known as: Mobic  Take 1 tablet (15 mg) by mouth once daily.  Additional Medication Adjustments for Surgery: Take last dose 7 days before surgery     metoprolol tartrate 100 mg tablet  Commonly known as: Lopressor  Take 1 tablet (100 mg) by mouth every 12 hours.  Medication Adjustments for Surgery: Take/Use as prescribed     olmesartan-hydrochlorothiazide 20-12.5 mg tablet  Commonly known as: Benicar HCT  Take 1 tablet by mouth once daily.  Additional Medication Adjustments for Surgery: Other (Comment)  Notes to patient: HOLD any evening dose the night before the day of surgery  HOLD the day of surgery     pantoprazole 40 mg EC tablet  Commonly known as: ProtoNix  Take 1 tablet (40 mg) by mouth once daily.  Medication Adjustments for Surgery: Take/Use as prescribed     QUEtiapine 25 mg tablet  Commonly known as:  SEROquel  Take 1 tablet (25 mg) by mouth once daily at bedtime.  Medication Adjustments for Surgery: Take/Use as prescribed     semaglutide 14 mg tablet tablet  Commonly known as: Rybelsus  Take 1 tablet (14 mg) by mouth once daily.  Medication Adjustments for Surgery: Do Not take on the morning of surgery  Additional Medication Adjustments for Surgery: Other (Comment)  Notes to patient: START a clear liquid diet 24 hours before your surgery- see instructions provided     traMADol 50 mg tablet  Commonly known as: Ultram  Take 1 tablet (50 mg) by mouth 3 times a day.  Medication Adjustments for Surgery: Take/Use as prescribed     valACYclovir 1 gram tablet  Commonly known as: Valtrex  Take 2 tablets (2,000 mg) by mouth 2 times a day.  Medication Adjustments for Surgery: Take/Use as prescribed     verapamil  mg 24 hr capsule  Commonly known as: Veralan PM  Take 1 capsule (240 mg) by mouth once daily at bedtime. Do not crush or chew.  Medication Adjustments for Surgery: Take/Use as prescribed                                  PRE-OPERATIVE INSTRUCTIONS    You will receive notification one business day prior to your procedure to confirm your arrival time. It is important that you answer your phone and/or check your messages during this time. If you do not hear from the surgery center by 5 pm. the day before your procedure, please call [phone].     Please enter the building through the Outpatient entrance and take the elevator off the lobby to the 2nd floor then check in at the Outpatient Surgery desk on the 2nd floor.    INSTRUCTIONS:  Talk to your surgeon for instructions if you should stop your aspirin, blood thinner, or diabetes medicines.  DO NOT take any multivitamins or over the counter supplements for 7-10 days before surgery.  If not being admitted, you must have an adult immediately available to drive you home after surgery. We also highly recommend you have someone stay with you for the entire day  and night of your surgery.  For children having surgery, a parent or legal guardian must accompany them to the surgery center. If this is not possible, please call [phone] to make additional arrangements.  For adults who are unable to consent or make medical decisions for themselves, a legal guardian or Power of  must accompany them to the surgery center. If this is not possible, please call [phone] to make additional arrangements.  Wear comfortable, loose fitting clothing.  All jewelry and piercings must be removed. If you are unable to remove an item or have a dermal piercing, please be sure to tell the nurse when you arrive for surgery.  Nail polish and make-up must be removed.  Avoid smoking or consuming alcohol for 24 hours before surgery.  To help prevent infection, please take a shower/bath and wash your hair the night before and/or morning of surgery (or follow other specific bathing instructions provided).    Preoperative Fasting Guidelines    Why must I stop eating and drinking near surgery time?  With sedation, food or liquid in your stomach can enter your lungs causing serious complications  Increases nausea and vomiting    When do I need to stop eating and drinking before my surgery?  Do not eat any solid food after midnight the night before your surgery/procedure unless otherwise instructed by your surgeon.   If you take a GLP-1 medication (ex: Trulicity, Ozempic, Mounjaro, Zepbound, Bydyreon, Tanzeun, Saxenda, Victoza, Adlyxin, Rybelus) please follow other specific instructions provided regarding preoperative          fasting.  You may have up to 13.5 ounces of clear liquid until TWO hours before your instructed arrival time to the hospital.  This includes water, black tea/coffee, (no milk or cream) apple juice, and electrolyte drinks (Gatorade).   You may chew gum until TWO hours before your surgery/procedure         If applicable, notify your surgeons office immediately of any new  skin changes that occur to the surgical limb.      If you have any questions or concerns, please call Pre-Admission Testing at [phone].       Preoperative Fasting Guidelines for patients taking GLP-1 medications:      Why must I stop eating and drinking before surgery?    · With anesthesia, food or liquid in your stomach can enter your lungs causing serious complications    · GLP-1 medications can slow the movement of food through your stomach and intestines. This further increases the risk of food entering your lungs with anesthesia    When do I need to stop eating and drinking before my surgery?    · To help ensure food has passed out of your stomach, START a clear liquid diet 24 hours before your surgery    · On the day of your surgery/procedure, STOP all clear liquids 2 hours before your arrival time to the hospital/facility    A clear liquid diet consists of clear liquids and foods that melt into a clear liquid (i.e. gelatin) and excludes solid foods and liquids you cannot see through (i.e. milk). Clears can and should contain sugar to obtain a sufficient number of calories. A clear liquid diet includes    · Clear, fat-free broth    · Clear nutritional drinks    · Pulp-free popsicles, vegetable and fruit juice    · Gelatin    · Coffee and tea without creamer or milk    · Clear soda and sports drinks      Diabetic Patients    · Clear liquids should not be sugar-free    · Check your blood glucose levels as you normally do    · If you have symptoms of low blood glucose (shakiness, sweating, dizziness, confusion) or high blood glucose (dry mouth, excessive thirst, frequent urination, blurry vision), check your blood glucose level    · For low blood glucose increase your consumption of sugar-containing clear liquid    · For high blood glucose, decrease your consumption of sugar-containing clears and treat as you normally would    · If symptoms persist seek medical attention      Examples of GLP-1  Medications:  · Trulicity   · Ozempic    · Mounjaro   · Zepbound  · Bydyreon   · Tanzeun · Saxenda · Victoza · Adlyxin · Rybelus

## 2025-05-15 NOTE — PREPROCEDURE INSTRUCTIONS
Thank you for visiting Preadmission Testing at [facility]. If you have any changes to your health condition, please call the SURGEON's office to alert them and give them details of your symptoms.        Preoperative Brain Exercises    What are brain exercises?  A brain exercise is any activity that engages your thinking (cognitive) skills.    What types of activities are considered brain exercises?  Jigsaw puzzles, crossword puzzles, word jumble, memory games, word search, and many more.  Many can be found free online or on your phone via a mobile lisa.    Why should I do brain exercises before my surgery?  More recent research has shown brain exercise before surgery can lower the risk of postoperative delirium (confusion) which can be especially important for older adults.  Patients who did brain exercises for 5 to 10 hours the days before surgery, cut their risk of postoperative delirium in half up to 1 week after surgery.      Preoperative Deep Breathing Exercises    Why it is important to do deep breathing exercises before my surgery?  Deep breathing exercises strengthen your breathing muscles.  This helps you to recover after your surgery and decreases the chance of breathing complications.    How are the deep breathing exercises done?  Sit straight with your back supported.  Breathe in deeply and slowly through your nose. Your lower rib cage should expand and your abdomen may move forward.  Hold that breath for 3 to 5 seconds.  Breathe out through pursed lips, slowly and completely.  Rest and repeat 10 times every hour while awake.  Rest longer if you become dizzy or lightheaded.      Patient and Family Education   Ways You Can Help Prevent Blood Clots     This handout explains some simple things you can do to help prevent blood clots.      Blood clots are blockages that can form in the body's veins. When a blood clot forms in your deep veins, it may be called a deep vein thrombosis, or DVT for short. Blood clots can  happen in any part of the body where blood flows, but they are most common in the arms and legs. If a piece of a blood clot breaks free and travels to the lungs, it is called a pulmonary embolus (PE). A PE can be a very serious problem.      Being in the hospital or having surgery can raise your chances of getting a blood clot because you may not be well enough to move around as much as you normally do.      Ways you can help prevent blood clots in the hospital         Wearing SCDs. SCDs stands for Sequential Compression Devices.   SCDs are special sleeves that wrap around your legs  They attach to a pump that fills them with air to gently squeeze your legs every few minutes.   This helps return the blood in your legs to your heart.   SCDs should only be taken off when walking or bathing.   SCDs may not be comfortable, but they can help save your life.               Wearing compression stockings - if your doctor orders them. These special snug fitting stockings gently squeeze your legs to help blood flow.       Walking. Walking helps move the blood in your legs.   If your doctor says it is ok, try walking the halls at least   5 times a day. Ask us to help you get up, so you don't fall.      Taking any blood thinning medicines your doctor orders.          ©[facility]; 3/23        Ways you can help prevent blood clots at home       Wearing compression stockings - if your doctor orders them. ? Walking - to help move the blood in your legs.       Taking any blood thinning medicines your doctor orders.      Signs of a blood clot or PE      Tell your doctor or nurse know right away if you have of the problems listed below.    If you are at home, seek medical care right away. Call 911 for chest pain or problems breathing.          Signs of a blood clot (DVT) - such as pain,  swelling, redness or warmth in your arm or leg      Signs of a pulmonary embolism (PE) - such as chest     pain or feeling short of breath

## 2025-05-15 NOTE — CPM/PAT H&P
CPM/PAT Evaluation       Name: Jacqueline Flores (Jacqueline Flores)  /Age: 1975/50 y.o.     In-Person       Chief Complaint: pre op appointment for upcoming surgery    HPI    TW is a 49 yo female with medical history of hypertension, obstructive sleep apnea, GERD, morbid obesity taking glp-1 for weight loss, osteoarthritis, and insomnia. She has been having ongoing right shoulder pains that have worsened over time. Imaging shows right shoulder OA.  Treatment options discussed with orthopedic surgeon subsequently scheduled for right shoulder arthroplasty. Presents to [facility] today for perioperative risk stratification and optimization. She endorses    Otherwise denies any recent illness, fever/chills, chest pains or shortness of breath.     Medical History[1]    Surgical History[2]    Patient  reports being sexually active and has had partner(s) who are male.    Family History[3]    Allergies[4]    Prior to Admission medications    Medication Sig Start Date End Date Taking? Authorizing Provider   aspirin 81 mg chewable tablet     Historical Provider, MD   chlorhexidine (Peridex) 0.12 % solution Swish and spit 15 ml for 2 doses, 15mL the night before surgery and 15 ml morning of surgery - swish for 30 seconds -DO NOT SWALLOW, SPIT OUT 5/15/25   EUGENE Luke   cholecalciferol (Vitamin D-3) 50 mcg (2,000 unit) capsule Take 1 capsule (50 mcg) by mouth early in the morning.. With food 3/11/22   Historical Provider, MD   meloxicam (Mobic) 15 mg tablet Take 1 tablet (15 mg) by mouth once daily. 25  EUGENE Bo DNP   metoprolol tartrate (Lopressor) 100 mg tablet Take 1 tablet (100 mg) by mouth every 12 hours. 25  EUGENE Bo DNP   olmesartan-hydrochlorothiazide (Benicar HCT) 20-12.5 mg tablet Take 1 tablet by mouth once daily. 10/16/24 10/16/25  Evelin Robbins MD   pantoprazole (ProtoNix) 40 mg EC tablet Take 1 tablet (40 mg) by mouth once daily. 25  11/2/25  EUGENE Bo DNP   QUEtiapine (SEROquel) 25 mg tablet Take 1 tablet (25 mg) by mouth once daily at bedtime. 5/6/25 11/2/25  EUGENE Bo DNP   semaglutide (Rybelsus) 14 mg tablet tablet Take 1 tablet (14 mg) by mouth once daily. 10/16/24 10/16/25  Evelin Robbins MD   traMADol (Ultram) 50 mg tablet Take 1 tablet (50 mg) by mouth 3 times a day. 5/6/25   EUGENE Bo DNP   valACYclovir (Valtrex) 1 gram tablet Take 2 tablets (2,000 mg) by mouth 2 times a day. 1/31/25   EUGENE Bo DNP   verapamil ER (Veralan PM) 240 mg 24 hr capsule Take 1 capsule (240 mg) by mouth once daily at bedtime. Do not crush or chew. 1/16/25 1/16/26  Mario Herring MD        PAT ROS   Constitutional: Negative for fever, chills, or sweats   ENMT: Negative for nasal discharge, congestion, ear pain, mouth pain, throat pain   Respiratory: Negative for cough, wheezing, shortness of breath   Cardiac: Negative for chest pain, dyspnea on exertion, palpitations   Gastrointestinal: Negative for nausea, vomiting, diarrhea, constipation, abdominal pain  Genitourinary: Negative for dysuria, flank pain, frequency, hematuria     Musculoskeletal: Positive for decreased ROM, pain, weakness in right shoulder  + general arthralgias    Neurological: Negative for dizziness, confusion, headache  Psychiatric: Negative for mood changes   Skin: Negative for itching, rash, ulcer    Hematologic/Lymph: Negative for bruising, easy bleeding  Allergic/Immunologic: Negative itching, sneezing, swelling    Physical Exam  Constitutional:       Appearance: Normal appearance. She is obese.   HENT:      Head: Normocephalic.      Mouth/Throat:      Mouth: Mucous membranes are moist.   Eyes:      Extraocular Movements: Extraocular movements intact.   Cardiovascular:      Rate and Rhythm: Normal rate and regular rhythm.   Pulmonary:      Effort: Pulmonary effort is normal.      Breath sounds: Normal breath sounds.   Abdominal:  "     General: Abdomen is flat.      Palpations: Abdomen is soft.   Musculoskeletal:      Right shoulder: Decreased range of motion.      Cervical back: Normal range of motion.   Skin:     General: Skin is warm and dry.   Neurological:      General: No focal deficit present.      Mental Status: She is alert.   Psychiatric:         Mood and Affect: Mood normal.        PAT AIRWAY:   Airway:     Mallampati::  II    Neck ROM::  Full  normal        Testing/Diagnostic:   Encounter Date: 01/16/25   ECG 12 Lead   Result Value    Ventricular Rate 68    Atrial Rate 68    HI Interval 152    QRS Duration 90    QT Interval 396    QTC Calculation(Bazett) 421    P Axis 60    R Axis 9    T Axis 66    QRS Count 12    Q Onset 220    P Onset 144    P Offset 201    T Offset 418    QTC Fredericia 413    Narrative    Normal sinus rhythm  Normal ECG  When compared with ECG of 08-APR-2024 21:32,  No significant change was found  Confirmed by Mario Herring ([id number]) on 1/16/2025 4:08:21 PM       Lab Results   Component Value Date    WBC 14.1 (H) 10/09/2023    HGB 15.3 10/09/2023    HCT 46.6 (H) 10/09/2023    MCV 92 10/09/2023     10/09/2023     Lab Results   Component Value Date    GLUCOSE 83 08/16/2024    CALCIUM 9.2 08/16/2024     08/16/2024    K 3.9 08/16/2024    CO2 29 08/16/2024     08/16/2024    BUN 32 (H) 08/16/2024    CREATININE 1.02 08/16/2024     Hemoglobin A1C   Date Value Ref Range Status   08/16/2024 5.6 see below % Final      Patient Specialist/PCP: FERNANDO Serna CNP  Cards: Dr. Herring    Visit Vitals  /67   Pulse 64   Temp 36.5 °C (97.7 °F) (Temporal)   Resp 16   Ht 1.651 m (5' 5\")   Wt 118 kg (259 lb 14.8 oz)   SpO2 98%   BMI 43.25 kg/m²   OB Status Menopausal   Smoking Status Former   BSA 2.33 m²       DASI Risk Score    No data to display       Caprini DVT Assessment    No data to display       Modified Frailty Index    No data to display       [id number] Stroke Risk Points  Current as of just now        " N/A 0 to 9 Points:      Last Change: N/A          The GQM1WC1-QFMq risk score (Lip JUDAH, et al. 2009. © 2010 American [employer]) quantifies the risk of stroke for a patient with atrial fibrillation. For patients without atrial fibrillation or under the age of 18 this score appears as N/A. Higher score values generally indicate higher risk of stroke.        This score is not applicable to this patient. Components are not calculated.          Revised Cardiac Risk Index    No data to display       Apfel Simplified Score    No data to display       Risk Analysis Index Results This Encounter         5/15/2025  1112             When did you begin living in the place you are currently residing?: Three months or greater    Any kidney failure, kidney not working well, or seeing a kidney doctor (nephrologist)? If yes, was this for kidney stones or another problem?: 0 No    Any history of chronic (long-term) congestive heart failure (CHF)?: 0 No    Any shortness of breath when resting?: 0 No    In the past five years, have you been diagnosed with or treated for cancer?: No    During the last 3 months has it become difficult for you to remember things or organize your thoughts?: 0 No    Have you lost weight of 10 pounds or more in the past 3 months without trying?: 0 No    Do you have any loss of appetitie?: 0 No    Getting Around (Mobility): 0 Can get around without help    Eatin Can plan and prepare own meals    Toiletin Can use toilet without any help    Personal Hygiene (Bathing, Hand Washing, Changing Clothes): 0 Can shower or bathe without any help    CHOUDHURY Cancer History: Patient does not indicate history of cancer          Prodigy: High Risk  Total Score: 3              Prodigy Previous Opioid Use Score           ARISCAT Score for Postoperative Pulmonary Complications    No data to display       Herring Perioperative Risk for Myocardial Infarction or Cardiac Arrest (BOLIVAR)    No data to display          Assessment and Plan:     Pre-Op  51 yo female scheduled for ARTHROPLASTY, SHOULDER, TOTAL- right on 5/29/2025 with Dr. Jackman. Blood work and MRSA ordered- oral chlorahexidine prescribed. Previous EKG on file from Jan 2025- shows NSR. Otherwise no further orders indicated.     Cardiac  -Hypertension, compliant with olmesartan-hydrochlorothiazide, metoprolol and metoprolol  -Hyperlipidemia, takes daily low-dose aspirin  -follows with cardiology  DASI Score:   50.7  MET Score:  9  RCRI  0 which is 3.9% 30 day risk of MACE (risk for cardiac death, nonfatal myocardial infarction, and nonfactal cardiac arrest)  BOLIVAR score which indicates a  0.12 % risk of intraoperative or 30-day postoperative MACE    Pulmonary  -Obstructive sleep apnea, non compliant with CPAP; since has had nasal and uvulectomy over 10 years ago    -Preoperative deep breathing educational handout provided to patient.  STOP BANG:   3  points which is a intermediate risk for moderate to severe CRYSTAL  ARISCAT:  19  points which is a low (1.6%) risk of in-hospital post-op pulmonary complications     Neuro  History of TIA, dx 15 years ago- no residual deficit    Endocrine  -Obese, BMI 44.23- taking GLP-1 oral Rybelsus for weight loss (instructed on extended fast pre operatively per anesthesia guidelines)    GI  GERD, taking PPI  Apfel: 3 points 61% risk for post operative N/V    /Renal  Counseled on avoiding NSAIDs, adequate hydration    Musculoskeletal   Osteoarthritis of glenohumeral joint, right    Hematologic  No hematological medical history, however due to high Caprini score of 7 patient is at HIGH risk for perioperative DVT, informative education handout provided    Psych  Insomnia, not taking Seroquel at this time     Skin check: Patient was instructed to make surgeon aware of any skin changes/concerns prior to surgery.     Anesthesia:  Patient denies any anesthesia complications.     See risk scores as previously documented.               [1]   Past Medical History:  Diagnosis Date    Arthritis     Arthritis of knee 04/05/2023    Unable to take NSAIDs and cannot have surgery until loses weight. Unable to increase activity due to poor pain control. Was started on Tramadol to help until loses enough weight to get surgery. CSA and UDS initially done in [place] 11/21/22.   Pain improved on her Tramadol 50 mg enough to function but not lasting long enough. Increased Tramadol to tid as did not last through workday. Tking extr    Benign essential hypertension 04/05/2023    Evelin Robbins  Sep 02, 2022 7:02PM  Stable on meds, BP a little high today. She will get labs and follow up in a month.     Emily King  Mar 11, 2022 2:35PM  Stable. Continue olmesartan-HCTZ, verapamil and metoprolol. Refills provided. Ordered routine blood work for 6 months.     Emily King  Nov 12, 2021 3:51PM  Stable. Continue olmesartan-HCTZ, verapamil and met    Chronic GERD 04/05/2023    Evelin Robbins  Sep 02, 2022 7:02PM  Is on Protonix. Address at follow up next month. No current xs.     Eimly King  Mar 11, 2022 2:35PM  Stable. Continue protonix.  Refill provided.     Emily King  Nov 12, 2021 3:51PM  Stable. Continue protonix.  Refill provided.     Emily King  Jun 08, 2021 4:19PM  Stable. Continue protonix.  Refill provided.    Chronic pain of right knee 04/05/2023       seeing ortho now. THey told her cannot have surgery until BMI is down.  She failed Voltaren gel 1 gm 4 times a day and unable to take oral NSAIDs. On Tramadol 50 mg tid for pain control pending weight loss and surgery.     History of stroke 06/09/2014    Morbid obesity (Multi) 04/05/2023    Evelin Robbins  Nov 21, 2022 5:35PM  Will increase dose of Tramadol to help with pain walking. Increasing activity will help her lose weight to get to goal to get knee replaced. Inactivity is counterproductive for her health. She is  "tolerating well.    Prediabetes 04/05/2023       Increased Rybelsus to 7 mg. Did not tolerate and decreased to 3 mg again. Last A1C 6.8 but others all less than 6.5.        Primary osteoarthritis of right knee 04/05/2023    [id number], Emily  Mar 11, 2022 2:35PM  Follows with orthopedics.  S/p steroid and gel injections this past summer. Continue meloxicam, tylenol and tramadol.     vc-[id number]-[id number], Kelsey  Nov 12, 2021 4:13PM  Continues to have pain. Follows with orthopedics.  S/p steroid and gel injections this past summer. Continue meloxicam and tylenol. Provided 16 tabs of tramadol to use BID 2 days per    Vitamin D deficiency 04/05/2023    Evelin Robbins  Sep 02, 2022 7:02PM  Overdue for labs. Follow up one month.     Emily King  Mar 11, 2022 2:35PM  Will start on daily vitamin D 2000 IU daily and recheck in 6 months.     eo-[id number]-[id number], Kelsey  Nov 12, 2021 3:51PM  Continue weekly vitamin D as previously prescribed with repeat vitamin D after treatment.   [2]   Past Surgical History:  Procedure Laterality Date    ADENOIDECTOMY      OTHER SURGICAL HISTORY  06/08/2021    Tonsillectomy with adenoidectomy    OTHER SURGICAL HISTORY      uvula    TONSILLECTOMY     [3]   Family History  Problem Relation Name Age of Onset    Leukemia Mother Nicole     Cancer Mother Nicole     Hypertension Mother Nicole     Hypertension Father      Thyroid disease Maternal Grandmother Tiffany     Cerebral aneurysm Maternal Grandfather      Esophageal cancer Paternal Grandmother Shawanda     Cancer Paternal Grandmother Shawanda     Diabetes Paternal Grandmother Shawanda     Cancer Father's Sister Kristy     Cancer Father's Sister Kristy     Leukemia Other Niece     Brain cancer Other Aunt    [4]   Allergies  Allergen Reactions    Adhesive Tape-Silicones Hives    Pollens Extract Other     Seasonal- congestion, sneezing    Seroquel [Quetiapine] Diarrhea     \"Godinez\"     "

## 2025-05-17 LAB — STAPHYLOCOCCUS SPEC CULT: NORMAL

## 2025-05-19 ENCOUNTER — PATIENT MESSAGE (OUTPATIENT)
Dept: PRIMARY CARE | Facility: CLINIC | Age: 50
End: 2025-05-19
Payer: COMMERCIAL

## 2025-05-19 DIAGNOSIS — F51.01 PRIMARY INSOMNIA: Primary | ICD-10-CM

## 2025-05-20 ENCOUNTER — DOCUMENTATION (OUTPATIENT)
Dept: SURGERY | Facility: CLINIC | Age: 50
End: 2025-05-20
Payer: COMMERCIAL

## 2025-05-20 RX ORDER — TRAZODONE HYDROCHLORIDE 50 MG/1
50 TABLET ORAL NIGHTLY PRN
Qty: 90 TABLET | Refills: 1 | Status: SHIPPED | OUTPATIENT
Start: 2025-05-20 | End: 2025-11-16

## 2025-05-20 NOTE — PROGRESS NOTES
Initial bariatric nurse interview completed. Discussed with patient program progression/expectations along with program requirements and necessary clearances. Explained to patient they must travel with support person to all visits. Went over initial evaluation visit: labwork, appointment with Dr. Posada, appointment with dietician, and appointment with psych. Patient aware of the $6850 Total OOP maximum, $930 for Initial evaluation visit if OOP not met. Any amount owed must be paid within 10 business days of visits. Patient having shoulder surgery 5/29. Patient verbalized understanding to all. All questions answered. Scheduled initial visit for 6/23/2025. Sent New Patient Handbook including assessment forms & contracts.

## 2025-05-27 ENCOUNTER — PROCEDURE VISIT (OUTPATIENT)
Facility: CLINIC | Age: 50
End: 2025-05-27
Payer: COMMERCIAL

## 2025-05-27 ENCOUNTER — APPOINTMENT (OUTPATIENT)
Dept: SPORTS MEDICINE | Facility: HOSPITAL | Age: 50
End: 2025-05-27
Payer: COMMERCIAL

## 2025-05-27 PROBLEM — Z98.84 BARIATRIC SURGERY STATUS: Status: ACTIVE | Noted: 2025-05-27

## 2025-05-27 PROBLEM — Z01.818 PREOPERATIVE CLEARANCE: Status: ACTIVE | Noted: 2025-05-27

## 2025-05-27 PROCEDURE — E0218 FLUID CIRC COLD PAD W PUMP: HCPCS | Performed by: STUDENT IN AN ORGANIZED HEALTH CARE EDUCATION/TRAINING PROGRAM

## 2025-05-29 ENCOUNTER — HOSPITAL ENCOUNTER (OUTPATIENT)
Facility: HOSPITAL | Age: 50
Setting detail: OUTPATIENT SURGERY
Discharge: HOME | End: 2025-05-29
Attending: STUDENT IN AN ORGANIZED HEALTH CARE EDUCATION/TRAINING PROGRAM | Admitting: STUDENT IN AN ORGANIZED HEALTH CARE EDUCATION/TRAINING PROGRAM
Payer: COMMERCIAL

## 2025-05-29 ENCOUNTER — ANESTHESIA EVENT (OUTPATIENT)
Dept: OPERATING ROOM | Facility: HOSPITAL | Age: 50
End: 2025-05-29
Payer: COMMERCIAL

## 2025-05-29 ENCOUNTER — PHARMACY VISIT (OUTPATIENT)
Dept: PHARMACY | Facility: CLINIC | Age: 50
End: 2025-05-29
Payer: COMMERCIAL

## 2025-05-29 ENCOUNTER — APPOINTMENT (OUTPATIENT)
Dept: RADIOLOGY | Facility: HOSPITAL | Age: 50
End: 2025-05-29
Payer: COMMERCIAL

## 2025-05-29 ENCOUNTER — ANESTHESIA (OUTPATIENT)
Dept: OPERATING ROOM | Facility: HOSPITAL | Age: 50
End: 2025-05-29
Payer: COMMERCIAL

## 2025-05-29 VITALS
TEMPERATURE: 97.9 F | HEART RATE: 86 BPM | BODY MASS INDEX: 44.15 KG/M2 | RESPIRATION RATE: 18 BRPM | WEIGHT: 265 LBS | DIASTOLIC BLOOD PRESSURE: 71 MMHG | OXYGEN SATURATION: 96 % | SYSTOLIC BLOOD PRESSURE: 117 MMHG | HEIGHT: 65 IN

## 2025-05-29 DIAGNOSIS — B00.1 RECURRENT COLD SORES: ICD-10-CM

## 2025-05-29 DIAGNOSIS — Z96.611 STATUS POST TOTAL SHOULDER ARTHROPLASTY, RIGHT: Primary | ICD-10-CM

## 2025-05-29 LAB — HCG UR QL IA.RAPID: NEGATIVE

## 2025-05-29 PROCEDURE — 7100000009 HC PHASE TWO TIME - INITIAL BASE CHARGE: Performed by: STUDENT IN AN ORGANIZED HEALTH CARE EDUCATION/TRAINING PROGRAM

## 2025-05-29 PROCEDURE — 23430 REPAIR BICEPS TENDON: CPT | Performed by: STUDENT IN AN ORGANIZED HEALTH CARE EDUCATION/TRAINING PROGRAM

## 2025-05-29 PROCEDURE — 73020 X-RAY EXAM OF SHOULDER: CPT | Mod: RIGHT SIDE | Performed by: RADIOLOGY

## 2025-05-29 PROCEDURE — 3700000001 HC GENERAL ANESTHESIA TIME - INITIAL BASE CHARGE: Performed by: STUDENT IN AN ORGANIZED HEALTH CARE EDUCATION/TRAINING PROGRAM

## 2025-05-29 PROCEDURE — 7100000010 HC PHASE TWO TIME - EACH INCREMENTAL 1 MINUTE: Performed by: STUDENT IN AN ORGANIZED HEALTH CARE EDUCATION/TRAINING PROGRAM

## 2025-05-29 PROCEDURE — A6213 FOAM DRG >16<=48 SQ IN W/BDR: HCPCS | Performed by: STUDENT IN AN ORGANIZED HEALTH CARE EDUCATION/TRAINING PROGRAM

## 2025-05-29 PROCEDURE — 2500000004 HC RX 250 GENERAL PHARMACY W/ HCPCS (ALT 636 FOR OP/ED): Mod: JZ | Performed by: ANESTHESIOLOGIST ASSISTANT

## 2025-05-29 PROCEDURE — 7100000002 HC RECOVERY ROOM TIME - EACH INCREMENTAL 1 MINUTE: Performed by: STUDENT IN AN ORGANIZED HEALTH CARE EDUCATION/TRAINING PROGRAM

## 2025-05-29 PROCEDURE — RXMED WILLOW AMBULATORY MEDICATION CHARGE

## 2025-05-29 PROCEDURE — 3600000009 HC OR TIME - EACH INCREMENTAL 1 MINUTE - PROCEDURE LEVEL FOUR: Performed by: STUDENT IN AN ORGANIZED HEALTH CARE EDUCATION/TRAINING PROGRAM

## 2025-05-29 PROCEDURE — 3600000004 HC OR TIME - INITIAL BASE CHARGE - PROCEDURE LEVEL FOUR: Performed by: STUDENT IN AN ORGANIZED HEALTH CARE EDUCATION/TRAINING PROGRAM

## 2025-05-29 PROCEDURE — 7100000001 HC RECOVERY ROOM TIME - INITIAL BASE CHARGE: Performed by: STUDENT IN AN ORGANIZED HEALTH CARE EDUCATION/TRAINING PROGRAM

## 2025-05-29 PROCEDURE — 2780000003 HC OR 278 NO HCPCS: Performed by: STUDENT IN AN ORGANIZED HEALTH CARE EDUCATION/TRAINING PROGRAM

## 2025-05-29 PROCEDURE — 2500000005 HC RX 250 GENERAL PHARMACY W/O HCPCS: Performed by: ANESTHESIOLOGIST ASSISTANT

## 2025-05-29 PROCEDURE — C1776 JOINT DEVICE (IMPLANTABLE): HCPCS | Performed by: STUDENT IN AN ORGANIZED HEALTH CARE EDUCATION/TRAINING PROGRAM

## 2025-05-29 PROCEDURE — C1769 GUIDE WIRE: HCPCS | Performed by: STUDENT IN AN ORGANIZED HEALTH CARE EDUCATION/TRAINING PROGRAM

## 2025-05-29 PROCEDURE — 81025 URINE PREGNANCY TEST: CPT | Performed by: NURSE PRACTITIONER

## 2025-05-29 PROCEDURE — 73020 X-RAY EXAM OF SHOULDER: CPT | Mod: RT

## 2025-05-29 PROCEDURE — 3700000002 HC GENERAL ANESTHESIA TIME - EACH INCREMENTAL 1 MINUTE: Performed by: STUDENT IN AN ORGANIZED HEALTH CARE EDUCATION/TRAINING PROGRAM

## 2025-05-29 PROCEDURE — 2720000007 HC OR 272 NO HCPCS: Performed by: STUDENT IN AN ORGANIZED HEALTH CARE EDUCATION/TRAINING PROGRAM

## 2025-05-29 PROCEDURE — C1713 ANCHOR/SCREW BN/BN,TIS/BN: HCPCS | Performed by: STUDENT IN AN ORGANIZED HEALTH CARE EDUCATION/TRAINING PROGRAM

## 2025-05-29 PROCEDURE — 23472 RECONSTRUCT SHOULDER JOINT: CPT | Performed by: STUDENT IN AN ORGANIZED HEALTH CARE EDUCATION/TRAINING PROGRAM

## 2025-05-29 PROCEDURE — 2500000004 HC RX 250 GENERAL PHARMACY W/ HCPCS (ALT 636 FOR OP/ED): Performed by: ANESTHESIOLOGY

## 2025-05-29 DEVICE — AEQUALIS PERFORM+ GUIDE PIN, 2.5 X 220MM: Type: IMPLANTABLE DEVICE | Site: SHOULDER | Status: NON-FUNCTIONAL

## 2025-05-29 DEVICE — GUIDE PIN
Type: IMPLANTABLE DEVICE | Site: SHOULDER | Status: NON-FUNCTIONAL
Brand: TORNIER PERFORM

## 2025-05-29 DEVICE — SIMPLEX® HV IS A FAST-SETTING ACRYLIC RESIN FOR USE IN BONE SURGERY. MIXING THE TWO SEPARATE STERILE COMPONENTS PRODUCES A DUCTILE BONE CEMENT WHICH, AFTER HARDENING, FIXES THE IMPLANT AND TRANSFERS STRESSES PRODUCED DURING MOVEMENT EVENLY TO THE BONE. SIMPLEX® HV CEMENT POWDER ALSO CONTAINS INSOLUBLE ZIRCONIUM DIOXIDE AS AN X-RAY CONTRAST MEDIUM. SIMPLEX® HV DOES NOT EMIT A SIGNAL AND DOES NOT POSE A SAFETY RISK IN A MAGNETIC RESONANCE ENVIRONMENT.
Type: IMPLANTABLE DEVICE | Site: SHOULDER | Status: FUNCTIONAL
Brand: SIMPLEX HV

## 2025-05-29 DEVICE — GUIDE PIN, 3 X 75MM, STERILE: Type: IMPLANTABLE DEVICE | Site: SHOULDER | Status: NON-FUNCTIONAL

## 2025-05-29 RX ORDER — ONDANSETRON 4 MG/1
4 TABLET, FILM COATED ORAL EVERY 8 HOURS PRN
Qty: 20 TABLET | Refills: 0 | Status: SHIPPED | OUTPATIENT
Start: 2025-05-29 | End: 2025-06-05

## 2025-05-29 RX ORDER — DOCUSATE SODIUM 100 MG/1
100 CAPSULE, LIQUID FILLED ORAL 2 TIMES DAILY PRN
Qty: 14 CAPSULE | Refills: 0 | Status: SHIPPED | OUTPATIENT
Start: 2025-05-29 | End: 2025-06-05

## 2025-05-29 RX ORDER — HYDRALAZINE HYDROCHLORIDE 20 MG/ML
5 INJECTION INTRAMUSCULAR; INTRAVENOUS EVERY 30 MIN PRN
Status: DISCONTINUED | OUTPATIENT
Start: 2025-05-29 | End: 2025-05-29 | Stop reason: HOSPADM

## 2025-05-29 RX ORDER — DEXAMETHASONE SODIUM PHOSPHATE 10 MG/ML
INJECTION INTRAMUSCULAR; INTRAVENOUS AS NEEDED
Status: DISCONTINUED | OUTPATIENT
Start: 2025-05-29 | End: 2025-05-29

## 2025-05-29 RX ORDER — SODIUM CHLORIDE, SODIUM LACTATE, POTASSIUM CHLORIDE, CALCIUM CHLORIDE 600; 310; 30; 20 MG/100ML; MG/100ML; MG/100ML; MG/100ML
100 INJECTION, SOLUTION INTRAVENOUS CONTINUOUS
Status: DISCONTINUED | OUTPATIENT
Start: 2025-05-29 | End: 2025-05-29 | Stop reason: HOSPADM

## 2025-05-29 RX ORDER — GLYCOPYRROLATE 0.2 MG/ML
INJECTION INTRAMUSCULAR; INTRAVENOUS AS NEEDED
Status: DISCONTINUED | OUTPATIENT
Start: 2025-05-29 | End: 2025-05-29

## 2025-05-29 RX ORDER — CLONIDINE 100 UG/ML
INJECTION, SOLUTION EPIDURAL AS NEEDED
Status: DISCONTINUED | OUTPATIENT
Start: 2025-05-29 | End: 2025-05-29

## 2025-05-29 RX ORDER — CEFAZOLIN SODIUM 2 G/100ML
INJECTION, SOLUTION INTRAVENOUS AS NEEDED
Status: DISCONTINUED | OUTPATIENT
Start: 2025-05-29 | End: 2025-05-29

## 2025-05-29 RX ORDER — BUPIVACAINE HYDROCHLORIDE 5 MG/ML
INJECTION, SOLUTION EPIDURAL; INTRACAUDAL; PERINEURAL AS NEEDED
Status: DISCONTINUED | OUTPATIENT
Start: 2025-05-29 | End: 2025-05-29

## 2025-05-29 RX ORDER — SODIUM CHLORIDE, SODIUM LACTATE, POTASSIUM CHLORIDE, CALCIUM CHLORIDE 600; 310; 30; 20 MG/100ML; MG/100ML; MG/100ML; MG/100ML
INJECTION, SOLUTION INTRAVENOUS CONTINUOUS PRN
Status: DISCONTINUED | OUTPATIENT
Start: 2025-05-29 | End: 2025-05-29

## 2025-05-29 RX ORDER — PHENYLEPHRINE 10 MG/250 ML(40 MCG/ML)IN 0.9 % SOD.CHLORIDE INTRAVENOUS
CONTINUOUS PRN
Status: DISCONTINUED | OUTPATIENT
Start: 2025-05-29 | End: 2025-05-29

## 2025-05-29 RX ORDER — DIPHENHYDRAMINE HYDROCHLORIDE 50 MG/ML
12.5 INJECTION, SOLUTION INTRAMUSCULAR; INTRAVENOUS ONCE AS NEEDED
Status: DISCONTINUED | OUTPATIENT
Start: 2025-05-29 | End: 2025-05-29 | Stop reason: HOSPADM

## 2025-05-29 RX ORDER — LABETALOL HYDROCHLORIDE 5 MG/ML
5 INJECTION, SOLUTION INTRAVENOUS
Status: DISCONTINUED | OUTPATIENT
Start: 2025-05-29 | End: 2025-05-29 | Stop reason: HOSPADM

## 2025-05-29 RX ORDER — BUPIVACAINE HYDROCHLORIDE 2.5 MG/ML
INJECTION, SOLUTION EPIDURAL; INFILTRATION; INTRACAUDAL; PERINEURAL AS NEEDED
Status: DISCONTINUED | OUTPATIENT
Start: 2025-05-29 | End: 2025-05-29

## 2025-05-29 RX ORDER — ALBUTEROL SULFATE 0.83 MG/ML
2.5 SOLUTION RESPIRATORY (INHALATION)
Status: DISCONTINUED | OUTPATIENT
Start: 2025-05-29 | End: 2025-05-29 | Stop reason: HOSPADM

## 2025-05-29 RX ORDER — HYDROMORPHONE HYDROCHLORIDE 1 MG/ML
1 INJECTION, SOLUTION INTRAMUSCULAR; INTRAVENOUS; SUBCUTANEOUS EVERY 5 MIN PRN
Status: DISCONTINUED | OUTPATIENT
Start: 2025-05-29 | End: 2025-05-29 | Stop reason: HOSPADM

## 2025-05-29 RX ORDER — OXYCODONE HYDROCHLORIDE 5 MG/1
5 TABLET ORAL EVERY 4 HOURS PRN
Qty: 30 TABLET | Refills: 0 | Status: SHIPPED | OUTPATIENT
Start: 2025-05-29 | End: 2025-06-05 | Stop reason: ALTCHOICE

## 2025-05-29 RX ORDER — ONDANSETRON HYDROCHLORIDE 2 MG/ML
INJECTION, SOLUTION INTRAVENOUS AS NEEDED
Status: DISCONTINUED | OUTPATIENT
Start: 2025-05-29 | End: 2025-05-29

## 2025-05-29 RX ORDER — MIDAZOLAM HYDROCHLORIDE 1 MG/ML
INJECTION, SOLUTION INTRAMUSCULAR; INTRAVENOUS AS NEEDED
Status: DISCONTINUED | OUTPATIENT
Start: 2025-05-29 | End: 2025-05-29

## 2025-05-29 RX ORDER — PROPOFOL 10 MG/ML
INJECTION, EMULSION INTRAVENOUS AS NEEDED
Status: DISCONTINUED | OUTPATIENT
Start: 2025-05-29 | End: 2025-05-29

## 2025-05-29 RX ORDER — HYDROCODONE BITARTRATE AND ACETAMINOPHEN 5; 325 MG/1; MG/1
1 TABLET ORAL EVERY 4 HOURS PRN
Status: DISCONTINUED | OUTPATIENT
Start: 2025-05-29 | End: 2025-05-29 | Stop reason: HOSPADM

## 2025-05-29 RX ORDER — FENTANYL CITRATE 50 UG/ML
INJECTION, SOLUTION INTRAMUSCULAR; INTRAVENOUS AS NEEDED
Status: DISCONTINUED | OUTPATIENT
Start: 2025-05-29 | End: 2025-05-29

## 2025-05-29 RX ORDER — LIDOCAINE HYDROCHLORIDE 20 MG/ML
INJECTION, SOLUTION EPIDURAL; INFILTRATION; INTRACAUDAL; PERINEURAL AS NEEDED
Status: DISCONTINUED | OUTPATIENT
Start: 2025-05-29 | End: 2025-05-29

## 2025-05-29 RX ORDER — ROCURONIUM BROMIDE 50 MG/5 ML
SYRINGE (ML) INTRAVENOUS AS NEEDED
Status: DISCONTINUED | OUTPATIENT
Start: 2025-05-29 | End: 2025-05-29

## 2025-05-29 RX ORDER — METOCLOPRAMIDE HYDROCHLORIDE 5 MG/ML
10 INJECTION INTRAMUSCULAR; INTRAVENOUS ONCE AS NEEDED
Status: DISCONTINUED | OUTPATIENT
Start: 2025-05-29 | End: 2025-05-29 | Stop reason: HOSPADM

## 2025-05-29 RX ADMIN — MIDAZOLAM 2 MG: 1 INJECTION INTRAMUSCULAR; INTRAVENOUS at 07:33

## 2025-05-29 RX ADMIN — Medication 50 MG: at 07:58

## 2025-05-29 RX ADMIN — Medication 20 MG: at 08:54

## 2025-05-29 RX ADMIN — FENTANYL CITRATE 100 MCG: 50 INJECTION, SOLUTION INTRAMUSCULAR; INTRAVENOUS at 07:58

## 2025-05-29 RX ADMIN — LIDOCAINE HYDROCHLORIDE 100 MG: 20 INJECTION, SOLUTION EPIDURAL; INFILTRATION; INTRACAUDAL; PERINEURAL at 07:58

## 2025-05-29 RX ADMIN — SODIUM CHLORIDE, POTASSIUM CHLORIDE, SODIUM LACTATE AND CALCIUM CHLORIDE: 600; 310; 30; 20 INJECTION, SOLUTION INTRAVENOUS at 07:36

## 2025-05-29 RX ADMIN — BUPIVACAINE HYDROCHLORIDE 8 ML: 5 INJECTION, SOLUTION EPIDURAL; INTRACAUDAL; PERINEURAL at 07:33

## 2025-05-29 RX ADMIN — DEXAMETHASONE SODIUM PHOSPHATE 10 MG: 10 INJECTION INTRAMUSCULAR; INTRAVENOUS at 07:33

## 2025-05-29 RX ADMIN — PROPOFOL 200 MG: 10 INJECTION, EMULSION INTRAVENOUS at 07:58

## 2025-05-29 RX ADMIN — SUGAMMADEX 200 MG: 100 INJECTION, SOLUTION INTRAVENOUS at 10:13

## 2025-05-29 RX ADMIN — BUPIVACAINE HYDROCHLORIDE 8 ML: 2.5 INJECTION, SOLUTION EPIDURAL; INFILTRATION; INTRACAUDAL; PERINEURAL at 07:33

## 2025-05-29 RX ADMIN — Medication 0.2 MCG/KG/MIN: at 08:11

## 2025-05-29 RX ADMIN — FENTANYL CITRATE 25 MCG: 50 INJECTION, SOLUTION INTRAMUSCULAR; INTRAVENOUS at 09:20

## 2025-05-29 RX ADMIN — GLYCOPYRROLATE 0.2 MG: 0.2 INJECTION, SOLUTION INTRAMUSCULAR; INTRAVENOUS at 08:15

## 2025-05-29 RX ADMIN — Medication 20 MG: at 08:26

## 2025-05-29 RX ADMIN — CEFAZOLIN SODIUM 3 G: 2 INJECTION, SOLUTION INTRAVENOUS at 08:06

## 2025-05-29 RX ADMIN — ONDANSETRON 4 MG: 2 INJECTION INTRAMUSCULAR; INTRAVENOUS at 09:57

## 2025-05-29 RX ADMIN — Medication 10 MG: at 09:33

## 2025-05-29 RX ADMIN — GLYCOPYRROLATE 0.2 MG: 0.2 INJECTION, SOLUTION INTRAMUSCULAR; INTRAVENOUS at 08:28

## 2025-05-29 RX ADMIN — DEXAMETHASONE SODIUM PHOSPHATE 4 MG: 4 INJECTION, SOLUTION INTRAMUSCULAR; INTRAVENOUS at 08:06

## 2025-05-29 RX ADMIN — CLONIDINE HYDROCHLORIDE 100 MCG: 100 INJECTION, SOLUTION INTRAVENOUS at 07:33

## 2025-05-29 RX ADMIN — FENTANYL CITRATE 25 MCG: 50 INJECTION, SOLUTION INTRAMUSCULAR; INTRAVENOUS at 10:06

## 2025-05-29 SDOH — HEALTH STABILITY: MENTAL HEALTH: CURRENT SMOKER: 0

## 2025-05-29 ASSESSMENT — PAIN - FUNCTIONAL ASSESSMENT
PAIN_FUNCTIONAL_ASSESSMENT: 0-10

## 2025-05-29 ASSESSMENT — PAIN SCALES - GENERAL
PAINLEVEL_OUTOF10: 0 - NO PAIN

## 2025-05-29 ASSESSMENT — COLUMBIA-SUICIDE SEVERITY RATING SCALE - C-SSRS
6. HAVE YOU EVER DONE ANYTHING, STARTED TO DO ANYTHING, OR PREPARED TO DO ANYTHING TO END YOUR LIFE?: NO
1. IN THE PAST MONTH, HAVE YOU WISHED YOU WERE DEAD OR WISHED YOU COULD GO TO SLEEP AND NOT WAKE UP?: NO

## 2025-05-29 NOTE — ANESTHESIA PREPROCEDURE EVALUATION
Patient: Jacqueline Flores    Procedure Information       Anesthesia Start Date/Time: 05/29/25 0750    Procedure: ARTHROPLASTY, SHOULDER, TOTAL (Right: Shoulder)    Location: [facility] / [facility]    Surgeons: Tip Jackman MD MPH            Relevant Problems   Cardiac   (+) Accelerated hypertension   (+) Benign essential hypertension   (+) Hyperlipidemia      GI   (+) Chronic GERD      Endocrine   (+) Morbid obesity (Multi)      Musculoskeletal   (+) Osteoarthritis of glenohumeral joint, right      HEENT   (+) Seasonal allergies       Clinical information reviewed:   Tobacco  Allergies  Meds   Med Hx  Surg Hx  OB Status  Fam Hx  Soc   Hx        NPO Detail:  NPO/Void Status  Carbohydrate Drink Given Prior to Surgery? : N  Date of Last Liquid: 05/27/25  Time of Last Liquid: 2100  Date of Last Solid: 05/29/25  Time of Last Solid: 0400  Last Intake Type: Clear fluids  Time of Last Void: 0719         Physical Exam    Airway  Mallampati: II  TM distance: >3 FB  Neck ROM: full  Mouth opening: 3 or more finger widths     Cardiovascular   Rhythm: regular  Rate: normal     Dental - normal exam     Pulmonary Breath sounds clear to auscultation     Abdominal - normal exam           Anesthesia Plan    History of general anesthesia?: yes  History of complications of general anesthesia?: no    ASA 3     general and regional     The patient is not a current smoker.    intravenous induction   Anesthetic plan and risks discussed with patient.    Plan discussed with CAA.

## 2025-05-29 NOTE — ANESTHESIA PROCEDURE NOTES
Airway  Date/Time: 5/29/2025 8:02 AM  Reason: elective    Airway not difficult    Staffing  Performed: JEANNE   Authorized by: Tolu Arguello MD    Performed by: JEANNE Martinez  Patient location during procedure: OR    Patient Condition  Indications for airway management: anesthesia  Patient position: sniffing    Final Airway Details   Preoxygenated: yes  Final airway type: endotracheal airway  Successful airway: ETT   Successful intubation technique: direct laryngoscopy  Adjuncts used in placement: intubating stylet  Endotracheal tube insertion site: oral  Blade: Marcela  Blade size: #3  ETT size (mm): 7.0  Cormack-Lehane Classification: grade I - full view of glottis  Placement verified by: chest auscultation and capnometry   Measured from: lips  ETT to lips (cm): 23  Number of attempts at approach: 1

## 2025-05-29 NOTE — ANESTHESIA PROCEDURE NOTES
Peripheral Block    Patient location during procedure: pre-op  Medication administered at: 5/29/2025 7:33 AM  End time: 5/29/2025 7:36 AM  Reason for block: at surgeon's request and post-op pain management  Staffing  Performed: attending   Authorized by: Tolu Arguello MD    Performed by: Tolu Arguello MD  Preanesthetic Checklist  Completed: patient identified, IV checked, site marked, risks and benefits discussed, surgical consent, monitors and equipment checked, pre-op evaluation and timeout performed   Timeout performed at: 5/29/2025 7:31 AM  Peripheral Block  Patient position: laying flat  Prep: ChloraPrep  Patient monitoring: continuous pulse ox  Block type: interscalene  Laterality: right  Injection technique: single-shot  Local infiltration: bupivicaine  Infiltration strength: 0.4 %  Dose: 16 mL  Needle  Needle gauge: 22 G  Needle length: 8 cm  Needle localization: anatomical landmarks and ultrasound guidance  Assessment  Injection assessment: negative aspiration for heme, no paresthesia on injection, incremental injection and local visualized surrounding nerve on ultrasound  Paresthesia pain: none  Heart rate change: no  Slow fractionated injection: yes  Additional Notes  Risks and benefits of the procedure have been extensively discussed. Patient seems to understand and is willing to proceed.   Ultrasound probe initially at the level of clavicle, then slid cephalad approximately 5-6 cm until proper visualization of C5 and C6 has been established.  Lidocaine 2% to skin. [employer] SonoBlock 80 mm needle introduced and advanced in-plain. C5 approached at 7 o'clock.  A total of 16 mL of 0.375% Bupivacaine has been used.   10 mg of PF Dexamethasone and 100 mcg Clonidine were mixed with the local anesthetic.   Tip of the needle remained visible throughout the entire procedure. Appropriate spread of the local anesthetic mixture has been observed and documented. No immediate complications.

## 2025-05-29 NOTE — H&P
History Of Present Illness  Jacqueline Flores is a 50 y.o. patient. 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  She states that in the past, she has done some physical therapy to strengthen and condition her right shoulder.  She states that the pain and weakness has gotten quite bad to the point that she is waking up at night with pain.  She has not had any prior surgery or injection to the shoulder.  She is additionally having bilateral knee pain and is working on getting her BMI below 40 for total knee arthroplasty.  She has lost 120 pounds over the past year with dietary modifications and GLP-1 agents.      Past Medical History  She has a past medical history of Arthritis, Arthritis of knee (04/05/2023), Benign essential hypertension (04/05/2023), Chronic GERD (04/05/2023), Chronic pain of right knee (04/05/2023), History of stroke (06/09/2014), Hyperlipidemia, Morbid obesity (Multi) (04/05/2023), Prediabetes (04/05/2023), Primary osteoarthritis of right knee (04/05/2023), and Vitamin D deficiency (04/05/2023).    Surgical History  She has a past surgical history that includes Other surgical history (06/08/2021); Tonsillectomy; Adenoidectomy; Other surgical history; Shoulder surgery (Right, 05/2025); and Nose surgery (N/A, 2013).     Social History  She reports that she quit smoking about 26 years ago. Her smoking use included cigarettes. She has never been exposed to tobacco smoke. She has never used smokeless tobacco. She reports current alcohol use of about 2.0 standard drinks of alcohol per week. She reports that she does not use drugs.    Family History  Family History[1]     Allergies  Adhesive tape-silicones, Pollens extract, and Seroquel [quetiapine]    Review of Systems  As per HPI.     Physical Exam  This is a well-appearing patient in no acute distress.    There is no tenderness to palpation along the SC joint, AC  "joint, clavicle, acromion, or spine of the scapula.  There is tenderness along the proximal aspect of the biceps tendon.  Active range of motion: forward flexion 80 degrees, abduction 80 degrees, external rotation 30 degrees, internal rotation to sacrum.  Strength: 5/5 to the supraspinatus, infraspinatus, subscapularis.  Negative Hornblower's.  Stability: Anterior/Posterior load and shift stable  Negative Speed's and Yergason's.  Positive Mobile's.  Negative Neer and De La Fuente.  The patient is firing the AIN/PIN/median/radial/ulnar/axillary distributions.  The patient is sensate to light touch in the median/radial/ulnar/axillary distributions.  2+ radial pulse.     Last Recorded Vitals  Blood pressure 139/82, pulse 73, temperature 36.7 °C (98.1 °F), temperature source Temporal, resp. rate 18, height 1.651 m (5' 5\"), weight 120 kg (265 lb), SpO2 96%.    Relevant Results      Scheduled medications  Scheduled Medications[2]  Continuous medications  Continuous Medications[3]  PRN medications  PRN Medications[4]  Results for orders placed or performed during the hospital encounter of 05/29/25 (from the past 24 hours)   hCG, Urine, Qualitative   Result Value Ref Range    HCG, Urine NEGATIVE NEGATIVE       Assessment/Plan   Assessment & Plan  Osteoarthritis of glenohumeral joint, right      50-year-old female right-hand-dominant with end-stage right shoulder glenohumeral osteoarthritis.  She has done physical therapy in the past and is at a point that she is having nightly pain.  She is additionally having pain with her activities of daily living.  Her rotator cuff appears intact on exam but is having pain related weakness and loss of motion.  We had a lengthy discussion regarding operative and nonoperative treatment.  She is at a point where she is pretty miserable and would like to proceed with operative management in the form of anatomic total shoulder arthroplasty.  We discussed the risk, benefits, and alternatives of " operative management.  Risks include but are not limited to risk of anesthesia, bleeding, blood loss, infection, damage to surrounding structures.  We discussed that given her BMI over 40, she may be at high risk. She did lose 120 pounds over the last year.  We also discussed that given her young age, she may likely require revision surgery down the line or even conversion to reverse total shoulder arthroplasty if her cuff begins to breakdown. We will move forward with surgery today.       I spent 10 minutes in the professional and overall care of this patient.      Tip Jackman MD MPH         [1]   Family History  Problem Relation Name Age of Onset    Leukemia Mother Nicole     Cancer Mother Nicole     Hypertension Mother Nicole     Hypertension Father      Thyroid disease Maternal Grandmother Tiffany     Cerebral aneurysm Maternal Grandfather      Esophageal cancer Paternal Grandmother Shawanda     Cancer Paternal Grandmother Shawanda     Diabetes Paternal Grandmother Shawanda     Cancer Father's Sister Kristy     Cancer Father's Sister Kristy     Leukemia Other Niece     Brain cancer Other Aunt    [2] [3] [4]

## 2025-05-29 NOTE — ANESTHESIA POSTPROCEDURE EVALUATION
Patient: Jacqueline Flores    Procedure Summary       Date: 05/29/25 Room / Location: [facility] OR 02 / Virtual [facility] OR    Anesthesia Start: 0750 Anesthesia Stop: 1031    Procedure: ARTHROPLASTY, SHOULDER, TOTAL (Right: Shoulder) Diagnosis:       Osteoarthritis of glenohumeral joint, right      (Osteoarthritis of glenohumeral joint, right [M19.011])    Surgeons: Tip Jackman MD MPH Responsible Provider: Tolu Arguello MD    Anesthesia Type: general, regional ASA Status: 3            Anesthesia Type: general, regional    Vitals Value Taken Time   /67 05/29/25 11:30   Temp 36 °C (96.8 °F) 05/29/25 11:30   Pulse 70 05/29/25 11:39   Resp 8 05/29/25 11:39   SpO2 96 % 05/29/25 11:39   Vitals shown include unfiled device data.    Anesthesia Post Evaluation    Patient location during evaluation: PACU  Patient participation: complete - patient participated  Level of consciousness: awake and alert  Pain management: satisfactory to patient  Multimodal analgesia pain management approach  Airway patency: patent  Cardiovascular status: hemodynamically stable  Respiratory status: spontaneous ventilation and room air  Hydration status: euvolemic  Postoperative Nausea and Vomiting: none        No notable events documented.

## 2025-05-29 NOTE — DISCHARGE INSTRUCTIONS
Post Op Instructions: Upper Extremity  Shahab Jackman M.D., M.P.H.  Office Phone: [phone]  After Hours Line: [phone]      First PT Appointment:   As scheduled.  First Post Op Appointment:   6/17/2025 130PM, [address]  Please make an appointment with a physical therapist of your choice ASAP, preferably starting the day after surgery. We recommend PT 2-3x/week for 6-12 weeks after surgery.     If you have any questions, please read this information in its entirety, and then call with any questions.        MEDICATIONS PRESCRIBED FOR AFTER SURGERY:  Your preferred pharmacy on file is where your medications have been ePrescribed: Please  ASAP  FOR PAIN RELIEF (Narcotics):    You have been provided pain medication after your surgery, please take as directed. Wean off of narcotics as soon as symptoms allow. Take with food. Notify office if nausea, vomiting, headaches, or rash occurs. Other side effects include; drowsiness and constipation.    Use caution when taking TYLENOL or other acetaminophen products while taking Percocet, Norco, or Lorcet as these medications already contain acetaminophen. Do NOT exceed more than 3000mg of Tylenol per day.    We do NOT recommend ibuprofen, Advil, Aleve, Motrin, or NSAID products for 6wks after surgery, as these can delay healing.    You should not drive while taking pain medications as they delay your responses.    Narcotics are addictive and have a high abuse and overdose potential. It is extremely important to take these as directed and not to mix with alcohol or other forms of medication unless approved by the medical team. Please keep all prescribed narcotics LOCKED and away from children. Lastly, please properly dispose of leftover narcotics. Contact your local police department for more info.   o OXYCODONE 5 mg: this is a short-acting medication for pain. You should take 1 or 2 tablets every 4 to 6 hours AS NEEDED. If you are not  in pain, you should not take this medication. Try to wean down your use of this drug as soon as symptoms allow. If 2 tablets every 4 hours are not relieving all your pain please call our office or the MD on Call.     FOR NAUSEA:   o ZOFRAN (Odansteron) 4mg Tablet - this medication is for nausea or vomiting. Place 1 tablet under the tongue every 8 hours as needed for nausea. If you are not nauseous, you do not need to take this medication. Please call our office if you still experience nausea despite taking this medication.    FOR ANTI-COAGULATION (Blood Thinners):   You have been prescribed an anticoagulation medication, to be taken after surgery. This medication is taken to prevent a blood clot from developing in your leg, which is a possible complication after any surgery. This medication is required after all surgeries. You must finish the entire prescription of anticoagulation medication, no matter what type of procedure you had.   o ASPIRIN 325 mg: This is a mild blood thinner. Please take 1 tablet every day as instructed weeks: starting the day after surgery, no matter what kind of procedure you had. Finish the entire prescription bottle, even if you are feeling better.     OTHER MEDICATIONS TO CONSIDER:   o TYLENOL: You can buy this Over The Counter. Some pain medications contain Tylenol, including Norco, Lorcet, and Percocet. Oxycodone and Dilaudid DO NOT contain Tylenol, and it is recommended to add in Tylenol for additional pain control if needed. This can be taken as 325-600mg every 4 hours. The maximum dose for Tylenol per day is 3000mg.   o MIRALAX, COLACE, SENNA OR DOCUSATE: These are over-the-counter mild laxatives and stool softeners for prevention of constipation. We suggest beginning these the day after surgery if you are taking narcotics. Please call the office if you have not had a bowel movement for three days after your surgery.   o BENADRYL: Itching can be common when taking narcotics. Take  this as needed for any itching symptoms. Can be found overthe-counter.    DRESSING/BANDAGE CHANGES:   You may keep your surgical dressing on for 10-14 days postoperatively.  You can shower over your dressing on postoperative day 2.    OTHER GENERAL SURGERY INFORMATION  ICE: Swelling and bruising is normal after surgery because fluids are used during surgery. Continuous icing will help to decrease swelling and pain. It is best to ice at least 5-6 times a day for 20-30 minutes. It is very important to always have a protective  between the ice and your skin. You may use ice bags, frozen wraps, frozen peas or a NICE or Game Ready unit (an ice/compression machine). If you have received a NICE machine and have any questions regarding its use, please call the number provided with the machine.    DRIVING: Please do not drive until you are evaluated in the office after surgery. You are considered an impaired  following surgery, and if you choose to drive, your insurance may not cover any accidents that occur.     PHYSICAL THERAPY: This is dependent on your injury and specific procedure. You will be given specific protocol after your surgery.    ACTIVITY RESTRICTIONS/BRACE/SLING: This is dependent on your injury and specific procedure. You may be required to use a brace or sling. The type of surgery you had will dictate how long to wear your brace/sling. Your PT protocol will outline any restrictions you may have with lifting and range of motion. If you are placed in a sling/brace, it is extremely important to use as directed and make sure you always have the sling on when ambulating (walking). It is important that you follow all instructions regarding activity restrictions as they are intended to promote healing and prevent complications. You may take the brace/sling off if you need to change the bandages, change clothes, or shower (be extra cautious!), or if you are sitting. We recommend wearing the brace  even while you are sleeping unless told otherwise y our team.     SIGNS AND SYMPTOMS OF COMPLICATIONS: Although complications are rare the following are a list of concerns you should be aware of:    Infection - increased pain not relieved with medication, fever, chills, redness, swelling or drainage from incision.    Blood Clot - swelling, tenderness, or calf pain to touch or when you move your ankle up and down, shortness of breath and chest pain.    REASONS TO CALL:   ? Fever, chills or sweats   ? Redness, swelling or warmth around the incisions, non-clear drainage from the incision or increased pain in or around the incision   ? Calf swelling, redness, pain or warmth   ? Chest pain, difficulty breathing, or cough   ? Inability to have a bowel movement after 3 days

## 2025-05-29 NOTE — BRIEF OP NOTE
Date: 2025  OR Location: [facility] OR    Name: Jacqueline Flores, : 1975, Age: 50 y.o., MRN: [id number], Sex: female    Diagnosis  Pre-op Diagnosis      * Osteoarthritis of glenohumeral joint, right [M19.011] Post-op Diagnosis     * Osteoarthritis of glenohumeral joint, right [M19.011]     Procedures  ARTHROPLASTY, SHOULDER, TOTAL  27885 - [place] ARTHROPLASTY GLENOHUMERAL JOINT TOTAL SHOULDER      Surgeons      * Tip Jackman - Primary    Resident/Fellow/Other Assistant:  Surgeons and Role:  * No surgeons found with a matching role *    Staff:   Circulator: Snehal  Scrub Person: Reshma  Scrub Person: Regan  Scrub Person: Tessie    Anesthesia Staff: Anesthesiologist: Tolu Arguello MD  C-AA: JEANNE Martinez    Procedure Summary  Anesthesia: Regional, General  ASA: III  Estimated Blood Loss: 50mL  Intra-op Medications:   Administrations occurring from 0730 to 1050 on 25:   Medication Name Total Dose   ceFAZolin (Ancef) IV 2 g in 100 mL dextrose (iso) - premix 3 g   dexAMETHasone (Decadron) 4 mg/mL IV Syringe 2 mL 4 mg   fentaNYL (Sublimaze) injection 50 mcg/mL 150 mcg   glycopyrrolate (Robinul) injection 0.4 mg   LR infusion Cannot be calculated   lidocaine PF (Xylocaine-MPF) local injection 2 % 100 mg   midazolam (Versed) injection 1 mg/mL 2 mg   ondansetron (Zofran) 2 mg/mL injection 4 mg   phenylephrine (Cole-Synephrine) 10 mg/250 mL NS (40 mcg/mL) infusion 0.36 mg   propofol (Diprivan) injection 10 mg/mL 200 mg   rocuronium (Zemuron) 50 mg/5 mL prefilled syringe 100 mg   sugammadex (Bridion) 200 mg/2 mL injection 200 mg              Anesthesia Record               Intraprocedure I/O Totals          Intake    Phenylephrine Drip 0.00 mL    The total shown is the total volume documented since Anesthesia Start was filed.    ceFAZolin 2 gram/100 mL 150.00 mL    Total Intake 150 mL          Specimen: No specimens collected       Findings: see op report    Complications:  None; patient tolerated the procedure well.      Disposition: PACU - hemodynamically stable.  Condition: stable  Specimens Collected: No specimens collected  Attending Attestation: I was present and scrubbed for the key portions of the procedure.    Tip Jackman  Phone Number: [phone]

## 2025-06-01 NOTE — OP NOTE
ARTHROPLASTY, SHOULDER, TOTAL (R) Operative Note     Date: 2025  OR Location: [facility] OR    Name: Jacqueline Flores, : 1975, Age: 50 y.o., MRN: [id number], Sex: female    Diagnosis  Pre-op Diagnosis      * Osteoarthritis of glenohumeral joint, right [M19.011] Post-op Diagnosis     * Osteoarthritis of glenohumeral joint, right [M19.011]     Procedures  ARTHROPLASTY, SHOULDER, TOTAL  29209 - [place] ARTHROPLASTY GLENOHUMERAL JOINT TOTAL SHOULDER    81984 open biceps tenodesis    Surgeons      * Tip Jackman - Primary    Resident/Fellow/Other Assistant:  Surgeons and Role:  * No surgeons found with a matching role *    Staff:   Circulator: Snehal  Scrub Person: Reshma  Scrub Person: Regan Segundoub Person: Tessie    Anesthesia Staff: Anesthesiologist: Tolu Arguello MD  C-AA: JEANNE Martinez    Procedure Summary  Anesthesia: Regional, General  ASA: III  Estimated Blood Loss: 50 mL  Intra-op Medications:   Administrations occurring from 0730 to 1050 on 25:   Medication Name Total Dose   bupivacaine PF (Marcaine) 0.5 % 8 mL   bupivacaine PF (Marcaine) 0.25 % 8 mL   ceFAZolin (Ancef) IV 2 g in 100 mL dextrose (iso) - premix 3 g   cloNIDine injection 100 mcg/mL 100 mcg   dexAMETHasone (Decadron) 4 mg/mL IV Syringe 2 mL 4 mg   dexAMETHasone PF 10 mg/mL 10 mg   fentaNYL (Sublimaze) injection 50 mcg/mL 150 mcg   glycopyrrolate (Robinul) injection 0.4 mg   LR infusion Cannot be calculated   lidocaine PF (Xylocaine-MPF) local injection 2 % 100 mg   midazolam (Versed) injection 1 mg/mL 2 mg   ondansetron (Zofran) 2 mg/mL injection 4 mg   phenylephrine (Cole-Synephrine) 10 mg/250 mL NS (40 mcg/mL) infusion 0.36 mg   propofol (Diprivan) injection 10 mg/mL 200 mg   rocuronium (Zemuron) 50 mg/5 mL prefilled syringe 100 mg   sugammadex (Bridion) 200 mg/2 mL injection 200 mg              Anesthesia Record               Intraprocedure I/O Totals          Intake    Phenylephrine Drip 0.00 mL    The total shown is the total volume  documented since Anesthesia Start was filed.    ceFAZolin 2 gram/100 mL 150.00 mL    Total Intake 150 mL          Specimen: No specimens collected              Drains and/or Catheters: * None in log *    Tourniquet Times:         Implants:  Implants       Type Name Action Serial No.      Joint GUIDE PIN, 3 X 75MM, STERILE - [id number] Used, Not Implanted      Joint Shoulder 3MM X 100MM PERFORM HUMERAL GUIDE PIN Used, Not Implanted NA     Joint AEQUALIS PERFORM+ GUIDE PIN, 2.5 X 220MM - [id number] Used, Not Implanted      Implant CEMENT, BONE, SIMPLEX HV FULL DOSE  40 GM - [id number] Implanted      Joint Shoulder 25DEG AEQUALIS PERFORM PLUS CORTILOC AUGMENTED GLENOID, RIGHT, SMALL Implanted NA     Joint Shoulder OFFSET CENTERED TORNIER PERFORM HUMERAL SYSTEM HUMERAL HEAD  [id number] Implanted NA     Joint Shoulder 15MM X 41MM PERFORM HUMERAL HEAD, COCR Implanted NA     Joint Shoulder SIZE 2+, AEQUALIS PERFORM HUMERAL STEM, PLUS, LONG, [id number] + [id number] Implanted NA            Indications: Jacqueline Flores is an 50 y.o. female who is having surgery for Osteoarthritis of glenohumeral joint, right [M19.011]. 50-year-old female right-hand-dominant with end-stage right shoulder glenohumeral osteoarthritis.  She has done physical therapy in the past and is at a point that she is having nightly pain.  She is additionally having pain with her activities of daily living.  Her rotator cuff appears intact on exam but is having pain related weakness and loss of motion.  We had a lengthy discussion regarding operative and nonoperative treatment.  She is at a point where she is pretty miserable and would like to proceed with operative management in the form of anatomic total shoulder arthroplasty.  We discussed the risk, benefits, and alternatives of operative management.  Risks include but are not limited to risk of anesthesia, bleeding, blood loss, infection, damage to surrounding structures.  We discussed that given her BMI over  40, she may be at high risk. She did lose  120 pounds over the last year.  We also discussed that given her young age, she may likely require revision surgery down the line or even conversion to reverse total shoulder arthroplasty if her cuff begins to breakdown.  We discussed risk of the procedure including but not limited to infection, nerve damage, blood loss, persistent pain, instability, need for further surgery, blood clot, and death.  She is understand these risks and like to move forward with surgery.    The patient was seen in the preoperative area. The risks, benefits, complications, treatment options, non-operative alternatives, expected recovery and outcomes were discussed with the patient. The possibilities of reaction to medication, pulmonary aspiration, injury to surrounding structures, bleeding, recurrent infection, the need for additional procedures, failure to diagnose a condition, and creating a complication requiring transfusion or operation were discussed with the patient. The patient concurred with the proposed plan, giving informed consent.  The site of surgery was properly noted/marked if necessary per policy. The patient has been actively warmed in preoperative area. Preoperative antibiotics have been ordered and given within 1 hours of incision. Venous thrombosis prophylaxis have been ordered including bilateral sequential compression devices    Procedure Details:   PROCEDURE:  The patient was met in the holding area, limb was signed, and all questions were answered. Preoperative antibiotics were administered. An interscalene nerve block was placed by the anesthesia pain service using ultrasound guidance. The patient was then taken back to the main Operating Room and underwent a light general anesthetic without complication. The patient was prepped and draped in the usual sterile orthopedic fashion, in beach chair position with all bony prominences well padded. The head of the bed was  elevated about 45 degrees during the case. An arm giraldo was placed to secure the operative extremity. A surgical timeout was taken confirming correct patient, correct site and correct procedure.     APPROACH: At this point, I began my standard total shoulder approach. This was a deltopectoral approach just lateral to the coracoid and through the deltopectoral interval. I went through the deltopectoral interval, using both Metzenbaum scissors and a needle tip bovie to help maintain hemostasis. The cephalic vein was identified and retracted laterally to protect it throughout the case. I then identified the coracoid and conjoint tendon. The patient had a lot of scar tissue underneath the deltoid, indicating their rotator cuff arthropathy. I freed this up using a combination of a siddiqui elevator and moreland scissors to elevate the scar tissue off the humeral head. I then gently elevated the anterior conjoint tendon fascia to insert a Kolbel retractor just underneath the short head of the biceps. I next identified the subscapularis, which was intact. I was then able to identify the long head of the biceps tendon in the groove of the humerus, and started the biceps tenodesis.     BICEPS TENODESIS: Starting distally just superior to the pectoralis major, I used Moreland scissors to free up the biceps tendon proximally through the rotator interval of the shoulder and through the transverse humeral ligament, releasing the biceps tendon from the groove and going all the way to the base of the coracoid in the joint. I stitched the long head of the biceps with two #2 FiberWire sutures and tied it to the proximal portion of the pectoralis major, making sure to tie the tendon in its anatomic length-tension relationship. Once this was done, I was able to cut the tendon just proximal to its repaired position and retrieve the remaining tendon from the joint.     SUBSCAPULARIS MANAGEMENT: At this point I performed a subscapularis tenotomy. I  started laterally using electrocautery. As I approached inferior, I identified the axillary nerve and gently protected this throughout with digital palpation. I was able to put a stay suture, #2 ethibond x2 whipstitched, through the medial aspect of the subscapularis and continued the capsular work and released the capsule off the humerus with a key elevator, taking care to protect the axillary nerve at all times.     HUMERAL HEAD EXPOSURE: Using 2 pointed Hohmann retractors, I was able to expose the humeral head. I then externally rotated the humeral head and brought it out of the shoulder joint away from the glenoid anteriorly, fully isolating it. The humeral head was marked with a needle tip bovie to indicate the location of the anatomic cuff and surgical cut. I then used a 135 degree external cutting guide and did an approximately 20 degree retroversion cut with the sagittal saw. I also used an osteotome and ronjeur to make sure I had a very good, smooth cut and approximately 20 degrees of retroversion.     GLENOID EXPOSURE AND OPEN CAPSULAR RELEASE: Using a humeral head retractor and an anterior glenoid retractor, I completed the anterior release of both the subscapularis anterior capsule, anterior labrum, inferior and posterior labrum off the glenoid.  Care was taken to protect the axillary nerve with digital palpation at all times. I used Calderón scissors to release the capsule off the glenoid from approximately the 1 o'clock anterior to 9 o'clock position posterior.     GLENOID PREPARATION:   I then freehanded a guidepin bicortically through the glenoid and confirmed positioning.  Next, we utilized our preoperative plan to place a 25 degree cortiloc glenoid augment.  We utilized the augmenting reamer over the guidepin to ream the glenoid.  I then drilled the pegs using the appropriate drill. This was then copiously irrigated with pulse lavage.     GLENOID AND HUMERUS IMPLANT PLACEMENT:   Cement was mixed on the  back table and then only the pegged holes were utilized and packed using third generation cement techniques. I then tamped in the glenoid component and held it in position with pressure on it the whole time while the cement fully cured.  I then brought my attention back     To the humeral side.  After sequential reaming we ended with a 2+ long perform stem with a standard  and placed a 41 x 15 mm humeral head.  This was trialed and passed the 40-50-60 rule very well, 40 degrees IR with no anterior impingement, 50% bounceback posterior, and 60 degrees ER with no lift off. At this point, I removed the trial components from the humeral side, irrigated, and placed our final implant.  We achieved excellent press fit.     SUBSCAPULARIS REPAIR: The subscapularis when then repaired multiple figure-of-eight #2 Ethibond sutures bringing the medial tenotomy back to its lateral insertion onto the lesser tuberosity.  The shoulder was brought through a gentle range of internal/external rotation confirming robust repair of the subscapularis.     CLOSING: All wounds were copiously irrigated with pulse lavage. I closed in a layered fashion. I closed the deltopectoral interval with 0 vicryl. The deep dermal layer was closed with 2-0 vicryl and the skin was closed with a running 3-0 monocryl. The wound was dried and dermabond glue was placed over the incision. A sterile dressing was then applied. The patient was placed in a padded abduction sling, awakened from anesthesia without complication and taken to the PACU in stable condition.      Evidence of Infection: No   Complications:  None; patient tolerated the procedure well.    Disposition: PACU - hemodynamically stable.  Condition: stable                 Additional Details: None.    Attending Attestation: I was present and scrubbed for the key portions of the procedure.    Tip Jackman  Phone Number: [phone]

## 2025-06-05 DIAGNOSIS — Z96.611 STATUS POST TOTAL SHOULDER ARTHROPLASTY, RIGHT: ICD-10-CM

## 2025-06-05 RX ORDER — OXYCODONE HYDROCHLORIDE 5 MG/1
5 TABLET ORAL EVERY 4 HOURS PRN
Qty: 30 TABLET | Refills: 0 | Status: SHIPPED | OUTPATIENT
Start: 2025-06-05 | End: 2025-06-12

## 2025-06-09 ENCOUNTER — TELEPHONE (OUTPATIENT)
Dept: PRIMARY CARE | Facility: CLINIC | Age: 50
End: 2025-06-09
Payer: COMMERCIAL

## 2025-06-09 NOTE — TELEPHONE ENCOUNTER
I called the patient and she reports she is not feeling depressed but she is feeling down because she is recovering form her shoulder surgery and is unable to get out of the house and do things she enjoys. She denied and suicidal feelings and did not feel any follow up is necessary.     She did mention that since her surgery her feet have been swelling even though she has been elevating them while she is sitting. She states they have began to feel sore while walking. She was curious is she needed a higher dose of her hydrochlorothiazide. Please advise.

## 2025-06-09 NOTE — TELEPHONE ENCOUNTER
Registered nurse Marley Randall from the patients   [employer] called to state the patient did a [place] depression screening.    The patient had a POSITIVE depression screening and called to advise IB.    Any questions please call   [phone] Ext [phone]

## 2025-06-12 ENCOUNTER — TELEPHONE (OUTPATIENT)
Dept: SURGERY | Facility: CLINIC | Age: 50
End: 2025-06-12
Payer: COMMERCIAL

## 2025-06-17 ENCOUNTER — ANCILLARY PROCEDURE (OUTPATIENT)
Dept: ORTHOPEDIC SURGERY | Facility: CLINIC | Age: 50
End: 2025-06-17
Payer: COMMERCIAL

## 2025-06-17 ENCOUNTER — APPOINTMENT (OUTPATIENT)
Dept: ORTHOPEDIC SURGERY | Facility: CLINIC | Age: 50
End: 2025-06-17
Payer: COMMERCIAL

## 2025-06-17 DIAGNOSIS — Z96.611 STATUS POST TOTAL SHOULDER ARTHROPLASTY, RIGHT: ICD-10-CM

## 2025-06-17 PROCEDURE — 99024 POSTOP FOLLOW-UP VISIT: CPT | Performed by: STUDENT IN AN ORGANIZED HEALTH CARE EDUCATION/TRAINING PROGRAM

## 2025-06-23 ENCOUNTER — APPOINTMENT (OUTPATIENT)
Dept: SURGERY | Facility: CLINIC | Age: 50
End: 2025-06-23
Payer: COMMERCIAL

## 2025-06-23 NOTE — PROGRESS NOTES
Subjective   Date: 6/23/2025 Time: 11:31 AM  Name: Jacqueline Flores  MRN: [id number]    This is a 50 y.o. female with morbid obesity (There is no height or weight on file to calculate BMI.) who presents to clinic for consideration of bariatric surgery. she has attempted and failed multiple diet and exercise regimens for weight loss. Initial Onset of obesity was in childhood.  Their goal for surgery is to  be healthier , be candidate for another surgery , lose weight, and candidate for knee surgery. The patient has tried multiple diets to lose weight including Low Carb, Medications , Low Calorie, and Rybelsus. The patient was most successful with the Medications- Rybelsus. The most pounds lost on this diet were 115 lbs. The patient considers their dietary weakness to be emotional eating, late night snacking. The patient reports a  highest weight ever of 369 pounds and lowest weight ever of 250 poundsThe patient exercises 5 times /week 30-45 minutes/session Types of Exercise : walking    PREFERRED SURGICAL PROCEDURE: Patient would like to discuss WLS options    Comorbidities: anxiety, esophageal reflux, joint pain{NSAIDS (Optional):20564}, knee pain{NSAIDS (Optional):90212}, diabetes managed by oral medication , and hypertension controlled with oral meds, Prediabetes    {Menstrual History (Optional):19311}    PMH: Medical History[1]     PSH: Surgical History[2]     No personal/family hx of VTE.        GERD - Health Related Quality of Life Questionnaire (GERD- HRQL)    {GERD QOL PPI USE:87681}    How bad is the heartburn? {GERDQOL:94489}  Heartburn when lying down? {GERDQOL:06716}  Heartburn when standing up? {GERDQOL:89898}  Heartburn after meals? {GERDQOL:48818}  Does heartburn change your diet? {GERDQOL:01018}  Does heartburn wake you from sleep? {GERDQOL:33437}    Do you have difficulty swallowing? {GERDQOL:11480}  Do you have pain with swallowing? {GERDQOL:66642}  If you take medication, does this affect your daily  life? {GERDQOL:46697}    How bad is the regurgitation? {GERDQOL:67557}  Regurgitation when lying down? {GERDQOL:54982}  Regurgitation when standing up? {GERDQOL:11403}  Regurgitation after meals? {GERDQOL:24056}  Does regurgitation change your diet? {GERDQOL:90007}  Does regurgitation wake you from sleep? {GERDQOL:05012}    How satisfied are you with your present condition? {satisfaction GERD QOL:74021}    Total score (calculated by summing the individual scores of questions 1-15): ***   Greatest possible score 75 (worst symptoms).   Lowest possible score 0 (no symptoms).    Heartburn score (calculated by summing the individual scores of questions 1-6): ***   Worst heartburn symptoms: 30.   No heartburn symptoms: 0.   Score less than or equal to 12 with each individual question not exceeding 2 indicate heartburn elimination.    Regurgitation score (calculated by summing the individual scores of questions 10-15): ***   Worst regurgitation symptoms: 30.   No regurgitation symptoms: 0.   Score less than or equal to 12 with each individual question not exceeding 2 indicate regurgitation elimination.               FAMILY HISTORY:  Family History[3]     SOCIAL HISTORY:  Social History[4]    MEDICATIONS:  Prior to Admission Medications:  Medication Documentation Review Audit       Reviewed by Anaya Mims MA (Medical Assistant) on 06/17/25 at 1348      Medication Order Taking? Sig Documenting Provider Last Dose Status   aspirin 81 mg chewable tablet [id number] No Chew 1 tablet (81 mg) once daily. Historical Provider, MD Past Week Active   chlorhexidine (Peridex) 0.12 % solution [id number] No Swish and spit 15 ml for 2 doses, 15mL the night before surgery and 15 ml morning of surgery - swish for 30 seconds -DO NOT SWALLOW, SPIT OUT MARCOS Luke-CNP 5/29/2025 Morning Active   cholecalciferol (Vitamin D-3) 50 mcg (2,000 unit) capsule [id number] No Take 1 capsule (50 mcg) by mouth early in the morning.. With food  Uyen Rosenthal MD Past Week Active   cyanocobalamin (Vitamin B-12) 1,000 mcg tablet [id number] No Take 2 tablets (2,000 mcg) by mouth once daily. Uyen Rosenthal MD Past Week Active   LYSINE ORAL [id number] No Take 1 tablet by mouth once daily in the morning. Uyen Rosenthal MD Past Week Active   meloxicam (Mobic) 15 mg tablet [id number] No Take 1 tablet (15 mg) by mouth once daily. EUGENE Bo DNP Past Week Active   metoprolol tartrate (Lopressor) 100 mg tablet [id number] No Take 1 tablet (100 mg) by mouth every 12 hours. EUGENE Bo DNP 2025 Active   olmesartan-hydrochlorothiazide (Benicar HCT) 20-12.5 mg tablet [id number] No Take 1 tablet by mouth once daily. Evelin Robbins MD 2025 Active   oxyCODONE (Roxicodone) 5 mg immediate release tablet [id number]  Take 1 tablet (5 mg) by mouth every 4 hours if needed for severe pain (7 - 10) or moderate pain (4 - 6) for up to 7 days. Tip Jackman MD MPH   25 2359   pantoprazole (ProtoNix) 40 mg EC tablet [id number] No Take 1 tablet (40 mg) by mouth once daily. EUGENE Bo DNP 2025 Active   QUEtiapine (SEROquel) 25 mg tablet [id number] No Take 1 tablet (25 mg) by mouth once daily at bedtime.   Patient not taking: Reported on 5/15/2025    EUGENE Bo DNP Not Taking Active   semaglutide (Rybelsus) 14 mg tablet tablet [id number] No Take 1 tablet (14 mg) by mouth once daily. Evelin Robbins MD Past Week Active   traMADol (Ultram) 50 mg tablet [id number] No Take 1 tablet (50 mg) by mouth 3 times a day. EUGENE Bo DNP 2025 Active   traZODone (Desyrel) 50 mg tablet [id number] No Take 1 tablet (50 mg) by mouth as needed at bedtime for sleep. Shyla Serna, APRN-CNP, DNP Past Week Active   valACYclovir (Valtrex) 1 gram tablet [id number] No Take 2 tablets (2,000 mg) by mouth 2 times a day.   Patient taking differently: Take 2 tablets (2,000 mg) by mouth 2 times a day as  needed.    Shyla Serna, APRN-CNP, DNP More than a month Active   verapamil ER (Veralan PM) 240 mg 24 hr capsule [id number] No Take 1 capsule (240 mg) by mouth once daily at bedtime. Do not crush or chew. Mario Herring MD 5/28/2025 Active                     ALLERGIES:  Allergies[5]    REVIEW OF SYSTEMS:  GENERAL: Negative for malaise, significant weight loss and fever  HEAD: Negative for headache, swelling.  NECK: Negative for lumps, goiter, pain and significant neck swelling  RESPIRATORY: Negative for cough, wheezing or shortness of breath.  CARDIOVASCULAR: Negative for chest pain, leg swelling or palpitations.  GI: Negative for abdominal discomfort, blood in stools or black stools or change in bowel habits  : No history of dysuria, frequency or incontinence  MUSCULOSKELETAL: Negative for joint pain or swelling, back pain or muscle pain.  SKIN: Negative for lesions, rash, and itching.  PSYCH: Negative for sleep disturbance, mood disorder and recent psychosocial stressors.  ENDOCRINE: Negative for cold or heat intolerance, polyuria, polydipsia and goiter.    Objective   PHYSICAL EXAM:  Visit Vitals  OB Status Menopausal   Smoking Status Former     General appearance: obese, NAD  Neuro: AOx3  Head: EOMI; no swelling or lesions of scalp or face  ENT:  no lumps or lymphadenopathy, thyroid normal to palpation; oropharynx clear, no swelling or erythema  Skin: warm, no erythema or rashes  Lungs: clear to percussion and auscultation  Heart: regular rhythm and S1, S2 normal  Abdomen: soft, non-tender, no masses, no organomegaly  Extremities: Normal exam of the extremities. No swelling or pain.  Psych: no hurried speech, no flight of ideas, normal affect    IMPRESSION:  Jacqueline Flores is a 50 y.o. female with a bmi of There is no height or weight on file to calculate BMI. with the following diagnoses and co-morbidities: ***     We discussed the ***  at [facility] and all questions were answered. risks and benefits were discussed   The patient understands the risks and benefits of the procedure and how the procedure is performed. The patient understands the risks include but are not limited to bleeding, infection, DVT, PE, pneumonia, myocardial infarction, leak along the staple lines, and weight regain. We discussed lifestyle changes necessary to be successful.        ***    This patient does meet the criteria for a surgical weight loss procedure according to NIH guidelines.  The risks of sleeve gastrectomy, Yumiko-en-Y gastric bypass, and duodenal switch surgery including bleeding, leak, wound infection, dehydration, ulcers, internal hernia, DVT/PE, prolonged nausea/vomiting, incomplete resolution of associated medical conditions, reflux, weight regain, vitamin/mineral deficiencies, and death have been explained to the patient and Jacqueline Flores has expressed understanding and acceptance of them.     The increased risk of substance and alcohol abuse following bariatric surgery was discussed with the patient, along with the negative consequences of substance/alcohol use after surgery including addiction, worsening of mental health disorders, and injury to the stomach. The risk of smoking and vaping (tobacco or any other substance) after bariatric surgery was explained to the patient. This includes risk of anastamotic ulcers, gastritis, bleeding, perforation, stricture, and PO intolerance.  The patient expressed understanding and acceptance of these risks.    ***    The benefits of the above surgeries including weight loss, improvement/resolution of associated medical and mental health conditions, improved mobility, and decreased mortality have been explained the the patient and Jacqueline Flores has expressed understanding and acceptance of them.      Assessment/Plan   PLAN:  The plan of treatment for Jacqueline Flores is to continue with the consultations and tests ordered today in hopes of qualifying for pre-operative clearance for bariatric  surgery. This includes: ***    Consult Nutrition for education   Consult Psychology  Consult Cardiology  Consult Sleep Medicine - concern for CRYSTAL  Labs completed today  {JRPEGD:69412}  Recommend at least *** lbs of weight loss prior to surgery.  Additional consults/testing: ***    PLANNED PROCEDURE: {JRPprocedure:64778}      The following are some lifestyle changes you should begin to prepare you for your *** surgery.   Eliminate soda and other carbonated beverages from your diet. Carbonation will not be well tolerated after surgery. Try Propel, Vitamin Water Zero, Sobe Lifewater, Crystal Light or water.    Increase fluid consumption to 64 oz daily. Do not drink within 30 minutes of eating as this will liquefy your food and make you hungry more quickly.    Exercise for 30-60 minutes daily. Brisk walking, bike riding and swimming are all examples of healthy exercise. If you are unable to exercise we recommend seated exercise.    Do not skip meals.    Take a multivitamin daily.    Lose weight. In preparation for your surgery it is important that you begin making healthier food choices now. Our dietitian will meet with you to help you select foods lower in calories and higher in nutrition. We would like you to lose at least ***  lbs prior to surgery.     Increase your protein intake to 60 grams per day.    Alcohol is empty calories. Please eliminate while preparing for surgery.    Plan your meals.      General Instruction: 1) Use the information we gave you today to work through your insurance requirements and medical clearances.   2) These documents need to get faxed to the program navigators so they can submit them for approval from your insurance company.   3) Obtain labs today at a  facility. We will call you with any abnormalities and corrections you need to make.   4) Continue to work with your primary care doctor and other specialist so your other health problems are well controlled prior to your surgery.   5)  Adopt the recommendations of the program dietician so you develop healthy eating patterns.   6) Work with the sleep team to get your sleep apnea treated to prevent other health problems .   7) Consider attending a support group to learn from other who have been through the process.   8) Come to the [facility] sessions.   45 minutes were spent with patient including history, physical exam, and education.          [1]   Past Medical History:  Diagnosis Date    Arthritis     Arthritis of knee 04/05/2023    Unable to take NSAIDs and cannot have surgery until loses weight. Unable to increase activity due to poor pain control. Was started on Tramadol to help until loses enough weight to get surgery. CSA and UDS [facility] done [facility] 11/21/22.   Pain improved on her Tramadol 50 mg enough to function but not lasting long enough. Increased Tramadol to tid as did not last through workday. Tking extr    Benign essential hypertension 04/05/2023    Evelin Robbins  Sep 02, 2022 7:02PM  Stable on meds, BP a little high today. She will get labs and follow up in a month.     Emily King  Mar 11, 2022 2:35PM  Stable. Continue olmesartan-HCTZ, verapamil and metoprolol. Refills provided. Ordered routine blood work for 6 months.     Emily King  Nov 12, 2021 3:51PM  Stable. Continue olmesartan-HCTZ, verapamil and met    Chronic GERD 04/05/2023    Evelin Robbins  Sep 02, 2022 7:02PM  Is on Protonix. Address at follow up next month. No current xs.     Emily King  Mar 11, 2022 2:35PM  Stable. Continue protonix.  Refill provided.     Emily King  Nov 12, 2021 3:51PM  Stable. Continue protonix.  Refill provided.     Emily King  Jun 08, 2021 4:19PM  Stable. Continue protonix.  Refill provided.    Chronic pain of right knee 04/05/2023       seeing ortho now. THey told her cannot have surgery until BMI is down.  She failed Voltaren gel 1 gm 4 times a  day and unable to take oral NSAIDs. On Tramadol 50 mg tid for pain control pending weight loss and surgery.     History of stroke 06/09/2014    Hyperlipidemia     Onset: 9/2/2022    Morbid obesity (Multi) 04/05/2023    Evelin Robbins  Nov 21, 2022 5:35PM  Will increase dose of Tramadol to help with pain walking. Increasing activity will help her lose weight to get to goal to get knee replaced. Inactivity is counterproductive for her health. She is tolerating well.    Prediabetes 04/05/2023       Increased Rybelsus to 7 mg. Did not tolerate and decreased to 3 mg again. Last A1C 6.8 but others all less than 6.5.        Primary osteoarthritis of right knee 04/05/2023    Emily King  Mar 11, 2022 2:35PM  Follows with orthopedics.  S/p steroid and gel injections this past summer. Continue meloxicam, tylenol and tramadol.     Emily King  Nov 12, 2021 4:13PM  Continues to have pain. Follows with orthopedics.  S/p steroid and gel injections this past summer. Continue meloxicam and tylenol. Provided 16 tabs of tramadol to use BID 2 days per    Vitamin D deficiency 04/05/2023    Evelin Robbins  Sep 02, 2022 7:02PM  Overdue for labs. Follow up one month.     Emily King  Mar 11, 2022 2:35PM  Will start on daily vitamin D 2000 IU daily and recheck in 6 months.     Emily King  Nov 12, 2021 3:51PM  Continue weekly vitamin D as previously prescribed with repeat vitamin D after treatment.   [2]   Past Surgical History:  Procedure Laterality Date    ADENOIDECTOMY      NOSE SURGERY N/A 2013    Deviated septum, Nasal turbinates    OTHER SURGICAL HISTORY  06/08/2021    Tonsillectomy with adenoidectomy    OTHER SURGICAL HISTORY      uvula    REVERSE TOTAL SHOULDER ARTHROPLASTY Right 05/29/2025    ARTHROPLASTY, SHOULDER, TOTAL (Right: Shoulder)    SHOULDER SURGERY Right 05/2025    Shoulder Replacement    TONSILLECTOMY     [3]   Family History  Problem Relation Name Age  "of Onset    Leukemia Mother Nicole     Cancer Mother Nicole     Hypertension Mother Nicole     Hypertension Father      Thyroid disease Maternal Grandmother Tiffany     Cerebral aneurysm Maternal Grandfather      Esophageal cancer Paternal Grandmother Shawanda     Cancer Paternal Grandmother Shawanda     Diabetes Paternal Grandmother Shawanda     Cancer Father's Sister Kristy     Cancer Father's Sister Kristy     Leukemia Other Niece     Brain cancer Other Aunt    [4]   Social History  Tobacco Use    Smoking status: Former     Current packs/day: 0.00     Types: Cigarettes     Quit date:      Years since quittin.4     Passive exposure: Never    Smokeless tobacco: Never   Vaping Use    Vaping status: Never Used   Substance Use Topics    Alcohol use: Yes     Alcohol/week: 2.0 standard drinks of alcohol     Types: 2 Shots of liquor per week     Comment: weekends    Drug use: Never   [5]   Allergies  Allergen Reactions    Adhesive Tape-Silicones Hives    Pollens Extract Other     Seasonal- congestion, sneezing    Seroquel [Quetiapine] Diarrhea     \"Godinez\"     "

## 2025-06-30 NOTE — PROGRESS NOTES
PRIMARY CARE PHYSICIAN: Shyla Serna, MARCOS-NENO, DNP    ORTHOPAEDIC POSTOPERATIVE VISIT    ASSESSMENT & PLAN    Impression: 50 y.o. female 2 weeks and 5 days postop s/p right total shoulder arthroplasty, overall doing well.    Plan:   At this time, she will remain in her sling as per protocol.  She will continue physical therapy as per protocol.  All questions were answered.    I reviewed the intraoperative findings with the patient and answered all their questions. I reviewed their postoperative timeline and plan with them. They understand the postoperative precautions and the treatment plan going forward.     Follow-Up: Patient will follow-up in 4 to 6 weeks with repeat x-ray.    At the end of the visit, all questions were answered in full. The patient is in agreement with the plan and recommendations. They will call the office with any questions/concerns.      Note dictated with ProMed software. Completed without full typed error editing and sent to avoid delay.      SUBJECTIVE  CHIEF COMPLAINT:   Chief Complaint   Patient presents with    Right Shoulder - Follow-up     TSA 5/29/25-P-4/10 more incision based and some pain postrior        HPI: Jacqueline Flores is a 50 y.o. patient. Jacqueline Flores is now postop status post right shoulder arthroplasty.  Overall, the patient is recovering well.  Her pain is controlled and she is no longer narcotic medication.  No reported issues with her surgical incision.  No reports of this numbness or tingling.  Significant physical therapy as per protocol.  She is wearing her sling as per protocol.    REVIEW OF SYSTEMS  Constitutional: See HPI for pain assessment, No significant weight loss, recent trauma  Cardiovascular: No chest pain, shortness of breath  Respiratory: No difficulty breathing, cough  Gastrointestinal: No nausea, vomiting, diarrhea, constipation  Musculoskeletal: Noted in HPI, positive for pain, restricted motion, stiffness  Integumentary: No  rashes, easy bruising, redness   Neurological: no numbness or tingling in extremities, no gait disturbances   Psychiatric: No mood changes, memory changes, social issues  Heme/Lymph: no excessive swelling, easy bruising, excessive bleeding  ENT: No hearing changes  Eyes: No vision changes    CURRENT MEDICATIONS:   Current Medications[1]     OBJECTIVE    PHYSICAL EXAM      5/29/2025    10:30 AM 5/29/2025    10:45 AM 5/29/2025    11:00 AM 5/29/2025    11:15 AM 5/29/2025    11:30 AM 5/29/2025    11:50 AM 5/29/2025    12:50 PM   Vitals   Systolic 124 123 108 117 113 115 117   Diastolic 65 61 63 69 67 59 71   BP Location Left arm Left arm Left arm Left arm Left arm     Heart Rate 85 82 82 79 75 73 86   Temp 36 °C (96.8 °F)    36 °C (96.8 °F) 36.6 °C (97.9 °F) 36.6 °C (97.9 °F)   Resp 18 17 19 22 15 17 18      There is no height or weight on file to calculate BMI.    General: Well-appearing, no acute distress    Skin intact bilateral upper and lower extremities  No erythema  No warmth    Detailed examination of the right shoulder demonstrates:  Surgical incision(s) healing well.  No erythema or warmth  No drainage  No ecchymosis  Resolving swelling, minimal  Biceps contour normal  Shoulder range of motion deferred  Upper extremity motor grossly intact  C5-T1 sensation intact bilaterally  2+ radial pulses bilaterally  Warm and well-perfused, brisk capillary refill    IMAGING:   Imaging the right shoulder reveals well-placed total shoulder arthroplasty without evidence of fracture or dislocation.    Shahab Jackman MD, MPH  Attending Surgeon    Sports Medicine Orthopaedic Surgery  [facility]  [facility] [employer]        [1]   Current Outpatient Medications   Medication Sig Dispense Refill    aspirin 81 mg chewable tablet Chew 1 tablet (81 mg) once daily.      chlorhexidine (Peridex) 0.12 % solution Swish and spit 15 ml for  2 doses, 15mL the night before surgery and 15 ml morning of surgery - swish for 30 seconds -DO NOT SWALLOW, SPIT  mL 0    cholecalciferol (Vitamin D-3) 50 mcg (2,000 unit) capsule Take 1 capsule (50 mcg) by mouth early in the morning.. With food      cyanocobalamin (Vitamin B-12) 1,000 mcg tablet Take 2 tablets (2,000 mcg) by mouth once daily.      LYSINE ORAL Take 1 tablet by mouth once daily in the morning.      meloxicam (Mobic) 15 mg tablet Take 1 tablet (15 mg) by mouth once daily. 90 tablet 3    metoprolol tartrate (Lopressor) 100 mg tablet Take 1 tablet (100 mg) by mouth every 12 hours. 180 tablet 1    olmesartan-hydrochlorothiazide (Benicar HCT) 20-12.5 mg tablet Take 1 tablet by mouth once daily. 90 tablet 3    pantoprazole (ProtoNix) 40 mg EC tablet Take 1 tablet (40 mg) by mouth once daily. 90 tablet 1    QUEtiapine (SEROquel) 25 mg tablet Take 1 tablet (25 mg) by mouth once daily at bedtime. (Patient not taking: Reported on 5/15/2025) 90 tablet 1    semaglutide (Rybelsus) 14 mg tablet tablet Take 1 tablet (14 mg) by mouth once daily. 90 tablet 3    traMADol (Ultram) 50 mg tablet Take 1 tablet (50 mg) by mouth 3 times a day. 270 tablet 0    traZODone (Desyrel) 50 mg tablet Take 1 tablet (50 mg) by mouth as needed at bedtime for sleep. 90 tablet 1    valACYclovir (Valtrex) 1 gram tablet Take 2 tablets (2,000 mg) by mouth 2 times a day. (Patient taking differently: Take 2 tablets (2,000 mg) by mouth 2 times a day as needed.) 4 tablet 5    verapamil ER (Veralan PM) 240 mg 24 hr capsule Take 1 capsule (240 mg) by mouth once daily at bedtime. Do not crush or chew. 90 capsule 3     No current facility-administered medications for this visit.

## 2025-07-02 ENCOUNTER — APPOINTMENT (OUTPATIENT)
Dept: SURGERY | Facility: CLINIC | Age: 50
End: 2025-07-02
Payer: COMMERCIAL

## 2025-07-08 ENCOUNTER — APPOINTMENT (OUTPATIENT)
Dept: ORTHOPEDIC SURGERY | Facility: CLINIC | Age: 50
End: 2025-07-08
Payer: COMMERCIAL

## 2025-07-08 DIAGNOSIS — Z96.611 STATUS POST TOTAL SHOULDER ARTHROPLASTY, RIGHT: Primary | ICD-10-CM

## 2025-07-08 NOTE — LETTER
July 8, 2025     Patient: Jacqueline Flores   YOB: 1975   Date of Visit: 7/8/2025       To Whom It May Concern:    It is my medical opinion that Jacqueline Flores should remain out of work until Monday Aug 25, 2025, she has a follow up appointment on Aug 19, 2025..    If you have any questions or concerns, please don't hesitate to call.         Sincerely,        Tip Jackman MD MPH    CC: No Recipients

## 2025-07-10 NOTE — PROGRESS NOTES
PRIMARY CARE PHYSICIAN: Shyla Serna, APRN-NENO, DNP    ORTHOPAEDIC POSTOPERATIVE VISIT    ASSESSMENT & PLAN    Impression: 50 y.o. female 5 weeks and 5 days postop s/p right total shoulder arthroplasty, overall doing well.    Plan:   At this time, she will begin weaning out of her sling.  In addition, we will continue working on range of motion.  Will begin some light strengthening.  I would like to hold her out of work for the time being as she needs to focus on her recovery, regaining strength, and motion.    I reviewed the intraoperative findings with the patient and answered all their questions. I reviewed their postoperative timeline and plan with them. They understand the postoperative precautions and the treatment plan going forward.     Follow-Up: Patient will follow-up 6 weeks for repeat evaluation.    At the end of the visit, all questions were answered in full. The patient is in agreement with the plan and recommendations. They will call the office with any questions/concerns.      Note dictated with Xetal software. Completed without full typed error editing and sent to avoid delay.      SUBJECTIVE  CHIEF COMPLAINT:   Chief Complaint   Patient presents with    Right Shoulder - Follow-up, Post-op     Sx R TSA 5-29-25. Pain around incision, slight pinkish, surrounding skin intact, no drainage, no s/s of infection. Dull ache. Doing PT 1x a week, also at home everyday.    Results     R shoulder x-ray today        HPI: Jacqueline Flores is a 50 y.o. patient. Jacqueline Flores is now postop status post right anatomic total shoulder arthroplasty.  Overall, the patient is improving.  Her pain is controlled and she is no longer on narcotic medication.  No reported issues with surgical incision.  She has remained in her sling.  She works with physical therapy as per instruction.  She feels she is improving in regards to range of motion.  No new injuries or falls.  No acute complaints  today.    REVIEW OF SYSTEMS  Constitutional: See HPI for pain assessment, No significant weight loss, recent trauma  Cardiovascular: No chest pain, shortness of breath  Respiratory: No difficulty breathing, cough  Gastrointestinal: No nausea, vomiting, diarrhea, constipation  Musculoskeletal: Noted in HPI, positive for pain, restricted motion, stiffness  Integumentary: No rashes, easy bruising, redness   Neurological: no numbness or tingling in extremities, no gait disturbances   Psychiatric: No mood changes, memory changes, social issues  Heme/Lymph: no excessive swelling, easy bruising, excessive bleeding  ENT: No hearing changes  Eyes: No vision changes    CURRENT MEDICATIONS:   Current Medications[1]     OBJECTIVE    PHYSICAL EXAM      5/29/2025    10:30 AM 5/29/2025    10:45 AM 5/29/2025    11:00 AM 5/29/2025    11:15 AM 5/29/2025    11:30 AM 5/29/2025    11:50 AM 5/29/2025    12:50 PM   Vitals   Systolic 124 123 108 117 113 115 117   Diastolic 65 61 63 69 67 59 71   BP Location Left arm Left arm Left arm Left arm Left arm     Heart Rate 85 82 82 79 75 73 86   Temp 36 °C (96.8 °F)    36 °C (96.8 °F) 36.6 °C (97.9 °F) 36.6 °C (97.9 °F)   Resp 18 17 19 22 15 17 18      There is no height or weight on file to calculate BMI.    General: Well-appearing, no acute distress    Skin intact bilateral upper and lower extremities  No erythema  No warmth    Detailed examination of the right shoulder demonstrates:  Surgical incision(s) healing well.  No erythema or warmth  No drainage  No ecchymosis  Resolving swelling, minimal  Biceps contour normal  Shoulder range of motion: Passive range of motion full with 160 degrees of forward flexion, 120 degrees of abduction, 20 degrees of external rotation.  Upper extremity motor grossly intact  C5-T1 sensation intact bilaterally  2+ radial pulses bilaterally  Warm and well-perfused, brisk capillary refill    IMAGING:   Imaging of the right shoulder reveals well-placed total  shoulder arthroplasty without evidence of interval fracture or hardware displacement.    Shahab Jackman MD, MPH  Attending Surgeon    Sports Medicine Orthopaedic Surgery  [facility] [place]  [facility] [employer]        [1]   Current Outpatient Medications   Medication Sig Dispense Refill    aspirin 81 mg chewable tablet Chew 1 tablet (81 mg) once daily.      chlorhexidine (Peridex) 0.12 % solution Swish and spit 15 ml for 2 doses, 15mL the night before surgery and 15 ml morning of surgery - swish for 30 seconds -DO NOT SWALLOW, SPIT  mL 0    cholecalciferol (Vitamin D-3) 50 mcg (2,000 unit) capsule Take 1 capsule (50 mcg) by mouth early in the morning.. With food      cyanocobalamin (Vitamin B-12) 1,000 mcg tablet Take 2 tablets (2,000 mcg) by mouth once daily.      LYSINE ORAL Take 1 tablet by mouth once daily in the morning.      meloxicam (Mobic) 15 mg tablet Take 1 tablet (15 mg) by mouth once daily. 90 tablet 3    metoprolol tartrate (Lopressor) 100 mg tablet Take 1 tablet (100 mg) by mouth every 12 hours. 180 tablet 1    olmesartan-hydrochlorothiazide (Benicar HCT) 20-12.5 mg tablet Take 1 tablet by mouth once daily. 90 tablet 3    pantoprazole (ProtoNix) 40 mg EC tablet Take 1 tablet (40 mg) by mouth once daily. 90 tablet 1    QUEtiapine (SEROquel) 25 mg tablet Take 1 tablet (25 mg) by mouth once daily at bedtime. (Patient not taking: Reported on 5/15/2025) 90 tablet 1    semaglutide (Rybelsus) 14 mg tablet tablet Take 1 tablet (14 mg) by mouth once daily. 90 tablet 3    traMADol (Ultram) 50 mg tablet Take 1 tablet (50 mg) by mouth 3 times a day. 270 tablet 0    traZODone (Desyrel) 50 mg tablet Take 1 tablet (50 mg) by mouth as needed at bedtime for sleep. 90 tablet 1    valACYclovir (Valtrex) 1 gram tablet Take 2 tablets (2,000 mg) by mouth 2 times a day. (Patient taking differently: Take 2 tablets (2,000  mg) by mouth 2 times a day as needed.) 4 tablet 5    verapamil ER (Veralan PM) 240 mg 24 hr capsule Take 1 capsule (240 mg) by mouth once daily at bedtime. Do not crush or chew. 90 capsule 3     No current facility-administered medications for this visit.

## 2025-07-16 ENCOUNTER — TELEPHONE (OUTPATIENT)
Dept: SURGERY | Facility: CLINIC | Age: 50
End: 2025-07-16
Payer: COMMERCIAL

## 2025-07-16 NOTE — TELEPHONE ENCOUNTER
Patient had [place] appts sched 7/21/25 with Dr. Posada. All appts were cancelled via my chart. I called patient to see if she wants to reschedule. LVM to call me

## 2025-07-17 ENCOUNTER — TELEPHONE (OUTPATIENT)
Dept: SURGERY | Facility: CLINIC | Age: 50
End: 2025-07-17
Payer: COMMERCIAL

## 2025-07-21 ENCOUNTER — APPOINTMENT (OUTPATIENT)
Dept: SURGERY | Facility: CLINIC | Age: 50
End: 2025-07-21
Payer: COMMERCIAL

## 2025-07-21 ENCOUNTER — APPOINTMENT (OUTPATIENT)
Dept: BEHAVIORAL HEALTH | Facility: HOSPITAL | Age: 50
End: 2025-07-21
Payer: COMMERCIAL

## 2025-08-05 ENCOUNTER — APPOINTMENT (OUTPATIENT)
Dept: PRIMARY CARE | Facility: CLINIC | Age: 50
End: 2025-08-05
Payer: COMMERCIAL

## 2025-08-12 ENCOUNTER — APPOINTMENT (OUTPATIENT)
Dept: PRIMARY CARE | Facility: CLINIC | Age: 50
End: 2025-08-12
Payer: COMMERCIAL

## 2025-08-12 VITALS
HEART RATE: 69 BPM | BODY MASS INDEX: 47.28 KG/M2 | WEIGHT: 283.8 LBS | DIASTOLIC BLOOD PRESSURE: 81 MMHG | HEIGHT: 65 IN | OXYGEN SATURATION: 96 % | SYSTOLIC BLOOD PRESSURE: 112 MMHG | TEMPERATURE: 97 F

## 2025-08-12 DIAGNOSIS — K21.9 CHRONIC GERD: ICD-10-CM

## 2025-08-12 DIAGNOSIS — G89.29 CHRONIC PAIN OF RIGHT KNEE: ICD-10-CM

## 2025-08-12 DIAGNOSIS — F51.01 PRIMARY INSOMNIA: Primary | ICD-10-CM

## 2025-08-12 DIAGNOSIS — K13.0 MUCOCELE OF LIP: ICD-10-CM

## 2025-08-12 DIAGNOSIS — I10 ACCELERATED ESSENTIAL HYPERTENSION: ICD-10-CM

## 2025-08-12 DIAGNOSIS — M25.561 CHRONIC PAIN OF RIGHT KNEE: ICD-10-CM

## 2025-08-12 PROCEDURE — 3074F SYST BP LT 130 MM HG: CPT | Performed by: NURSE PRACTITIONER

## 2025-08-12 PROCEDURE — 3079F DIAST BP 80-89 MM HG: CPT | Performed by: NURSE PRACTITIONER

## 2025-08-12 PROCEDURE — 99213 OFFICE O/P EST LOW 20 MIN: CPT | Performed by: NURSE PRACTITIONER

## 2025-08-12 PROCEDURE — 3008F BODY MASS INDEX DOCD: CPT | Performed by: NURSE PRACTITIONER

## 2025-08-12 RX ORDER — OLMESARTAN MEDOXOMIL AND HYDROCHLOROTHIAZIDE 20/12.5 20; 12.5 MG/1; MG/1
1 TABLET ORAL DAILY
Qty: 90 TABLET | Refills: 3 | Status: SHIPPED | OUTPATIENT
Start: 2025-08-12 | End: 2026-08-12

## 2025-08-12 RX ORDER — TRAMADOL HYDROCHLORIDE 50 MG/1
50 TABLET, FILM COATED ORAL 3 TIMES DAILY
Qty: 270 TABLET | Refills: 0 | Status: SHIPPED | OUTPATIENT
Start: 2025-08-12

## 2025-08-12 RX ORDER — PANTOPRAZOLE SODIUM 40 MG/1
40 TABLET, DELAYED RELEASE ORAL DAILY
Qty: 90 TABLET | Refills: 1 | Status: SHIPPED | OUTPATIENT
Start: 2025-08-12 | End: 2026-02-08

## 2025-08-12 ASSESSMENT — ENCOUNTER SYMPTOMS
CONFUSION: 0
ABDOMINAL PAIN: 0
ACTIVITY CHANGE: 0
PALPITATIONS: 0
FATIGUE: 0
CHILLS: 0
HEADACHES: 0
DIZZINESS: 0
CONSTITUTIONAL NEGATIVE: 1
FEVER: 0
WEAKNESS: 0
NAUSEA: 0
SLEEP DISTURBANCE: 1
COUGH: 0
VOMITING: 0
RESPIRATORY NEGATIVE: 1
NERVOUS/ANXIOUS: 0
HALLUCINATIONS: 0
APNEA: 0
SHORTNESS OF BREATH: 0

## 2025-08-19 ENCOUNTER — ANCILLARY PROCEDURE (OUTPATIENT)
Dept: ORTHOPEDIC SURGERY | Facility: CLINIC | Age: 50
End: 2025-08-19
Payer: COMMERCIAL

## 2025-08-19 ENCOUNTER — APPOINTMENT (OUTPATIENT)
Dept: ORTHOPEDIC SURGERY | Facility: CLINIC | Age: 50
End: 2025-08-19
Payer: COMMERCIAL

## 2025-08-19 DIAGNOSIS — Z96.611 STATUS POST TOTAL SHOULDER ARTHROPLASTY, RIGHT: ICD-10-CM

## 2025-08-19 PROCEDURE — 73030 X-RAY EXAM OF SHOULDER: CPT | Mod: RIGHT SIDE | Performed by: STUDENT IN AN ORGANIZED HEALTH CARE EDUCATION/TRAINING PROGRAM

## 2025-08-19 ASSESSMENT — PAIN - FUNCTIONAL ASSESSMENT: PAIN_FUNCTIONAL_ASSESSMENT: NO/DENIES PAIN

## 2025-08-21 ENCOUNTER — APPOINTMENT (OUTPATIENT)
Dept: OTOLARYNGOLOGY | Facility: CLINIC | Age: 50
End: 2025-08-21
Payer: COMMERCIAL

## 2025-09-29 ENCOUNTER — APPOINTMENT (OUTPATIENT)
Dept: SURGERY | Facility: CLINIC | Age: 50
End: 2025-09-29
Payer: COMMERCIAL

## 2025-09-29 ENCOUNTER — APPOINTMENT (OUTPATIENT)
Dept: BEHAVIORAL HEALTH | Facility: HOSPITAL | Age: 50
End: 2025-09-29
Payer: COMMERCIAL

## 2026-04-30 ENCOUNTER — APPOINTMENT (OUTPATIENT)
Dept: PRIMARY CARE | Facility: CLINIC | Age: 51
End: 2026-04-30
Payer: COMMERCIAL

## 2026-08-13 ENCOUNTER — APPOINTMENT (OUTPATIENT)
Dept: PRIMARY CARE | Facility: CLINIC | Age: 51
End: 2026-08-13
Payer: COMMERCIAL

## (undated) DEVICE — GLOVE, SURGICAL, PROTEXIS PI , 8.0, PF, LF

## (undated) DEVICE — SUTURE, MONOCRYL, 3-0, 27 IN, PS-2, UNDYED

## (undated) DEVICE — DRESSING, MEPILEX BORDER, POST-OP AG, 4 X 10 IN

## (undated) DEVICE — WOUND SYSTEM, DEBRIDEMENT & CLEANING, O.R DUOPAK

## (undated) DEVICE — SUTURE, ETHIBOND, P2, V-37, 30 IN, GREEN

## (undated) DEVICE — CLOSURE SYSTEM, DERMABOND, PRINEO, 22CM, STERILE

## (undated) DEVICE — COVER, TABLE, 54X90

## (undated) DEVICE — HIGH FLOW TIP FOR INTERPULSE HANDPIECE SET

## (undated) DEVICE — MIXER, CEMENT, MIXEVAC III HIGH VACUUM KIT, STERILE

## (undated) DEVICE — HEAD POSITIONER, UNIVERSAL, DISP

## (undated) DEVICE — DRAPE, INCISE, ANTIMICROBIAL, IOBAN 2, LARGE, 17 X 23 IN, DISPOSABLE, STERILE

## (undated) DEVICE — GOWN, SMARTSLEEVE, XXX-LG, X-LONG

## (undated) DEVICE — DRAPE, SHEET, U, STERI DRAPE, 47 X 51 IN, DISPOSABLE, STERILE

## (undated) DEVICE — SOLUTION, IRRIGATION, SODIUM CHLORIDE 0.9%, 1000 ML, POUR BOTTLE

## (undated) DEVICE — BANDAGE, COFLEX, 4 X 5 YDS, FOAM TAN, STERILE, LF

## (undated) DEVICE — HOOD, SURGICAL, FLYTE SURGICOOL

## (undated) DEVICE — DRAPE, SHEET, THREE QUARTER, FAN FOLD, 57 X 77 IN

## (undated) DEVICE — Device

## (undated) DEVICE — GLOVE, SURGICAL, PROTEXIS PI BLUE W/NEUTHERA, 8.5, PF, LF

## (undated) DEVICE — KIT, BEACH CHAIR TRIMANO

## (undated) DEVICE — INTERPULSE HANDPIECE SET W/ 10FT SUCTION TUBING

## (undated) DEVICE — TOWEL PACK, STERILE, 4/PACK, BLUE

## (undated) DEVICE — TUBING, SUCTION, CONNECTING, 9/32 X 10FT, LF

## (undated) DEVICE — APPLICATOR, CHLORAPREP, W/ORANGE TINT, 26ML

## (undated) DEVICE — BLADE, SAW, SAGITTAL, 21 X 84.5 X 0.89 MM, STAINLESS STEEL, STERILE

## (undated) DEVICE — TIP, SUCTION, YANKAUER, FLEXIBLE

## (undated) DEVICE — SOLUTION, IRRIGATION, STERILE WATER, 1000 ML, POUR BOTTLE